# Patient Record
Sex: MALE | Race: WHITE | NOT HISPANIC OR LATINO
[De-identification: names, ages, dates, MRNs, and addresses within clinical notes are randomized per-mention and may not be internally consistent; named-entity substitution may affect disease eponyms.]

---

## 2018-05-16 ENCOUNTER — APPOINTMENT (OUTPATIENT)
Dept: SURGICAL ONCOLOGY | Facility: CLINIC | Age: 68
End: 2018-05-16
Payer: MEDICARE

## 2018-05-16 VITALS
BODY MASS INDEX: 30.09 KG/M2 | OXYGEN SATURATION: 98 % | WEIGHT: 242 LBS | HEIGHT: 75 IN | HEART RATE: 78 BPM | DIASTOLIC BLOOD PRESSURE: 136 MMHG | SYSTOLIC BLOOD PRESSURE: 181 MMHG

## 2018-05-16 DIAGNOSIS — Z78.9 OTHER SPECIFIED HEALTH STATUS: ICD-10-CM

## 2018-05-16 DIAGNOSIS — K57.32 DIVERTICULITIS OF LARGE INTESTINE W/OUT PERFORATION OR ABSCESS W/OUT BLEEDING: ICD-10-CM

## 2018-05-16 DIAGNOSIS — C44.90 UNSPECIFIED MALIGNANT NEOPLASM OF SKIN, UNSPECIFIED: ICD-10-CM

## 2018-05-16 DIAGNOSIS — Z84.0 FAMILY HISTORY OF DISEASES OF THE SKIN AND SUBCUTANEOUS TISSUE: ICD-10-CM

## 2018-05-16 DIAGNOSIS — Z82.49 FAMILY HISTORY OF ISCHEMIC HEART DISEASE AND OTHER DISEASES OF THE CIRCULATORY SYSTEM: ICD-10-CM

## 2018-05-16 DIAGNOSIS — N40.0 BENIGN PROSTATIC HYPERPLASIA WITHOUT LOWER URINARY TRACT SYMPMS: ICD-10-CM

## 2018-05-16 DIAGNOSIS — M25.50 PAIN IN UNSPECIFIED JOINT: ICD-10-CM

## 2018-05-16 PROCEDURE — 99203 OFFICE O/P NEW LOW 30 MIN: CPT

## 2018-06-01 ENCOUNTER — APPOINTMENT (OUTPATIENT)
Dept: PLASTIC SURGERY | Facility: CLINIC | Age: 68
End: 2018-06-01
Payer: MEDICARE

## 2018-06-01 ENCOUNTER — RESULT REVIEW (OUTPATIENT)
Age: 68
End: 2018-06-01

## 2018-06-01 VITALS
HEIGHT: 75 IN | WEIGHT: 235 LBS | BODY MASS INDEX: 29.22 KG/M2 | DIASTOLIC BLOOD PRESSURE: 99 MMHG | HEART RATE: 89 BPM | SYSTOLIC BLOOD PRESSURE: 147 MMHG

## 2018-06-01 DIAGNOSIS — I10 ESSENTIAL (PRIMARY) HYPERTENSION: ICD-10-CM

## 2018-06-01 PROCEDURE — 99204 OFFICE O/P NEW MOD 45 MIN: CPT

## 2018-06-04 PROBLEM — I10 HIGH BLOOD PRESSURE: Status: ACTIVE | Noted: 2018-06-01

## 2018-07-25 ENCOUNTER — FORM ENCOUNTER (OUTPATIENT)
Age: 68
End: 2018-07-25

## 2018-07-26 ENCOUNTER — OUTPATIENT (OUTPATIENT)
Dept: OUTPATIENT SERVICES | Facility: HOSPITAL | Age: 68
LOS: 1 days | End: 2018-07-26
Payer: MEDICARE

## 2018-07-26 VITALS
OXYGEN SATURATION: 97 % | DIASTOLIC BLOOD PRESSURE: 84 MMHG | TEMPERATURE: 97 F | HEIGHT: 75 IN | WEIGHT: 244.93 LBS | SYSTOLIC BLOOD PRESSURE: 119 MMHG | HEART RATE: 77 BPM | RESPIRATION RATE: 16 BRPM

## 2018-07-26 DIAGNOSIS — D03.20: ICD-10-CM

## 2018-07-26 DIAGNOSIS — D03.9 MELANOMA IN SITU, UNSPECIFIED: ICD-10-CM

## 2018-07-26 DIAGNOSIS — Z01.818 ENCOUNTER FOR OTHER PREPROCEDURAL EXAMINATION: ICD-10-CM

## 2018-07-26 DIAGNOSIS — I10 ESSENTIAL (PRIMARY) HYPERTENSION: ICD-10-CM

## 2018-07-26 LAB
ANION GAP SERPL CALC-SCNC: 11 MMOL/L — SIGNIFICANT CHANGE UP (ref 5–17)
BUN SERPL-MCNC: 18 MG/DL — SIGNIFICANT CHANGE UP (ref 7–23)
CALCIUM SERPL-MCNC: 9.8 MG/DL — SIGNIFICANT CHANGE UP (ref 8.4–10.5)
CHLORIDE SERPL-SCNC: 103 MMOL/L — SIGNIFICANT CHANGE UP (ref 96–108)
CO2 SERPL-SCNC: 26 MMOL/L — SIGNIFICANT CHANGE UP (ref 22–31)
CREAT SERPL-MCNC: 1.29 MG/DL — SIGNIFICANT CHANGE UP (ref 0.5–1.3)
GLUCOSE SERPL-MCNC: 109 MG/DL — HIGH (ref 70–99)
HCT VFR BLD CALC: 50.2 % — HIGH (ref 39–50)
HGB BLD-MCNC: 17 G/DL — SIGNIFICANT CHANGE UP (ref 13–17)
LDH SERPL L TO P-CCNC: 196 U/L — SIGNIFICANT CHANGE UP (ref 50–242)
MCHC RBC-ENTMCNC: 31.4 PG — SIGNIFICANT CHANGE UP (ref 27–34)
MCHC RBC-ENTMCNC: 33.9 GM/DL — SIGNIFICANT CHANGE UP (ref 32–36)
MCV RBC AUTO: 92.6 FL — SIGNIFICANT CHANGE UP (ref 80–100)
PLATELET # BLD AUTO: 195 K/UL — SIGNIFICANT CHANGE UP (ref 150–400)
POTASSIUM SERPL-MCNC: 4.8 MMOL/L — SIGNIFICANT CHANGE UP (ref 3.5–5.3)
POTASSIUM SERPL-SCNC: 4.8 MMOL/L — SIGNIFICANT CHANGE UP (ref 3.5–5.3)
RBC # BLD: 5.42 M/UL — SIGNIFICANT CHANGE UP (ref 4.2–5.8)
RBC # FLD: 13.8 % — SIGNIFICANT CHANGE UP (ref 10.3–14.5)
SODIUM SERPL-SCNC: 140 MMOL/L — SIGNIFICANT CHANGE UP (ref 135–145)
WBC # BLD: 5.54 K/UL — SIGNIFICANT CHANGE UP (ref 3.8–10.5)
WBC # FLD AUTO: 5.54 K/UL — SIGNIFICANT CHANGE UP (ref 3.8–10.5)

## 2018-07-26 PROCEDURE — 80048 BASIC METABOLIC PNL TOTAL CA: CPT

## 2018-07-26 PROCEDURE — G0463: CPT

## 2018-07-26 PROCEDURE — 71046 X-RAY EXAM CHEST 2 VIEWS: CPT

## 2018-07-26 PROCEDURE — 83615 LACTATE (LD) (LDH) ENZYME: CPT

## 2018-07-26 PROCEDURE — 85027 COMPLETE CBC AUTOMATED: CPT

## 2018-07-26 PROCEDURE — 71046 X-RAY EXAM CHEST 2 VIEWS: CPT | Mod: 26

## 2018-07-26 RX ORDER — SODIUM CHLORIDE 9 MG/ML
3 INJECTION INTRAMUSCULAR; INTRAVENOUS; SUBCUTANEOUS EVERY 8 HOURS
Qty: 0 | Refills: 0 | Status: DISCONTINUED | OUTPATIENT
Start: 2018-08-02 | End: 2018-08-17

## 2018-07-26 RX ORDER — LIDOCAINE HCL 20 MG/ML
0.2 VIAL (ML) INJECTION ONCE
Qty: 0 | Refills: 0 | Status: DISCONTINUED | OUTPATIENT
Start: 2018-08-02 | End: 2018-08-17

## 2018-07-26 NOTE — H&P PST ADULT - ATTENDING COMMENTS
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 Valentino.    Diagnosis and planned treatment were reviewed with him in my office and again the morning of operation.    All questions answered.    Consent on chart

## 2018-07-26 NOTE — H&P PST ADULT - NSANTHOSAYNRD_GEN_A_CORE
No. RAJANI screening performed.  STOP BANG Legend: 0-2 = LOW Risk; 3-4 = INTERMEDIATE Risk; 5-8 = HIGH Risk

## 2018-07-26 NOTE — H&P PST ADULT - HISTORY OF PRESENT ILLNESS
68 y/o male with hx of HTN, DJD , came in for PSt today for  wide excision meleft ear lobe. Patient went to dermatologist had biopsy on affected  site which was positive ,  referred to Dr Gallardo , evaluated and indicated surgery for treatment.

## 2018-07-26 NOTE — H&P PST ADULT - PROBLEM SELECTOR PLAN 1
Wide excision melanoma left earlobe, reconstruction of left earlobe wound, possible local flap ,possible skin graft   PSt instruction given , CBC/CMP/ CXR done pending result   To continue asa until DOS   has appointment with PMD in [place] for preop medical evaluation Wide excision melanoma left earlobe, reconstruction of left earlobe wound, possible local flap ,possible skin graft   PSt instruction given , CBC/bMP/ldh/ CXR done pending result   To continue asa until DOS   has appointment with PMD in [place] for preop medical evaluation

## 2018-07-26 NOTE — H&P PST ADULT - PSH
S/P foot surgery  right 2007  Ulnar nerve impingement  left 1996  S/P knee surgery  arthroscopy bilat knees - total 3 times - 2000, 2002, 2010  S/P carpal tunnel release  left 1996  S/P laminectomy  1995

## 2018-08-01 ENCOUNTER — TRANSCRIPTION ENCOUNTER (OUTPATIENT)
Age: 68
End: 2018-08-01

## 2018-08-01 RX ORDER — OXYCODONE HYDROCHLORIDE 5 MG/1
5 TABLET ORAL ONCE
Qty: 0 | Refills: 0 | Status: DISCONTINUED | OUTPATIENT
Start: 2018-08-02 | End: 2018-08-02

## 2018-08-01 RX ORDER — SODIUM CHLORIDE 9 MG/ML
1000 INJECTION, SOLUTION INTRAVENOUS
Qty: 0 | Refills: 0 | Status: DISCONTINUED | OUTPATIENT
Start: 2018-08-02 | End: 2018-08-17

## 2018-08-01 RX ORDER — ONDANSETRON 8 MG/1
4 TABLET, FILM COATED ORAL ONCE
Qty: 0 | Refills: 0 | Status: DISCONTINUED | OUTPATIENT
Start: 2018-08-02 | End: 2018-08-17

## 2018-08-01 NOTE — ASU DISCHARGE PLAN (ADULT/PEDIATRIC). - ACTIVITY LEVEL
no sports/gym/no heavy lifting/no exercise/Sleep with 1-2 pillows to elevate the head slightly to relieve swelling

## 2018-08-01 NOTE — ASU DISCHARGE PLAN (ADULT/PEDIATRIC). - ITEMS TO FOLLOWUP WITH YOUR PHYSICIAN'S
Initial followup in the next 7-10 days his of plastic surgery.  Dr. Gallardo should call with pathology report by August 14, followup with him will be based upon the conversation Please follow up with Dr. Valentino NEXT WEEK Friday 08/10/18.   Call [phone] to make an appointment.    Dr. Gallardo should call with pathology report by August 14, followup with him will be based upon the conversation

## 2018-08-01 NOTE — ASU DISCHARGE PLAN (ADULT/PEDIATRIC). - SPECIAL INSTRUCTIONS
Please keep your earlobe dry until next week you see Dr. Valentino.  Keep all the dressings intact; if the dressings happen to FALL off that is okay - since it is the earlobe it will be difficult for dressings to stick.  Sleep with a few extra pillows to keep the head slightly elevated this will help with any swelling that may occur. Please keep your earlobe dry until next week you see Dr. Valentino.  Keep all the dressings intact do not touch or get the left earlobe dressing wet. sleep on the right side to prevent damage to the left earlobe.  Sleep with a few extra pillows to keep the head slightly elevated this will help with any swelling that may occur.

## 2018-08-01 NOTE — ASU DISCHARGE PLAN (ADULT/PEDIATRIC). - NOTIFY
Swelling that continues/Persistent Nausea and Vomiting/Fever greater than 101/Excessive Diarrhea/Increased Irritability or Sluggishness/Pain not relieved by Medications/Inability to Tolerate Liquids or Foods/Numbness, color, or temperature change to extremity/Unable to Urinate/Numbness, tingling/Bleeding that does not stop

## 2018-08-01 NOTE — ASU DISCHARGE PLAN (ADULT/PEDIATRIC). - MEDICATION SUMMARY - MEDICATIONS TO TAKE
I will START or STAY ON the medications listed below when I get home from the hospital:    Percocet 5/325 oral tablet  -- 1 tab(s) by mouth every 4 to 6 hours, As Needed -for severe pain MDD:6 tabs   -- Caution federal law prohibits the transfer of this drug to any person other  than the person for whom it was prescribed.  May cause drowsiness.  Alcohol may intensify this effect.  Use care when operating dangerous machinery.  This prescription cannot be refilled.  This product contains acetaminophen.  Do not use  with any other product containing acetaminophen to prevent possible liver damage.  Using more of this medication than prescribed may cause serious breathing problems.    -- Indication: For for severe pain; this may make you constipated please take an over the counter stool softener if constipation occurs    Ecotrin Adult Low Strength 81 mg oral delayed release tablet  -- 1 tab(s) by mouth once a day  -- Indication: For home medication    atenolol 100 mg oral tablet  -- 1 tab(s) by mouth once a day  -- Indication: For home medication

## 2018-08-02 ENCOUNTER — RESULT REVIEW (OUTPATIENT)
Age: 68
End: 2018-08-02

## 2018-08-02 ENCOUNTER — APPOINTMENT (OUTPATIENT)
Dept: SURGICAL ONCOLOGY | Facility: HOSPITAL | Age: 68
End: 2018-08-02

## 2018-08-02 ENCOUNTER — OUTPATIENT (OUTPATIENT)
Dept: OUTPATIENT SERVICES | Facility: HOSPITAL | Age: 68
LOS: 1 days | End: 2018-08-02
Payer: MEDICARE

## 2018-08-02 VITALS
TEMPERATURE: 97 F | SYSTOLIC BLOOD PRESSURE: 129 MMHG | DIASTOLIC BLOOD PRESSURE: 89 MMHG | WEIGHT: 244.93 LBS | RESPIRATION RATE: 16 BRPM | HEIGHT: 75 IN | HEART RATE: 79 BPM | OXYGEN SATURATION: 97 %

## 2018-08-02 VITALS
SYSTOLIC BLOOD PRESSURE: 132 MMHG | TEMPERATURE: 97 F | OXYGEN SATURATION: 97 % | DIASTOLIC BLOOD PRESSURE: 82 MMHG | RESPIRATION RATE: 18 BRPM | HEART RATE: 82 BPM

## 2018-08-02 DIAGNOSIS — D03.20: ICD-10-CM

## 2018-08-02 PROCEDURE — 88305 TISSUE EXAM BY PATHOLOGIST: CPT | Mod: 26

## 2018-08-02 PROCEDURE — 11644 EXC F/E/E/N/L MAL+MRG 3.1-4: CPT | Mod: LT

## 2018-08-02 PROCEDURE — 88341 IMHCHEM/IMCYTCHM EA ADD ANTB: CPT | Mod: 26

## 2018-08-02 PROCEDURE — 15260 FTH/GFT FR N/E/E/L 20 SQCM/<: CPT

## 2018-08-02 PROCEDURE — 88342 IMHCHEM/IMCYTCHM 1ST ANTB: CPT

## 2018-08-02 PROCEDURE — 88342 IMHCHEM/IMCYTCHM 1ST ANTB: CPT | Mod: 26

## 2018-08-02 PROCEDURE — 88305 TISSUE EXAM BY PATHOLOGIST: CPT

## 2018-08-02 PROCEDURE — 88341 IMHCHEM/IMCYTCHM EA ADD ANTB: CPT

## 2018-08-02 PROCEDURE — 11642 EXC F/E/E/N/L MAL+MRG 1.1-2: CPT

## 2018-08-02 NOTE — BRIEF OPERATIVE NOTE - PROCEDURE
<<-----Click on this checkbox to enter Procedure Reconstruction of left earlobe  08/02/2018  FTSG from left neck to the left earlobe  Active  BKWON

## 2018-08-02 NOTE — BRIEF OPERATIVE NOTE - PROCEDURE
<<-----Click on this checkbox to enter Procedure Melanoma excision  08/02/2018  Excision of large diameter melanoma in situ from our left earlobe  Active  MBEG

## 2018-08-04 PROBLEM — M19.90 UNSPECIFIED OSTEOARTHRITIS, UNSPECIFIED SITE: Chronic | Status: ACTIVE | Noted: 2018-07-26

## 2018-08-08 LAB — SURGICAL PATHOLOGY STUDY: SIGNIFICANT CHANGE UP

## 2018-08-09 ENCOUNTER — APPOINTMENT (OUTPATIENT)
Dept: PLASTIC SURGERY | Facility: CLINIC | Age: 68
End: 2018-08-09
Payer: MEDICARE

## 2018-08-09 PROCEDURE — 99024 POSTOP FOLLOW-UP VISIT: CPT

## 2018-08-15 ENCOUNTER — APPOINTMENT (OUTPATIENT)
Dept: PLASTIC SURGERY | Facility: CLINIC | Age: 68
End: 2018-08-15
Payer: MEDICARE

## 2018-08-15 PROCEDURE — 99024 POSTOP FOLLOW-UP VISIT: CPT

## 2018-08-16 ENCOUNTER — TRANSCRIPTION ENCOUNTER (OUTPATIENT)
Age: 68
End: 2018-08-16

## 2018-09-07 ENCOUNTER — APPOINTMENT (OUTPATIENT)
Dept: PLASTIC SURGERY | Facility: CLINIC | Age: 68
End: 2018-09-07
Payer: MEDICARE

## 2018-09-07 PROCEDURE — 99024 POSTOP FOLLOW-UP VISIT: CPT

## 2018-10-23 ENCOUNTER — APPOINTMENT (OUTPATIENT)
Dept: SURGICAL ONCOLOGY | Facility: HOSPITAL | Age: 68
End: 2018-10-23

## 2019-03-05 ENCOUNTER — OUTPATIENT (OUTPATIENT)
Dept: OUTPATIENT SERVICES | Facility: HOSPITAL | Age: 69
LOS: 1 days | End: 2019-03-05
Payer: MEDICARE

## 2019-03-05 VITALS
WEIGHT: 235.01 LBS | RESPIRATION RATE: 16 BRPM | OXYGEN SATURATION: 96 % | HEIGHT: 75 IN | TEMPERATURE: 98 F | DIASTOLIC BLOOD PRESSURE: 88 MMHG | HEART RATE: 77 BPM | SYSTOLIC BLOOD PRESSURE: 152 MMHG

## 2019-03-05 DIAGNOSIS — Z85.828 PERSONAL HISTORY OF OTHER MALIGNANT NEOPLASM OF SKIN: Chronic | ICD-10-CM

## 2019-03-05 DIAGNOSIS — D22.9 MELANOCYTIC NEVI, UNSPECIFIED: ICD-10-CM

## 2019-03-05 DIAGNOSIS — Z01.84 ENCOUNTER FOR ANTIBODY RESPONSE EXAMINATION: ICD-10-CM

## 2019-03-05 LAB
ANION GAP SERPL CALC-SCNC: 13 MMOL/L — SIGNIFICANT CHANGE UP (ref 5–17)
BUN SERPL-MCNC: 17 MG/DL — SIGNIFICANT CHANGE UP (ref 7–23)
CALCIUM SERPL-MCNC: 9.7 MG/DL — SIGNIFICANT CHANGE UP (ref 8.4–10.5)
CHLORIDE SERPL-SCNC: 102 MMOL/L — SIGNIFICANT CHANGE UP (ref 96–108)
CO2 SERPL-SCNC: 22 MMOL/L — SIGNIFICANT CHANGE UP (ref 22–31)
CREAT SERPL-MCNC: 1.11 MG/DL — SIGNIFICANT CHANGE UP (ref 0.5–1.3)
GLUCOSE SERPL-MCNC: 87 MG/DL — SIGNIFICANT CHANGE UP (ref 70–99)
POTASSIUM SERPL-MCNC: 4.4 MMOL/L — SIGNIFICANT CHANGE UP (ref 3.5–5.3)
POTASSIUM SERPL-SCNC: 4.4 MMOL/L — SIGNIFICANT CHANGE UP (ref 3.5–5.3)
SODIUM SERPL-SCNC: 137 MMOL/L — SIGNIFICANT CHANGE UP (ref 135–145)

## 2019-03-05 PROCEDURE — G0463: CPT

## 2019-03-05 PROCEDURE — 80048 BASIC METABOLIC PNL TOTAL CA: CPT

## 2019-03-05 RX ORDER — SODIUM CHLORIDE 9 MG/ML
3 INJECTION INTRAMUSCULAR; INTRAVENOUS; SUBCUTANEOUS EVERY 8 HOURS
Qty: 0 | Refills: 0 | Status: DISCONTINUED | OUTPATIENT
Start: 2019-03-14 | End: 2019-03-29

## 2019-03-05 RX ORDER — LIDOCAINE HCL 20 MG/ML
0.2 VIAL (ML) INJECTION ONCE
Qty: 0 | Refills: 0 | Status: DISCONTINUED | OUTPATIENT
Start: 2019-03-14 | End: 2019-03-29

## 2019-03-05 NOTE — H&P PST ADULT - HISTORY OF PRESENT ILLNESS
66 y/o male with hx of HTN, DJD , came in for PSt today for  wide excision meleft ear lobe. Patient went to dermatologist had biopsy on affected  site which was positive ,  referred to Dr Gallardo , evaluated and indicated surgery for treatment. 69 y/o male with PMH of HTN with left ear lobe melanocytic hyperplasia s/p wide excision 7/2018 planned for wide excision atypical melanocytic hyperplasia of left ear lobe 3/14/19. 67 y/o male with PMH of HTN with left ear lobe melanocytic hyperplasia s/p wide excision 7/2018 planned for wide excision atypical melanocytic hyperplasia of left ear lobe 3/14/19.       **** Patient with "SR" added to his name, no "SR" on sunrise, spoke to registration.

## 2019-03-05 NOTE — H&P PST ADULT - PSH
S/P carpal tunnel release  left 1996  S/P foot surgery  right 2007  S/P knee surgery  arthroscopy bilat knees - total 3 times - 2000, 2002, 2010  S/P laminectomy  1995  Ulnar nerve impingement  left 1996 History of skin cancer in adulthood  left ear lobe wide excision 7/2018  S/P carpal tunnel release  left 1996  S/P foot surgery  right 2007  S/P knee surgery  arthroscopy bilat knees - total 3 times - 2000, 2002, 2010  S/P laminectomy  1995  Ulnar nerve impingement  left 1996

## 2019-03-05 NOTE — H&P PST ADULT - GASTROINTESTINAL DETAILS
normal/bowel sounds normal/no guarding/soft/no rebound tenderness/nontender nontender/no distention/no rebound tenderness/soft/bowel sounds normal/no guarding

## 2019-03-05 NOTE — H&P PST ADULT - PMH
Arthritis  hx of multiple knee surgery  Hypertension Arthritis  hx of multiple knee surgery  Hypertension    Melanocytic nevi of left ear

## 2019-03-05 NOTE — H&P PST ADULT - ACTIVITY
able to do strenous activities and heavy lifting exercise roopa able to do strenuous activities and heavy lifting, exercise daily

## 2019-03-05 NOTE — H&P PST ADULT - PROBLEM SELECTOR PLAN 2
To continue taking atenolol regularly as prescribed and on the DOS atenolol on the DOS  continue prophylactic aspirin

## 2019-03-05 NOTE — H&P PST ADULT - PROBLEM SELECTOR PLAN 1
planned for wide excision atypical melanocytic hyperplasia of left ear lobe 3/14/19.   BMP send  preprocedure instructions discussed

## 2019-03-13 RX ORDER — IBUPROFEN 200 MG
1 TABLET ORAL
Qty: 21 | Refills: 0 | OUTPATIENT
Start: 2019-03-13 | End: 2019-03-19

## 2019-03-13 NOTE — ASU DISCHARGE PLAN (ADULT/PEDIATRIC) - ACTIVITY LEVEL
No sports/gym/No weight bearing/No excercise/No heavy lifting No sports/gym/No weight bearing/No excercise/No heavy lifting/sleep with head slightly elevated to reduce swelling

## 2019-03-13 NOTE — ASU DISCHARGE PLAN (ADULT/PEDIATRIC) - CALL YOUR DOCTOR IF YOU HAVE ANY OF THE FOLLOWING:
Pain not relieved by Medications/Swelling that gets worse/Wound/Surgical Site with redness, or foul smelling discharge or pus/Numbness, tingling, color or temperature change to extremity/Nausea and vomiting that does not stop/Bleeding that does not stop/Fever greater than (need to indicate Fahrenheit or Celsius)/Unable to urinate Pain not relieved by Medications/Nausea and vomiting that does not stop/Unable to urinate/Numbness, tingling, color or temperature change to extremity/Fever greater than (need to indicate Fahrenheit or Celsius)/Wound/Surgical Site with redness, or foul smelling discharge or pus/Swelling that gets worse/.[phone]/Bleeding that does not stop Unable to urinate/Nausea and vomiting that does not stop/Fever greater than (need to indicate Fahrenheit or Celsius)/Numbness, tingling, color or temperature change to extremity/Pain not relieved by Medications/Swelling that gets worse/.[phone]/Bleeding that does not stop

## 2019-03-13 NOTE — ASU DISCHARGE PLAN (ADULT/PEDIATRIC) - ASU DC SPECIAL INSTRUCTIONSFT
Please follow up with Dr. Valentino NEXT WEEK THURSDAY 03/21/19.  Call the office for an appointment time: [phone].  Our office location has changed. Our NEW Address is:  [address] [place] [address]. WE ARE ON THE 3RD FLOOR.    Dr. Gallardo will be calling with pathology results in 10-14 business days. Subsequent follow ups with be determined by result. Please follow up with Dr. Valentino NEXT WEEK THURSDAY 03/21/19.  Call the office for an appointment time: [phone].  Our office location has changed. Our NEW Address is:  [address] [place] [address]. WE ARE ON THE 3RD FLOOR.    You may remove the dressing on your ear and bathe in 48 hours.  After bathing, apply bacitracin ointment to the insicion.    Dr. Gallardo will be calling with pathology results in 10-14 business days. Subsequent follow ups with be determined by result.

## 2019-03-13 NOTE — BRIEF OPERATIVE NOTE - OPERATION/FINDINGS
LEFT EAR WOUND S/P EXCISION OF MELANOCYTIC HYPERPLASIA LEFT EAR WOUND S/P EXCISION OF MELANOCYTIC HYPERPLASIA    Closure of wound using local tissue rearrangement

## 2019-03-14 ENCOUNTER — RESULT REVIEW (OUTPATIENT)
Age: 69
End: 2019-03-14

## 2019-03-14 ENCOUNTER — OUTPATIENT (OUTPATIENT)
Dept: OUTPATIENT SERVICES | Facility: HOSPITAL | Age: 69
LOS: 1 days | End: 2019-03-14
Payer: MEDICARE

## 2019-03-14 ENCOUNTER — APPOINTMENT (OUTPATIENT)
Dept: SURGICAL ONCOLOGY | Facility: HOSPITAL | Age: 69
End: 2019-03-14

## 2019-03-14 ENCOUNTER — INPATIENT (INPATIENT)
Facility: HOSPITAL | Age: 69
LOS: 4 days | Discharge: HOME | DRG: 310 | End: 2019-03-19
Attending: INTERNAL MEDICINE | Admitting: INTERNAL MEDICINE
Payer: MEDICARE

## 2019-03-14 VITALS
HEART RATE: 83 BPM | HEIGHT: 75 IN | SYSTOLIC BLOOD PRESSURE: 173 MMHG | RESPIRATION RATE: 18 BRPM | OXYGEN SATURATION: 96 % | WEIGHT: 235.01 LBS | DIASTOLIC BLOOD PRESSURE: 78 MMHG

## 2019-03-14 VITALS
HEART RATE: 84 BPM | SYSTOLIC BLOOD PRESSURE: 143 MMHG | DIASTOLIC BLOOD PRESSURE: 77 MMHG | OXYGEN SATURATION: 99 % | RESPIRATION RATE: 16 BRPM

## 2019-03-14 VITALS
TEMPERATURE: 98 F | OXYGEN SATURATION: 98 % | HEART RATE: 86 BPM | WEIGHT: 235.01 LBS | SYSTOLIC BLOOD PRESSURE: 145 MMHG | DIASTOLIC BLOOD PRESSURE: 91 MMHG | HEIGHT: 75 IN | RESPIRATION RATE: 16 BRPM

## 2019-03-14 DIAGNOSIS — Z01.84 ENCOUNTER FOR ANTIBODY RESPONSE EXAMINATION: ICD-10-CM

## 2019-03-14 DIAGNOSIS — Z85.828 PERSONAL HISTORY OF OTHER MALIGNANT NEOPLASM OF SKIN: Chronic | ICD-10-CM

## 2019-03-14 DIAGNOSIS — D22.9 MELANOCYTIC NEVI, UNSPECIFIED: ICD-10-CM

## 2019-03-14 DIAGNOSIS — I48.91 UNSPECIFIED ATRIAL FIBRILLATION: ICD-10-CM

## 2019-03-14 LAB
ALBUMIN SERPL ELPH-MCNC: 3.9 G/DL — SIGNIFICANT CHANGE UP (ref 3.3–5)
ALP SERPL-CCNC: 66 U/L — SIGNIFICANT CHANGE UP (ref 40–120)
ALT FLD-CCNC: 14 U/L — SIGNIFICANT CHANGE UP (ref 10–45)
ANION GAP SERPL CALC-SCNC: 11 MMOL/L — SIGNIFICANT CHANGE UP (ref 5–17)
APTT BLD: 33.5 SEC — SIGNIFICANT CHANGE UP (ref 27.5–36.3)
AST SERPL-CCNC: 17 U/L — SIGNIFICANT CHANGE UP (ref 10–40)
BASOPHILS # BLD AUTO: 0 K/UL — SIGNIFICANT CHANGE UP (ref 0–0.2)
BASOPHILS NFR BLD AUTO: 0.6 % — SIGNIFICANT CHANGE UP (ref 0–2)
BILIRUB SERPL-MCNC: 0.4 MG/DL — SIGNIFICANT CHANGE UP (ref 0.2–1.2)
BUN SERPL-MCNC: 23 MG/DL — SIGNIFICANT CHANGE UP (ref 7–23)
CALCIUM SERPL-MCNC: 9.4 MG/DL — SIGNIFICANT CHANGE UP (ref 8.4–10.5)
CHLORIDE SERPL-SCNC: 104 MMOL/L — SIGNIFICANT CHANGE UP (ref 96–108)
CO2 SERPL-SCNC: 23 MMOL/L — SIGNIFICANT CHANGE UP (ref 22–31)
CREAT SERPL-MCNC: 1.21 MG/DL — SIGNIFICANT CHANGE UP (ref 0.5–1.3)
EOSINOPHIL # BLD AUTO: 0 K/UL — SIGNIFICANT CHANGE UP (ref 0–0.5)
EOSINOPHIL NFR BLD AUTO: 0.5 % — SIGNIFICANT CHANGE UP (ref 0–6)
GLUCOSE SERPL-MCNC: 118 MG/DL — HIGH (ref 70–99)
HCT VFR BLD CALC: 46.5 % — SIGNIFICANT CHANGE UP (ref 39–50)
HGB BLD-MCNC: 16 G/DL — SIGNIFICANT CHANGE UP (ref 13–17)
INR BLD: 1.06 RATIO — SIGNIFICANT CHANGE UP (ref 0.88–1.16)
LYMPHOCYTES # BLD AUTO: 0.8 K/UL — LOW (ref 1–3.3)
LYMPHOCYTES # BLD AUTO: 16.7 % — SIGNIFICANT CHANGE UP (ref 13–44)
MCHC RBC-ENTMCNC: 32 PG — SIGNIFICANT CHANGE UP (ref 27–34)
MCHC RBC-ENTMCNC: 34.4 GM/DL — SIGNIFICANT CHANGE UP (ref 32–36)
MCV RBC AUTO: 93.1 FL — SIGNIFICANT CHANGE UP (ref 80–100)
MONOCYTES # BLD AUTO: 0.1 K/UL — SIGNIFICANT CHANGE UP (ref 0–0.9)
MONOCYTES NFR BLD AUTO: 3.3 % — SIGNIFICANT CHANGE UP (ref 2–14)
NEUTROPHILS # BLD AUTO: 3.6 K/UL — SIGNIFICANT CHANGE UP (ref 1.8–7.4)
NEUTROPHILS NFR BLD AUTO: 79 % — HIGH (ref 43–77)
PLATELET # BLD AUTO: 149 K/UL — LOW (ref 150–400)
POTASSIUM SERPL-MCNC: 4.9 MMOL/L — SIGNIFICANT CHANGE UP (ref 3.5–5.3)
POTASSIUM SERPL-SCNC: 4.9 MMOL/L — SIGNIFICANT CHANGE UP (ref 3.5–5.3)
PROT SERPL-MCNC: 6.6 G/DL — SIGNIFICANT CHANGE UP (ref 6–8.3)
PROTHROM AB SERPL-ACNC: 12.2 SEC — SIGNIFICANT CHANGE UP (ref 10–12.9)
RBC # BLD: 4.99 M/UL — SIGNIFICANT CHANGE UP (ref 4.2–5.8)
RBC # FLD: 12.3 % — SIGNIFICANT CHANGE UP (ref 10.3–14.5)
SODIUM SERPL-SCNC: 138 MMOL/L — SIGNIFICANT CHANGE UP (ref 135–145)
TROPONIN T, HIGH SENSITIVITY RESULT: <6 NG/L — SIGNIFICANT CHANGE UP (ref 0–51)
WBC # BLD: 4.5 K/UL — SIGNIFICANT CHANGE UP (ref 3.8–10.5)
WBC # FLD AUTO: 4.5 K/UL — SIGNIFICANT CHANGE UP (ref 3.8–10.5)

## 2019-03-14 PROCEDURE — 14060 TIS TRNFR E/N/E/L 10 SQ CM/<: CPT

## 2019-03-14 PROCEDURE — 88305 TISSUE EXAM BY PATHOLOGIST: CPT | Mod: 26

## 2019-03-14 PROCEDURE — 93010 ELECTROCARDIOGRAM REPORT: CPT

## 2019-03-14 PROCEDURE — 11646 EXC F/E/E/N/L MAL+MRG >4 CM: CPT

## 2019-03-14 PROCEDURE — 71046 X-RAY EXAM CHEST 2 VIEWS: CPT | Mod: 26

## 2019-03-14 PROCEDURE — 99285 EMERGENCY DEPT VISIT HI MDM: CPT | Mod: GC,25

## 2019-03-14 RX ORDER — ENOXAPARIN SODIUM 100 MG/ML
100 INJECTION SUBCUTANEOUS
Qty: 0 | Refills: 0 | Status: DISCONTINUED | OUTPATIENT
Start: 2019-03-14 | End: 2019-03-15

## 2019-03-14 RX ORDER — SODIUM CHLORIDE 9 MG/ML
1000 INJECTION, SOLUTION INTRAVENOUS
Qty: 0 | Refills: 0 | Status: DISCONTINUED | OUTPATIENT
Start: 2019-03-14 | End: 2019-03-29

## 2019-03-14 RX ORDER — METOPROLOL TARTRATE 50 MG
25 TABLET ORAL
Qty: 0 | Refills: 0 | Status: DISCONTINUED | OUTPATIENT
Start: 2019-03-14 | End: 2019-03-14

## 2019-03-14 RX ORDER — OXYCODONE HYDROCHLORIDE 5 MG/1
5 TABLET ORAL ONCE
Qty: 0 | Refills: 0 | Status: DISCONTINUED | OUTPATIENT
Start: 2019-03-14 | End: 2019-03-14

## 2019-03-14 RX ORDER — CELECOXIB 200 MG/1
200 CAPSULE ORAL ONCE
Qty: 0 | Refills: 0 | Status: COMPLETED | OUTPATIENT
Start: 2019-03-14 | End: 2019-03-14

## 2019-03-14 RX ORDER — ONDANSETRON 8 MG/1
4 TABLET, FILM COATED ORAL ONCE
Qty: 0 | Refills: 0 | Status: DISCONTINUED | OUTPATIENT
Start: 2019-03-14 | End: 2019-03-29

## 2019-03-14 RX ORDER — METOPROLOL TARTRATE 50 MG
50 TABLET ORAL
Qty: 0 | Refills: 0 | Status: DISCONTINUED | OUTPATIENT
Start: 2019-03-14 | End: 2019-03-19

## 2019-03-14 RX ORDER — OXYCODONE AND ACETAMINOPHEN 5; 325 MG/1; MG/1
1 TABLET ORAL EVERY 4 HOURS
Qty: 0 | Refills: 0 | Status: DISCONTINUED | OUTPATIENT
Start: 2019-03-14 | End: 2019-03-19

## 2019-03-14 RX ORDER — ACETAMINOPHEN 500 MG
1000 TABLET ORAL ONCE
Qty: 0 | Refills: 0 | Status: COMPLETED | OUTPATIENT
Start: 2019-03-14 | End: 2019-03-14

## 2019-03-14 RX ORDER — ASPIRIN/CALCIUM CARB/MAGNESIUM 324 MG
81 TABLET ORAL DAILY
Qty: 0 | Refills: 0 | Status: DISCONTINUED | OUTPATIENT
Start: 2019-03-14 | End: 2019-03-19

## 2019-03-14 RX ADMIN — Medication 1000 MILLIGRAM(S): at 07:56

## 2019-03-14 RX ADMIN — Medication 25 MILLIGRAM(S): at 17:30

## 2019-03-14 RX ADMIN — Medication 50 MILLIGRAM(S): at 21:27

## 2019-03-14 RX ADMIN — CELECOXIB 200 MILLIGRAM(S): 200 CAPSULE ORAL at 07:56

## 2019-03-14 RX ADMIN — Medication 81 MILLIGRAM(S): at 17:30

## 2019-03-14 RX ADMIN — ENOXAPARIN SODIUM 100 MILLIGRAM(S): 100 INJECTION SUBCUTANEOUS at 21:27

## 2019-03-14 NOTE — H&P ADULT - NSICDXPASTMEDICALHX_GEN_ALL_CORE_FT
PAST MEDICAL HISTORY:  Arthritis hx of multiple knee surgery    Hypertension     Melanocytic nevi of left ear

## 2019-03-14 NOTE — H&P ADULT - ASSESSMENT
pt  with h/o  htn.  hld.  melanoma  of   ear lobe/  prior  excision     and  again now,  after    surgery,  found  to  have  afib    tele/    echo  card  eval   lopressor  bid/    lovenox  bid  if  ok with  surgeon  labs/  tsh  in  [place] pt  with h/o  htn.  hld.  melanoma  of   ear lobe/  prior  excision     and   today, had  excision of  melanoma, of  left  ear pinna    tele/    echo  card  eval   lopressor  bid/    lovenox  bid  ,   cleared  by  dr martinez  to t  a/c  labs/  tsh  in  am

## 2019-03-14 NOTE — ED PROVIDER NOTE - ATTENDING CONTRIBUTION TO CARE
Attending MD Ponce.  Agree with above.  Pt is a 69 yo male with only known past medical hx of HTN presents today with new onset afib noted after moh’s procedure.  Pt was anesthetized, surgery performed.  On emergency from anesthesia pt noted to have afib.  During presurg testing EKG was not reportedly performed and no known hx of afib.  Takes atenolol at baseline.  Pt is currently asymptomatic.  Pt has L ear wound c/w Moh’s procedure.  Pt has irregularly irregular cardiac sounds.  No old EKG’s in last 15 yrs in system.  No CP/SOB/light-headedness.  Pt is not anticoagulated. Attending MD Ponce.  Agree with above.  Pt is a 67 yo male with only known past medical hx of HTN presents today with new onset afib noted after moh’s procedure.  Pt was anesthetized, surgery performed.  On emergency from anesthesia pt noted to have afib.  During presurg testing EKG was not reportedly performed and no known hx of afib.  Takes atenolol at baseline.  Pt is currently asymptomatic.  Pt has L ear wound c/w Moh’s procedure.  Pt has irregularly irregular cardiac sounds.  No old EKG’s in last 15 yrs in system.  No CP/SOB/light-headedness.  Pt is not anticoagulated.  Pt educated re: dx and reason for planned admission.  Stable for admission to tele medicine for management of new onset afib without RVR.  Presumed new within several hours 2/2 report that outpt surg center did not note afib prior to procedure however no formal EKG performed thus exact time of onset unknown in asymptomatic patient.

## 2019-03-14 NOTE — CONSULT NOTE ADULT - SUBJECTIVE AND OBJECTIVE BOX
CHIEF COMPLAINT:Patient is a 68y old  Male who presents with a chief complaint of AF (14 Mar 2019 15:17)      HPI:  68y Male complaining of new onset Afib	  67yo M with hx of HTN on atenolol     presents with new onset Afib after melanoma removal surgery.     Was seen in same day OR for removal of melanoma earlier 3/14. Had melanoma removal in the past, here for recurrence. PST/previous EKGs are unknown and unable to be found. Patient does not recall any EKGs in the past . Pt found to be in Afib after the surgery during monitoring. Pt is asymptomatic and denies any palpitations or chest pain. (14 Mar 2019 15:17)      PAST MEDICAL & SURGICAL HISTORY:  Melanocytic nevi of left ear  Arthritis: hx of multiple knee surgery  Hypertension  History of skin cancer in adulthood: left ear lobe wide excision 7/2018  S/P foot surgery: right 2007  Ulnar nerve impingement: left 1996  S/P knee surgery: arthroscopy bilat knees - total 3 times - 2000, 2002, 2010  S/P carpal tunnel release: left 1996  S/P laminectomy: 1995      MEDICATIONS  (STANDING):  aspirin enteric coated 81 milliGRAM(s) Oral daily  metoprolol tartrate 25 milliGRAM(s) Oral two times a day    MEDICATIONS  (PRN):  oxyCODONE    5 mG/acetaminophen 325 mG 1 Tablet(s) Oral every 4 hours PRN Moderate Pain (4 - 6)      FAMILY HISTORY:      SOCIAL HISTORY:    [ ] Non-smoker  [ ] Smoker  [ ] Alcohol    Allergies    No Known Allergies    Intolerances    	    REVIEW OF SYSTEMS:  CONSTITUTIONAL: No fever, weight loss, or fatigue  EYES: No eye pain, visual disturbances, or discharge  ENT:  No difficulty hearing, tinnitus, vertigo; No sinus or throat pain  NECK: No pain or stiffness  RESPIRATORY: No cough, wheezing, chills or hemoptysis; + Shortness of Breath  CARDIOVASCULAR: No chest pain, palpitations, passing out, dizziness, or leg swelling  GASTROINTESTINAL: No abdominal or epigastric pain. No nausea, vomiting, or hematemesis; No diarrhea or constipation. No melena or hematochezia.  GENITOURINARY: No dysuria, frequency, hematuria, or incontinence  NEUROLOGICAL: No headaches, memory loss, loss of strength, numbness, or tremors  SKIN: No itching, burning, rashes, or lesions   LYMPH Nodes: No enlarged glands  ENDOCRINE: No heat or cold intolerance; No hair loss  MUSCULOSKELETAL: No joint pain or swelling; No muscle, back, or extremity pain  PSYCHIATRIC: No depression, anxiety, mood swings, or difficulty sleeping  HEME/LYMPH: No easy bruising, or bleeding gums  ALLERGY AND IMMUNOLOGIC: No hives or eczema	    [ ] All others negative	  [ ] Unable to obtain    PHYSICAL EXAM:  T(C): 36.3 (03-14-19 @ 17:03), Max: 36.5 (03-14-19 @ 11:54)  HR: 76 (03-14-19 @ 17:03) (76 - 86)  BP: 175/114 (03-14-19 @ 17:03) (115/77 - 175/114)  RR: 17 (03-14-19 @ 17:03) (16 - 18)  SpO2: 98% (03-14-19 @ 17:03) (96% - 99%)  Wt(kg): --  I&O's Summary      Appearance: Normal	  HEENT:   Normal oral mucosa, PERRL, EOMI	  Lymphatic: No lymphadenopathy  Cardiovascular: Normal S1 S2, No JVD, + murmurs, No edema  Respiratory: Lungs clear to auscultation	  Psychiatry: A & O x 3, Mood & affect appropriate  Gastrointestinal:  Soft, Non-tender, + BS	  Skin: No rashes, No ecchymoses, No cyanosis	  Neurologic: Non-focal  Extremities: Normal range of motion, No clubbing, cyanosis or edema  Vascular: Peripheral pulses palpable 2+ bilaterally    TELEMETRY: 	    ECG:  	  RADIOLOGY:  OTHER: 	  	  LABS:	 	    CARDIAC MARKERS:                              16.0   4.5   )-----------( 149      ( 14 Mar 2019 13:12 )             46.5     03-14    138  |  104  |  23  ----------------------------<  118<H>  4.9   |  23  |  1.21    Ca    9.4      14 Mar 2019 13:12    TPro  6.6  /  Alb  3.9  /  TBili  0.4  /  DBili  x   /  AST  17  /  ALT  14  /  AlkPhos  66  03-14    proBNP:   Lipid Profile:   HgA1c:   TSH:   PT/INR - ( 14 Mar 2019 13:12 )   PT: 12.2 sec;   INR: 1.06 ratio         PTT - ( 14 Mar 2019 13:12 )  PTT:33.5 sec    PREVIOUS DIAGNOSTIC TESTING:    < from: Xray Chest 2 Views PA/Lat (03.14.19 @ 14:02) >  INTERPRETATION:  A single chest x-ray was on March 14, 2019.    Indication: New  A FIB    Impression:    The heart is slightly enlarged. The lungs are clear. No acute pathology   seen in the visualized bones.

## 2019-03-14 NOTE — BRIEF OPERATIVE NOTE - NSICDXBRIEFPOSTOP_GEN_ALL_CORE_FT
POST-OP DIAGNOSIS:  Skin melanoma 14-Mar-2019 06:42:22  Angel Gallardo
POST-OP DIAGNOSIS:  Skin melanoma 14-Mar-2019 06:42:22  Angel Gallardo

## 2019-03-14 NOTE — ASU PATIENT PROFILE, ADULT - PSH
History of skin cancer in adulthood  left ear lobe wide excision 7/2018  S/P carpal tunnel release  left 1996  S/P foot surgery  right 2007  S/P knee surgery  arthroscopy bilat knees - total 3 times - 2000, 2002, 2010  S/P laminectomy  1995  Ulnar nerve impingement  left 1996

## 2019-03-14 NOTE — H&P ADULT - NSHPPHYSICALEXAM_GEN_ALL_CORE
PHYSICAL EXAMINATION:  Vital Signs Last 24 Hrs  T(C): 36.5 (14 Mar 2019 11:54), Max: 36.5 (14 Mar 2019 11:54)  T(F): 97.7 (14 Mar 2019 11:54), Max: 97.7 (14 Mar 2019 11:54)  HR: 82 (14 Mar 2019 13:07) (79 - 86)  BP: 162/106 (14 Mar 2019 13:07) (115/77 - 173/78)  BP(mean): --  RR: 16 (14 Mar 2019 13:07) (16 - 18)  SpO2: 97% (14 Mar 2019 13:07) (96% - 99%)  CAPILLARY BLOOD GLUCOSE            GENERAL: NAD, well-groomed,  HEAD:  atraumatic, normocephalic  EYES: sclera anicteric  ENMT: mucous membranes moist  NECK: supple, No JVD  CHEST/LUNG: clear to auscultation bilaterally;    no      rales   ,   no rhonchi,   HEART:  irregS1, S2  ABDOMEN: BS+, soft, ND, NT   EXTREMITIES:    no    edema    b/l LEs  NEURO: awake, ,     moves all extremities  SKIN: no     rash PHYSICAL EXAMINATION:  Vital Signs Last 24 Hrs  T(C): 36.5 (14 Mar 2019 11:54), Max: 36.5 (14 Mar 2019 11:54)  T(F): 97.7 (14 Mar 2019 11:54), Max: 97.7 (14 Mar 2019 11:54)  HR: 82 (14 Mar 2019 13:07) (79 - 86)  BP: 162/106 (14 Mar 2019 13:07) (115/77 - 173/78)  BP(mean): --  RR: 16 (14 Mar 2019 13:07) (16 - 18)  SpO2: 97% (14 Mar 2019 13:07) (96% - 99%)  CAPILLARY BLOOD GLUCOSE            GENERAL: NAD, well-groomed,/  dressing legt ear pinna  HEAD:  atraumatic, normocephalic  EYES: sclera anicteric  ENMT: mucous membranes moist  NECK: supple, No JVD  CHEST/LUNG: clear to auscultation bilaterally;    no      rales   ,   no rhonchi,   HEART:  irregS1, S2  ABDOMEN: BS+, soft, ND, NT   EXTREMITIES:    no    edema    b/l LEs  NEURO: awake, ,     moves all extremities  SKIN: no     rash

## 2019-03-14 NOTE — H&P ADULT - HISTORY OF PRESENT ILLNESS
68y Male complaining of new onset Afib	       67yo M with hx of HTN on atenolol     presents with new onset Afib after melanoma removal surgery.     Was seen in same day OR for removal of melanoma earlier 3/14. Had melanoma removal in the past, here for recurrence. PST/previous EKGs are unknown and unable to be found. Patient does not recall any EKGs in the past . Pt found to be in Afib after the surgery during monitoring. Pt is asymptomatic and denies any palpitations or chest pain.

## 2019-03-14 NOTE — ED ADULT NURSE NOTE - NSIMPLEMENTINTERV_GEN_ALL_ED
Implemented All Fall with Harm Risk Interventions:  [place] to call system. Call bell, personal items and telephone within reach. Instruct patient to call for assistance. Room bathroom lighting operational. Non-slip footwear when patient is off stretcher. Physically safe environment: no spills, clutter or unnecessary equipment. Stretcher in lowest position, wheels locked, appropriate side rails in place. Provide visual cue, wrist band, yellow gown, etc. Monitor gait and stability. Monitor for mental status changes and reorient to person, place, and time. Review medications for side effects contributing to fall risk. Reinforce activity limits and safety measures with patient and family. Provide visual clues: red socks.

## 2019-03-14 NOTE — H&P ADULT - NSHPLABSRESULTS_GEN_ALL_CORE
LABS:                        16.0   4.5   )-----------( 149      ( 14 Mar 2019 13:12 )             46.5     03-14    138  |  104  |  23  ----------------------------<  118<H>  4.9   |  23  |  1.21    Ca    9.4      14 Mar 2019 13:12    TPro  6.6  /  Alb  3.9  /  TBili  0.4  /  DBili  x   /  AST  17  /  ALT  14  /  AlkPhos  66  03-14    PT/INR - ( 14 Mar 2019 13:12 )   PT: 12.2 sec;   INR: 1.06 ratio         PTT - ( 14 Mar 2019 13:12 )  PTT:33.5 sec

## 2019-03-14 NOTE — BRIEF OPERATIVE NOTE - NSICDXBRIEFPREOP_GEN_ALL_CORE_FT
PRE-OP DIAGNOSIS:  Melanoma 14-Mar-2019 06:42:06  Angel Gallardo
PRE-OP DIAGNOSIS:  Melanoma 14-Mar-2019 06:42:06  Angel Gallardo

## 2019-03-14 NOTE — H&P ADULT - NSHPREVIEWOFSYSTEMS_GEN_ALL_CORE
REVIEW OF SYSTEMS:  GEN: no fever,    no chills  RESP: no SOB,   no cough  CVS: no chest pain,   palpitations  GI: no abdominal pain,   no nausea,   no vomiting,   no constipation,   no diarrhea  : no dysuria,   no frequency  NEURO: no headache,   no dizziness  PSYCH: no depression,   not anxious  Derm : no rash

## 2019-03-14 NOTE — ED PROVIDER NOTE - PHYSICAL EXAMINATION
Gen: Well appearing, NAD  Head: NCAT  HEENT: PERRL, MMM, normal conjunctiva, anicteric, neck supple  Lung: CTAB, no adventitious sounds  CV: irreg irreg, no murmurs, rubs or gallops  Abd: soft, NTND, no rebound or guarding, no CVAT  MSK: No edema, no visible deformities  Neuro: No focal neurologic deficits. CN II-XII grossly intact. 5/5 strength and normal sensation in all extremities.  Skin: Warm and dry, no evidence of rash  Psych: normal mood and affect

## 2019-03-14 NOTE — H&P ADULT - NSICDXPASTSURGICALHX_GEN_ALL_CORE_FT
PAST SURGICAL HISTORY:  History of skin cancer in adulthood left ear lobe wide excision 7/2018    S/P carpal tunnel release left 1996    S/P foot surgery right 2007    S/P knee surgery arthroscopy bilat knees - total 3 times - 2000, 2002, 2010    S/P laminectomy 1995    Ulnar nerve impingement left 1996

## 2019-03-14 NOTE — ED ADULT NURSE NOTE - CHPI ED NUR SYMPTOMS NEG
no congestion/no vomiting/no chest pain/no fever/no chills/no nausea/no diaphoresis/no dizziness/no back pain/no syncope/no shortness of breath

## 2019-03-14 NOTE — ED ADULT NURSE NOTE - OBJECTIVE STATEMENT
Pt bib EMS from [facility] [place] after a melanoma removal from left ear lobe and mew onset a-fib noted during post-op recovery monitoring.  Denies chest pain or shortness of breath.  Dry dressing present on left ear.

## 2019-03-14 NOTE — CONSULT NOTE ADULT - ASSESSMENT
pt with hx of htn who presented to er with new onset of a.fib post op melanoma removal.  ac if clear by surgery  beta blocker for rate control  cardiac enzyme  asa daily  tsh  echo  will follow up

## 2019-03-14 NOTE — BRIEF OPERATIVE NOTE - NSICDXBRIEFPROCEDURE_GEN_ALL_CORE_FT
PROCEDURES:  Wide excision of melanoma of lower extremity with application of split-thickness skin graft (STSG) if indicated 14-Mar-2019 06:40:20  Angel Gallardo
PROCEDURES:  Closure, wound, head, secondary 14-Mar-2019 10:00:10  Hetal Morales

## 2019-03-15 LAB
ANION GAP SERPL CALC-SCNC: 13 MMOL/L — SIGNIFICANT CHANGE UP (ref 5–17)
ANION GAP SERPL CALC-SCNC: 13 MMOL/L — SIGNIFICANT CHANGE UP (ref 5–17)
BUN SERPL-MCNC: 22 MG/DL — SIGNIFICANT CHANGE UP (ref 7–23)
BUN SERPL-MCNC: 24 MG/DL — HIGH (ref 7–23)
CALCIUM SERPL-MCNC: 9.1 MG/DL — SIGNIFICANT CHANGE UP (ref 8.4–10.5)
CALCIUM SERPL-MCNC: 9.4 MG/DL — SIGNIFICANT CHANGE UP (ref 8.4–10.5)
CHLORIDE SERPL-SCNC: 103 MMOL/L — SIGNIFICANT CHANGE UP (ref 96–108)
CHLORIDE SERPL-SCNC: 104 MMOL/L — SIGNIFICANT CHANGE UP (ref 96–108)
CO2 SERPL-SCNC: 21 MMOL/L — LOW (ref 22–31)
CO2 SERPL-SCNC: 22 MMOL/L — SIGNIFICANT CHANGE UP (ref 22–31)
CREAT SERPL-MCNC: 1.02 MG/DL — SIGNIFICANT CHANGE UP (ref 0.5–1.3)
CREAT SERPL-MCNC: 1.19 MG/DL — SIGNIFICANT CHANGE UP (ref 0.5–1.3)
GLUCOSE SERPL-MCNC: 103 MG/DL — HIGH (ref 70–99)
GLUCOSE SERPL-MCNC: 117 MG/DL — HIGH (ref 70–99)
HCT VFR BLD CALC: 44.4 % — SIGNIFICANT CHANGE UP (ref 39–50)
HCV AB S/CO SERPL IA: 0.08 S/CO — SIGNIFICANT CHANGE UP (ref 0–0.79)
HCV AB SERPL-IMP: SIGNIFICANT CHANGE UP
HGB BLD-MCNC: 14.9 G/DL — SIGNIFICANT CHANGE UP (ref 13–17)
MAGNESIUM SERPL-MCNC: 2.2 MG/DL — SIGNIFICANT CHANGE UP (ref 1.6–2.6)
MCHC RBC-ENTMCNC: 30.2 PG — SIGNIFICANT CHANGE UP (ref 27–34)
MCHC RBC-ENTMCNC: 33.6 GM/DL — SIGNIFICANT CHANGE UP (ref 32–36)
MCV RBC AUTO: 90.1 FL — SIGNIFICANT CHANGE UP (ref 80–100)
PHOSPHATE SERPL-MCNC: 3.4 MG/DL — SIGNIFICANT CHANGE UP (ref 2.5–4.5)
PLATELET # BLD AUTO: 175 K/UL — SIGNIFICANT CHANGE UP (ref 150–400)
POTASSIUM SERPL-MCNC: 4.2 MMOL/L — SIGNIFICANT CHANGE UP (ref 3.5–5.3)
POTASSIUM SERPL-MCNC: 4.3 MMOL/L — SIGNIFICANT CHANGE UP (ref 3.5–5.3)
POTASSIUM SERPL-SCNC: 4.2 MMOL/L — SIGNIFICANT CHANGE UP (ref 3.5–5.3)
POTASSIUM SERPL-SCNC: 4.3 MMOL/L — SIGNIFICANT CHANGE UP (ref 3.5–5.3)
RBC # BLD: 4.93 M/UL — SIGNIFICANT CHANGE UP (ref 4.2–5.8)
RBC # FLD: 13 % — SIGNIFICANT CHANGE UP (ref 10.3–14.5)
SODIUM SERPL-SCNC: 137 MMOL/L — SIGNIFICANT CHANGE UP (ref 135–145)
SODIUM SERPL-SCNC: 139 MMOL/L — SIGNIFICANT CHANGE UP (ref 135–145)
TSH SERPL-MCNC: 0.59 UIU/ML — SIGNIFICANT CHANGE UP (ref 0.27–4.2)
WBC # BLD: 6.91 K/UL — SIGNIFICANT CHANGE UP (ref 3.8–10.5)
WBC # FLD AUTO: 6.91 K/UL — SIGNIFICANT CHANGE UP (ref 3.8–10.5)

## 2019-03-15 PROCEDURE — 93306 TTE W/DOPPLER COMPLETE: CPT | Mod: 26

## 2019-03-15 RX ORDER — APIXABAN 2.5 MG/1
5 TABLET, FILM COATED ORAL EVERY 12 HOURS
Qty: 0 | Refills: 0 | Status: DISCONTINUED | OUTPATIENT
Start: 2019-03-15 | End: 2019-03-19

## 2019-03-15 RX ADMIN — ENOXAPARIN SODIUM 100 MILLIGRAM(S): 100 INJECTION SUBCUTANEOUS at 05:19

## 2019-03-15 RX ADMIN — Medication 81 MILLIGRAM(S): at 13:11

## 2019-03-15 RX ADMIN — Medication 50 MILLIGRAM(S): at 18:13

## 2019-03-15 RX ADMIN — Medication 50 MILLIGRAM(S): at 05:19

## 2019-03-15 RX ADMIN — APIXABAN 5 MILLIGRAM(S): 2.5 TABLET, FILM COATED ORAL at 18:13

## 2019-03-15 NOTE — PROGRESS NOTE ADULT - ASSESSMENT
pt  with h/o  htn.  hld.  melanoma  of   ear lobe/  prior  excision     and   today, had  excision of  melanoma, of  left  ear pinna    tele/  afib   echo pending    card  eval   lopressor  bid/   eliquis bid/   ,   cleared  by  dr martinez   for a/c  chandana, per  card for  conversion pt  with h/o  htn.  hld.  melanoma  of   ear lobe/  prior  excision     and   today, had  excision of  melanoma, of  left  ear pinna    tele/  afib   echo pending    card  eval   lopressor  bid/   eliquis bid/   ,   cleared  by  dr martinez   for a/c   elctrical  cardioversion per  card

## 2019-03-15 NOTE — CHART NOTE - NSCHARTNOTEFT_GEN_A_CORE
Interval events    Patient had 4 beats of wide complex tachycardia on tele. Asymptomatic    Vital Signs Last 24 Hrs  T(C): 36.6 (15 Mar 2019 19:50), Max: 37 (15 Mar 2019 11:53)  T(F): 97.8 (15 Mar 2019 19:50), Max: 98.6 (15 Mar 2019 11:53)  HR: 87 (15 Mar 2019 19:50) (76 - 88)  BP: 134/92 (15 Mar 2019 19:50) (130/79 - 160/90)  BP(mean): --  RR: 18 (15 Mar 2019 19:50) (18 - 20)  SpO2: 97% (15 Mar 2019 19:50) (96% - 98%)    pt  with Afib, cardioversion pending    1.Unsusatined WCT    Electrolytes stat  Supplement electrolytes PRN  C/W BB  Will follow closely    Lupe Vigil [facility] [place]  [id number]

## 2019-03-15 NOTE — PROGRESS NOTE ADULT - SUBJECTIVE AND OBJECTIVE BOX
CARDIOLOGY     PROGRESS  NOTE   ________________________________________________    CHIEF COMPLAINT:Patient is a 68y old  Male who presents with a chief complaint of AF (14 Mar 2019 17:26)    	  REVIEW OF SYSTEMS:  CONSTITUTIONAL: No fever, weight loss, or fatigue  EYES: No eye pain, visual disturbances, or discharge  ENT:  No difficulty hearing, tinnitus, vertigo; No sinus or throat pain  NECK: No pain or stiffness  RESPIRATORY: No cough, wheezing, chills or hemoptysis; No Shortness of Breath  CARDIOVASCULAR: No chest pain, palpitations, passing out, dizziness, or leg swelling  GASTROINTESTINAL: No abdominal or epigastric pain. No nausea, vomiting, or hematemesis; No diarrhea or constipation. No melena or hematochezia.  GENITOURINARY: No dysuria, frequency, hematuria, or incontinence  NEUROLOGICAL: No headaches, memory loss, loss of strength, numbness, or tremors  SKIN: No itching, burning, rashes, or lesions   LYMPH Nodes: No enlarged glands  ENDOCRINE: No heat or cold intolerance; No hair loss  MUSCULOSKELETAL: No joint pain or swelling; No muscle, back, or extremity pain  PSYCHIATRIC: No depression, anxiety, mood swings, or difficulty sleeping  HEME/LYMPH: No easy bruising, or bleeding gums  ALLERGY AND IMMUNOLOGIC: No hives or eczema	    [ ] All others negative	  [ ] Unable to obtain    PHYSICAL EXAM:  T(C): 36.5 (03-15-19 @ 04:43), Max: 36.6 (03-14-19 @ 20:20)  HR: 82 (03-15-19 @ 04:43) (76 - 88)  BP: 148/87 (03-15-19 @ 04:43) (115/77 - 175/114)  RR: 19 (03-15-19 @ 04:43) (16 - 21)  SpO2: 96% (03-15-19 @ 04:43) (96% - 99%)  Wt(kg): --  I&O's Summary    14 Mar 2019 07:01  -  15 Mar 2019 07:00  --------------------------------------------------------  IN: 360 mL / OUT: 0 mL / NET: 360 mL    15 Mar 2019 07:01  -  15 Mar 2019 09:22  --------------------------------------------------------  IN: 240 mL / OUT: 0 mL / NET: 240 mL        Appearance: Normal	  HEENT:   Normal oral mucosa, PERRL, EOMI	  Lymphatic: No lymphadenopathy  Cardiovascular: Normal S1 S2, No JVD, + murmurs, No edema  Respiratory: Lungs clear to auscultation	  Psychiatry: A & O x 3, Mood & affect appropriate  Gastrointestinal:  Soft, Non-tender, + BS	  Skin: No rashes, No ecchymoses, No cyanosis	  Neurologic: Non-focal  Extremities: Normal range of motion, No clubbing, cyanosis or edema  Vascular: Peripheral pulses palpable 2+ bilaterally    MEDICATIONS  (STANDING):  aspirin enteric coated 81 milliGRAM(s) Oral daily  enoxaparin Injectable 100 milliGRAM(s) SubCutaneous two times a day  metoprolol tartrate 50 milliGRAM(s) Oral two times a day      TELEMETRY: 	    ECG:  	  RADIOLOGY:  OTHER: 	  	  LABS:	 	    CARDIAC MARKERS:                                16.0   4.5   )-----------( 149      ( 14 Mar 2019 13:12 )             46.5     03-15    137  |  103  |  22  ----------------------------<  117<H>  4.3   |  21<L>  |  1.02    Ca    9.1      15 Mar 2019 06:17    TPro  6.6  /  Alb  3.9  /  TBili  0.4  /  DBili  x   /  AST  17  /  ALT  14  /  AlkPhos  66  03-14    proBNP:   Lipid Profile:   HgA1c:   TSH:   PT/INR - ( 14 Mar 2019 13:12 )   PT: 12.2 sec;   INR: 1.06 ratio         PTT - ( 14 Mar 2019 13:12 )  PTT:33.5 sec      Assessment and plan  ---------------------------  pt admitted with AF, still with a.fib  ac/echo will consider cardioversion

## 2019-03-15 NOTE — PROVIDER CONTACT NOTE (OTHER) - ASSESSMENT
Telemetry reported that pt had 4 beats of WCT.  VS as follow: /92, temp 97.8 F oral, HR 87, SPO2 97% on room air. Telemetry reported that pt had 4 beats of WCT.  VS as follow: /92, temp 97.8 F oral, HR 87, SPO2 97% on room air.  Pt is asymptomatic.  Sitting up in chair.  A+Ox4, in no distress.

## 2019-03-15 NOTE — PROVIDER CONTACT NOTE (OTHER) - ACTION/TREATMENT ORDERED:
KRISTINE Vigil made aware.  Will be ordering heparin drip. KRISTINE Vigil made aware.  Will be ordering electrolytes.

## 2019-03-15 NOTE — PROGRESS NOTE ADULT - SUBJECTIVE AND OBJECTIVE BOX
tele. af    REVIEW OF SYSTEMS:  GEN: no fever,    no chills  RESP: no SOB,   no cough  CVS: no chest pain,   no palpitations  GI: no abdominal pain,   no nausea,   no vomiting,   no constipation,   no diarrhea  : no dysuria,   no frequency  NEURO: no headache,   no dizziness  PSYCH: no depression,   not anxious  Derm : no rash    MEDICATIONS  (STANDING):  aspirin enteric coated 81 milliGRAM(s) Oral daily  enoxaparin Injectable 100 milliGRAM(s) SubCutaneous two times a day  metoprolol tartrate 50 milliGRAM(s) Oral two times a day    MEDICATIONS  (PRN):  oxyCODONE    5 mG/acetaminophen 325 mG 1 Tablet(s) Oral every 4 hours PRN Moderate Pain (4 - 6)      Vital Signs Last 24 Hrs  T(C): 36.5 (15 Mar 2019 04:43), Max: 36.6 (14 Mar 2019 20:20)  T(F): 97.7 (15 Mar 2019 04:43), Max: 97.9 (14 Mar 2019 20:20)  HR: 82 (15 Mar 2019 04:43) (76 - 88)  BP: 148/87 (15 Mar 2019 04:43) (115/77 - 175/114)  BP(mean): --  RR: 19 (15 Mar 2019 04:43) (16 - 21)  SpO2: 96% (15 Mar 2019 04:43) (96% - 99%)  CAPILLARY BLOOD GLUCOSE        I&O's Summary    14 Mar 2019 07:01  -  15 Mar 2019 07:00  --------------------------------------------------------  IN: 360 mL / OUT: 0 mL / NET: 360 mL    15 Mar 2019 07:01  -  15 Mar 2019 09:56  --------------------------------------------------------  IN: 240 mL / OUT: 0 mL / NET: 240 mL        PHYSICAL EXAM:  HEAD:  Atraumatic, Normocephalic  NECK: Supple, No   JVD  CHEST/LUNG:   no     rales,     no,    rhonchi  HEART: Regular rate and rhythm;         murmur  ABDOMEN: Soft, Nontender, ;   EXTREMITIES:   no     edema  NEUROLOGY:  alert    LABS:                        14.9   6.91  )-----------( 175      ( 15 Mar 2019 09:19 )             44.4     03-15    137  |  103  |  22  ----------------------------<  117<H>  4.3   |  21<L>  |  1.02    Ca    9.1      15 Mar 2019 06:17    TPro  6.6  /  Alb  3.9  /  TBili  0.4  /  DBili  x   /  AST  17  /  ALT  14  /  AlkPhos  66  03-14    PT/INR - ( 14 Mar 2019 13:12 )   PT: 12.2 sec;   INR: 1.06 ratio         PTT - ( 14 Mar 2019 13:12 )  PTT:33.5 sec                        Consultant(s) Notes Reviewed:      Care Discussed with Consultants/Other Providers:

## 2019-03-16 LAB
ANION GAP SERPL CALC-SCNC: 12 MMOL/L — SIGNIFICANT CHANGE UP (ref 5–17)
BUN SERPL-MCNC: 20 MG/DL — SIGNIFICANT CHANGE UP (ref 7–23)
CALCIUM SERPL-MCNC: 9 MG/DL — SIGNIFICANT CHANGE UP (ref 8.4–10.5)
CHLORIDE SERPL-SCNC: 106 MMOL/L — SIGNIFICANT CHANGE UP (ref 96–108)
CO2 SERPL-SCNC: 22 MMOL/L — SIGNIFICANT CHANGE UP (ref 22–31)
CREAT SERPL-MCNC: 1.12 MG/DL — SIGNIFICANT CHANGE UP (ref 0.5–1.3)
GLUCOSE SERPL-MCNC: 94 MG/DL — SIGNIFICANT CHANGE UP (ref 70–99)
MAGNESIUM SERPL-MCNC: 2.2 MG/DL — SIGNIFICANT CHANGE UP (ref 1.6–2.6)
PHOSPHATE SERPL-MCNC: 3 MG/DL — SIGNIFICANT CHANGE UP (ref 2.5–4.5)
POTASSIUM SERPL-MCNC: 4.2 MMOL/L — SIGNIFICANT CHANGE UP (ref 3.5–5.3)
POTASSIUM SERPL-SCNC: 4.2 MMOL/L — SIGNIFICANT CHANGE UP (ref 3.5–5.3)
SODIUM SERPL-SCNC: 140 MMOL/L — SIGNIFICANT CHANGE UP (ref 135–145)
TSH SERPL-MCNC: 2.79 UIU/ML — SIGNIFICANT CHANGE UP (ref 0.27–4.2)

## 2019-03-16 RX ADMIN — APIXABAN 5 MILLIGRAM(S): 2.5 TABLET, FILM COATED ORAL at 05:15

## 2019-03-16 RX ADMIN — APIXABAN 5 MILLIGRAM(S): 2.5 TABLET, FILM COATED ORAL at 17:08

## 2019-03-16 RX ADMIN — Medication 50 MILLIGRAM(S): at 05:15

## 2019-03-16 RX ADMIN — Medication 81 MILLIGRAM(S): at 09:33

## 2019-03-16 RX ADMIN — Medication 50 MILLIGRAM(S): at 17:08

## 2019-03-16 NOTE — PROGRESS NOTE ADULT - SUBJECTIVE AND OBJECTIVE BOX
CARDIOLOGY     PROGRESS  NOTE   ________________________________________________    CHIEF COMPLAINT:Patient is a 68y old  Male who presents with a chief complaint of AF (15 Mar 2019 09:56)    	  REVIEW OF SYSTEMS:  CONSTITUTIONAL: No fever, weight loss, or fatigue  EYES: No eye pain, visual disturbances, or discharge  ENT:  No difficulty hearing, tinnitus, vertigo; No sinus or throat pain  NECK: No pain or stiffness  RESPIRATORY: No cough, wheezing, chills or hemoptysis; No Shortness of Breath  CARDIOVASCULAR: No chest pain, palpitations, passing out, dizziness, or leg swelling  GASTROINTESTINAL: No abdominal or epigastric pain. No nausea, vomiting, or hematemesis; No diarrhea or constipation. No melena or hematochezia.  GENITOURINARY: No dysuria, frequency, hematuria, or incontinence  NEUROLOGICAL: No headaches, memory loss, loss of strength, numbness, or tremors  SKIN: No itching, burning, rashes, or lesions   LYMPH Nodes: No enlarged glands  ENDOCRINE: No heat or cold intolerance; No hair loss  MUSCULOSKELETAL: No joint pain or swelling; No muscle, back, or extremity pain  PSYCHIATRIC: No depression, anxiety, mood swings, or difficulty sleeping  HEME/LYMPH: No easy bruising, or bleeding gums  ALLERGY AND IMMUNOLOGIC: No hives or eczema	    [ ] All others negative	  [ ] Unable to obtain    PHYSICAL EXAM:  T(C): 36.6 (03-15-19 @ 19:50), Max: 37 (03-15-19 @ 11:53)  HR: 85 (03-16-19 @ 05:09) (76 - 87)  BP: 151/92 (03-16-19 @ 05:09) (130/79 - 151/99)  RR: 18 (03-16-19 @ 05:09) (18 - 18)  SpO2: 96% (03-16-19 @ 05:09) (96% - 98%)  Wt(kg): --  I&O's Summary    15 Mar 2019 07:01  -  16 Mar 2019 07:00  --------------------------------------------------------  IN: 720 mL / OUT: 0 mL / NET: 720 mL        Appearance: Normal	  HEENT:   Normal oral mucosa, PERRL, EOMI	  Lymphatic: No lymphadenopathy  Cardiovascular: Normal S1 S2, No JVD, No murmurs, No edema  Respiratory: Lungs clear to auscultation	  Psychiatry: A & O x 3, Mood & affect appropriate  Gastrointestinal:  Soft, Non-tender, + BS	  Skin: No rashes, No ecchymoses, No cyanosis	  Neurologic: Non-focal  Extremities: Normal range of motion, No clubbing, cyanosis or edema  Vascular: Peripheral pulses palpable 2+ bilaterally    MEDICATIONS  (STANDING):  apixaban 5 milliGRAM(s) Oral every 12 hours  aspirin enteric coated 81 milliGRAM(s) Oral daily  metoprolol tartrate 50 milliGRAM(s) Oral two times a day      TELEMETRY: 	    ECG:  	  RADIOLOGY:  OTHER: 	  	  LABS:	 	    CARDIAC MARKERS:                                14.9   6.91  )-----------( 175      ( 15 Mar 2019 09:19 )             44.4     03-16    140  |  106  |  20  ----------------------------<  94  4.2   |  22  |  1.12    Ca    9.0      16 Mar 2019 05:59  Phos  3.0     03-16  Mg     2.2     03-16    TPro  6.6  /  Alb  3.9  /  TBili  0.4  /  DBili  x   /  AST  17  /  ALT  14  /  AlkPhos  66  03-14    proBNP:   Lipid Profile:   HgA1c:   TSH: Thyroid Stimulating Hormone, Serum: 0.59 uIU/mL (03-15 @ 14:34)    PT/INR - ( 14 Mar 2019 13:12 )   PT: 12.2 sec;   INR: 1.06 ratio         PTT - ( 14 Mar 2019 13:12 )  PTT:33.5 sec      Assessment and plan  ---------------------------  stress test in am sec to wct  ?guerda /cardioversion on monday  continue ac

## 2019-03-16 NOTE — PROGRESS NOTE ADULT - ASSESSMENT
pt  with h/o  htn.  hld.  melanoma  of   ear lobe/  prior  excision     and   today, had  excision of  melanoma, of  left  ear pinna    tele/  afib   echo , normal ef/  normal la      lopressor  bid/   eliquis bid/   ,   cleared  by  dr martinez   for a/c   electrical  cardioversion per  card , next week  still in afib

## 2019-03-16 NOTE — PROGRESS NOTE ADULT - SUBJECTIVE AND OBJECTIVE BOX
tele, afib  REVIEW OF SYSTEMS:  GEN: no fever,    no chills  RESP: no SOB,   no cough  CVS: no chest pain,   no palpitations  GI: no abdominal pain,   no nausea,   no vomiting,   no constipation,   no diarrhea  : no dysuria,   no frequency  NEURO: no headache,   no dizziness  PSYCH: no depression,   not anxious  Derm : no rash    MEDICATIONS  (STANDING):  apixaban 5 milliGRAM(s) Oral every 12 hours  aspirin enteric coated 81 milliGRAM(s) Oral daily  metoprolol tartrate 50 milliGRAM(s) Oral two times a day    MEDICATIONS  (PRN):  oxyCODONE    5 mG/acetaminophen 325 mG 1 Tablet(s) Oral every 4 hours PRN Moderate Pain (4 - 6)      Vital Signs Last 24 Hrs  T(C): 37 (16 Mar 2019 11:28), Max: 37 (16 Mar 2019 11:28)  T(F): 98.6 (16 Mar 2019 11:28), Max: 98.6 (16 Mar 2019 11:28)  HR: 78 (16 Mar 2019 11:28) (77 - 87)  BP: 159/84 (16 Mar 2019 11:28) (130/79 - 159/84)  BP(mean): --  RR: 18 (16 Mar 2019 11:28) (18 - 18)  SpO2: 97% (16 Mar 2019 11:28) (96% - 97%)  CAPILLARY BLOOD GLUCOSE        I&O's Summary    15 Mar 2019 07:01  -  16 Mar 2019 07:00  --------------------------------------------------------  IN: 720 mL / OUT: 0 mL / NET: 720 mL    16 Mar 2019 07:01  -  16 Mar 2019 12:59  --------------------------------------------------------  IN: 480 mL / OUT: 0 mL / NET: 480 mL        PHYSICAL EXAM:  HEAD:  Atraumatic, Normocephalic  NECK: Supple, No   JVD  CHEST/LUNG:   no     rales,     no,    rhonchi  HEART: irr Regular rate and rhythm;         murmur  ABDOMEN: Soft, Nontender, ;   EXTREMITIES:      no  edema  NEUROLOGY:  alert    LABS:                        14.9   6.91  )-----------( 175      ( 15 Mar 2019 09:19 )             44.4     03-16    140  |  106  |  20  ----------------------------<  94  4.2   |  22  |  1.12    Ca    9.0      16 Mar 2019 05:59  Phos  3.0     03-16  Mg     2.2     03-16    TPro  6.6  /  Alb  3.9  /  TBili  0.4  /  DBili  x   /  AST  17  /  ALT  14  /  AlkPhos  66  03-14    PT/INR - ( 14 Mar 2019 13:12 )   PT: 12.2 sec;   INR: 1.06 ratio         PTT - ( 14 Mar 2019 13:12 )  PTT:33.5 sec                Thyroid Stimulating Hormone, Serum: 0.59 uIU/mL (03-15 @ 14:34)          Consultant(s) Notes Reviewed:      Care Discussed with Consultants/Other Providers:

## 2019-03-17 LAB
ANION GAP SERPL CALC-SCNC: 12 MMOL/L — SIGNIFICANT CHANGE UP (ref 5–17)
BUN SERPL-MCNC: 20 MG/DL — SIGNIFICANT CHANGE UP (ref 7–23)
CALCIUM SERPL-MCNC: 9.3 MG/DL — SIGNIFICANT CHANGE UP (ref 8.4–10.5)
CHLORIDE SERPL-SCNC: 102 MMOL/L — SIGNIFICANT CHANGE UP (ref 96–108)
CO2 SERPL-SCNC: 23 MMOL/L — SIGNIFICANT CHANGE UP (ref 22–31)
CREAT SERPL-MCNC: 1.1 MG/DL — SIGNIFICANT CHANGE UP (ref 0.5–1.3)
GLUCOSE SERPL-MCNC: 94 MG/DL — SIGNIFICANT CHANGE UP (ref 70–99)
HCT VFR BLD CALC: 46.1 % — SIGNIFICANT CHANGE UP (ref 39–50)
HGB BLD-MCNC: 15.3 G/DL — SIGNIFICANT CHANGE UP (ref 13–17)
MCHC RBC-ENTMCNC: 29.7 PG — SIGNIFICANT CHANGE UP (ref 27–34)
MCHC RBC-ENTMCNC: 33.2 GM/DL — SIGNIFICANT CHANGE UP (ref 32–36)
MCV RBC AUTO: 89.5 FL — SIGNIFICANT CHANGE UP (ref 80–100)
PLATELET # BLD AUTO: 172 K/UL — SIGNIFICANT CHANGE UP (ref 150–400)
POTASSIUM SERPL-MCNC: 4 MMOL/L — SIGNIFICANT CHANGE UP (ref 3.5–5.3)
POTASSIUM SERPL-SCNC: 4 MMOL/L — SIGNIFICANT CHANGE UP (ref 3.5–5.3)
RBC # BLD: 5.15 M/UL — SIGNIFICANT CHANGE UP (ref 4.2–5.8)
RBC # FLD: 13 % — SIGNIFICANT CHANGE UP (ref 10.3–14.5)
SODIUM SERPL-SCNC: 137 MMOL/L — SIGNIFICANT CHANGE UP (ref 135–145)
TROPONIN T, HIGH SENSITIVITY RESULT: 7 NG/L — SIGNIFICANT CHANGE UP (ref 0–51)
WBC # BLD: 5.37 K/UL — SIGNIFICANT CHANGE UP (ref 3.8–10.5)
WBC # FLD AUTO: 5.37 K/UL — SIGNIFICANT CHANGE UP (ref 3.8–10.5)

## 2019-03-17 PROCEDURE — 93018 CV STRESS TEST I&R ONLY: CPT

## 2019-03-17 PROCEDURE — 93016 CV STRESS TEST SUPVJ ONLY: CPT

## 2019-03-17 PROCEDURE — 78452 HT MUSCLE IMAGE SPECT MULT: CPT | Mod: 26

## 2019-03-17 RX ORDER — LISINOPRIL 2.5 MG/1
10 TABLET ORAL DAILY
Qty: 0 | Refills: 0 | Status: DISCONTINUED | OUTPATIENT
Start: 2019-03-17 | End: 2019-03-19

## 2019-03-17 RX ADMIN — Medication 50 MILLIGRAM(S): at 17:05

## 2019-03-17 RX ADMIN — Medication 81 MILLIGRAM(S): at 08:27

## 2019-03-17 RX ADMIN — LISINOPRIL 10 MILLIGRAM(S): 2.5 TABLET ORAL at 12:04

## 2019-03-17 RX ADMIN — Medication 50 MILLIGRAM(S): at 01:10

## 2019-03-17 RX ADMIN — APIXABAN 5 MILLIGRAM(S): 2.5 TABLET, FILM COATED ORAL at 05:46

## 2019-03-17 RX ADMIN — APIXABAN 5 MILLIGRAM(S): 2.5 TABLET, FILM COATED ORAL at 17:05

## 2019-03-17 NOTE — PROGRESS NOTE ADULT - SUBJECTIVE AND OBJECTIVE BOX
tele, afib    REVIEW OF SYSTEMS:  GEN: no fever,    no chills  RESP: no SOB,   no cough  CVS: no chest pain,   no palpitations  GI: no abdominal pain,   no nausea,   no vomiting,   no constipation,   no diarrhea  : no dysuria,   no frequency  NEURO: no headache,   no dizziness  PSYCH: no depression,   not anxious  Derm : no rash    MEDICATIONS  (STANDING):  apixaban 5 milliGRAM(s) Oral every 12 hours  aspirin enteric coated 81 milliGRAM(s) Oral daily  metoprolol tartrate 50 milliGRAM(s) Oral two times a day    MEDICATIONS  (PRN):  oxyCODONE    5 mG/acetaminophen 325 mG 1 Tablet(s) Oral every 4 hours PRN Moderate Pain (4 - 6)      Vital Signs Last 24 Hrs  T(C): 36.4 (17 Mar 2019 04:24), Max: 37 (16 Mar 2019 11:28)  T(F): 97.5 (17 Mar 2019 04:24), Max: 98.6 (16 Mar 2019 11:28)  HR: 73 (17 Mar 2019 08:10) (73 - 82)  BP: 151/94 (17 Mar 2019 08:10) (148/102 - 169/93)  BP(mean): --  RR: 18 (17 Mar 2019 08:10) (18 - 18)  SpO2: 97% (17 Mar 2019 08:10) (97% - 99%)  CAPILLARY BLOOD GLUCOSE        I&O's Summary    16 Mar 2019 07:01  -  17 Mar 2019 07:00  --------------------------------------------------------  IN: 920 mL / OUT: 0 mL / NET: 920 mL    17 Mar 2019 07:01  -  17 Mar 2019 09:21  --------------------------------------------------------  IN: 240 mL / OUT: 0 mL / NET: 240 mL        PHYSICAL EXAM:  HEAD:  Atraumatic, Normocephalic  NECK: Supple, No   JVD  CHEST/LUNG:   no     rales,     no,    rhonchi  HEART: Regular rate and rhythm;         murmur  ABDOMEN: Soft, Nontender, ;   EXTREMITIES:    no    edema  NEUROLOGY:  alert    LABS:                        15.3   5.37  )-----------( 172      ( 17 Mar 2019 08:54 )             46.1     03-17    137  |  102  |  20  ----------------------------<  94  4.0   |  23  |  1.10    Ca    9.3      17 Mar 2019 07:03  Phos  3.0     03-16  Mg     2.2     03-16                      Thyroid Stimulating Hormone, Serum: 2.79 uIU/mL (03-16 @ 13:48)          Consultant(s) Notes Reviewed:      Care Discussed with Consultants/Other Providers:

## 2019-03-17 NOTE — PROGRESS NOTE ADULT - SUBJECTIVE AND OBJECTIVE BOX
CARDIOLOGY     PROGRESS  NOTE   ________________________________________________    CHIEF COMPLAINT:Patient is a 68y old  Male who presents with a chief complaint of AF (17 Mar 2019 09:21)  no complain.  	  REVIEW OF SYSTEMS:  CONSTITUTIONAL: No fever, weight loss, or fatigue  EYES: No eye pain, visual disturbances, or discharge  ENT:  No difficulty hearing, tinnitus, vertigo; No sinus or throat pain  NECK: No pain or stiffness  RESPIRATORY: No cough, wheezing, chills or hemoptysis; No Shortness of Breath  CARDIOVASCULAR: No chest pain, palpitations, passing out, dizziness, or leg swelling  GASTROINTESTINAL: No abdominal or epigastric pain. No nausea, vomiting, or hematemesis; No diarrhea or constipation. No melena or hematochezia.  GENITOURINARY: No dysuria, frequency, hematuria, or incontinence  NEUROLOGICAL: No headaches, memory loss, loss of strength, numbness, or tremors  SKIN: No itching, burning, rashes, or lesions   LYMPH Nodes: No enlarged glands  ENDOCRINE: No heat or cold intolerance; No hair loss  MUSCULOSKELETAL: No joint pain or swelling; No muscle, back, or extremity pain  PSYCHIATRIC: No depression, anxiety, mood swings, or difficulty sleeping  HEME/LYMPH: No easy bruising, or bleeding gums  ALLERGY AND IMMUNOLOGIC: No hives or eczema	    [ ] All others negative	  [ ] Unable to obtain    PHYSICAL EXAM:  T(C): 36.4 (03-17-19 @ 04:24), Max: 37 (03-16-19 @ 11:28)  HR: 73 (03-17-19 @ 08:10) (73 - 82)  BP: 151/94 (03-17-19 @ 08:10) (148/102 - 169/93)  RR: 18 (03-17-19 @ 08:10) (18 - 18)  SpO2: 97% (03-17-19 @ 08:10) (97% - 99%)  Wt(kg): --  I&O's Summary    16 Mar 2019 07:01  -  17 Mar 2019 07:00  --------------------------------------------------------  IN: 920 mL / OUT: 0 mL / NET: 920 mL    17 Mar 2019 07:01  -  17 Mar 2019 09:31  --------------------------------------------------------  IN: 240 mL / OUT: 0 mL / NET: 240 mL        Appearance: Normal	  HEENT:   Normal oral mucosa, PERRL, EOMI	  Lymphatic: No lymphadenopathy  Cardiovascular: Normal S1 S2, No JVD, +murmurs, No edema  Respiratory: Lungs clear to auscultation	  Psychiatry: A & O x 3, Mood & affect appropriate  Gastrointestinal:  Soft, Non-tender, + BS	  Skin: No rashes, No ecchymoses, No cyanosis	  Neurologic: Non-focal  Extremities: Normal range of motion, No clubbing, cyanosis or edema  Vascular: Peripheral pulses palpable 2+ bilaterally    MEDICATIONS  (STANDING):  apixaban 5 milliGRAM(s) Oral every 12 hours  aspirin enteric coated 81 milliGRAM(s) Oral daily  metoprolol tartrate 50 milliGRAM(s) Oral two times a day      TELEMETRY: 	    ECG:  	  RADIOLOGY:  OTHER: 	  	  LABS:	 	    CARDIAC MARKERS:                                15.3   5.37  )-----------( 172      ( 17 Mar 2019 08:54 )             46.1     03-17    137  |  102  |  20  ----------------------------<  94  4.0   |  23  |  1.10    Ca    9.3      17 Mar 2019 07:03  Phos  3.0     03-16  Mg     2.2     03-16      proBNP:   Lipid Profile:   HgA1c:   TSH: Thyroid Stimulating Hormone, Serum: 2.79 uIU/mL (03-16 @ 13:48)  Thyroid Stimulating Hormone, Serum: 0.59 uIU/mL (03-15 @ 14:34)          Assessment and plan  ---------------------------  awaiting stress test  ?guerda/cardioversion tomorrow  continue beta blocker, will adjust meds

## 2019-03-17 NOTE — PROGRESS NOTE ADULT - ASSESSMENT
pt  with h/o  htn.  hld.  melanoma  of   ear lobe/  prior  excision     and   today, had  excision of  melanoma, of  left  ear pinna    tele/  afib   echo , normal ef/  normal la      lopressor  bid/   eliquis bid/   ,   cleared  by  dr martinez   for a/c   electrical  cardioversion per  card ,  on monday  still in afib,/ on eliquis

## 2019-03-18 ENCOUNTER — TRANSCRIPTION ENCOUNTER (OUTPATIENT)
Age: 69
End: 2019-03-18

## 2019-03-18 LAB
ANION GAP SERPL CALC-SCNC: 11 MMOL/L — SIGNIFICANT CHANGE UP (ref 5–17)
BUN SERPL-MCNC: 28 MG/DL — HIGH (ref 7–23)
CALCIUM SERPL-MCNC: 9.5 MG/DL — SIGNIFICANT CHANGE UP (ref 8.4–10.5)
CHLORIDE SERPL-SCNC: 105 MMOL/L — SIGNIFICANT CHANGE UP (ref 96–108)
CO2 SERPL-SCNC: 24 MMOL/L — SIGNIFICANT CHANGE UP (ref 22–31)
CREAT SERPL-MCNC: 1.35 MG/DL — HIGH (ref 0.5–1.3)
GLUCOSE SERPL-MCNC: 104 MG/DL — HIGH (ref 70–99)
MAGNESIUM SERPL-MCNC: 2.1 MG/DL — SIGNIFICANT CHANGE UP (ref 1.6–2.6)
POTASSIUM SERPL-MCNC: 4.3 MMOL/L — SIGNIFICANT CHANGE UP (ref 3.5–5.3)
POTASSIUM SERPL-SCNC: 4.3 MMOL/L — SIGNIFICANT CHANGE UP (ref 3.5–5.3)
SODIUM SERPL-SCNC: 140 MMOL/L — SIGNIFICANT CHANGE UP (ref 135–145)

## 2019-03-18 PROCEDURE — 93325 DOPPLER ECHO COLOR FLOW MAPG: CPT | Mod: 26

## 2019-03-18 PROCEDURE — 93320 DOPPLER ECHO COMPLETE: CPT | Mod: 26

## 2019-03-18 PROCEDURE — 93010 ELECTROCARDIOGRAM REPORT: CPT

## 2019-03-18 PROCEDURE — 93312 ECHO TRANSESOPHAGEAL: CPT | Mod: 26

## 2019-03-18 RX ORDER — METOPROLOL TARTRATE 50 MG
1 TABLET ORAL
Qty: 60 | Refills: 0 | OUTPATIENT
Start: 2019-03-18 | End: 2019-04-16

## 2019-03-18 RX ORDER — APIXABAN 2.5 MG/1
1 TABLET, FILM COATED ORAL
Qty: 60 | Refills: 0
Start: 2019-03-18 | End: 2019-04-16

## 2019-03-18 RX ORDER — LISINOPRIL 2.5 MG/1
1 TABLET ORAL
Qty: 30 | Refills: 0 | OUTPATIENT
Start: 2019-03-18 | End: 2019-04-16

## 2019-03-18 RX ADMIN — LISINOPRIL 10 MILLIGRAM(S): 2.5 TABLET ORAL at 05:10

## 2019-03-18 RX ADMIN — OXYCODONE AND ACETAMINOPHEN 1 TABLET(S): 5; 325 TABLET ORAL at 21:30

## 2019-03-18 RX ADMIN — Medication 50 MILLIGRAM(S): at 05:10

## 2019-03-18 RX ADMIN — APIXABAN 5 MILLIGRAM(S): 2.5 TABLET, FILM COATED ORAL at 17:45

## 2019-03-18 RX ADMIN — OXYCODONE AND ACETAMINOPHEN 1 TABLET(S): 5; 325 TABLET ORAL at 21:08

## 2019-03-18 RX ADMIN — Medication 50 MILLIGRAM(S): at 17:45

## 2019-03-18 RX ADMIN — Medication 81 MILLIGRAM(S): at 17:45

## 2019-03-18 RX ADMIN — APIXABAN 5 MILLIGRAM(S): 2.5 TABLET, FILM COATED ORAL at 06:35

## 2019-03-18 NOTE — PROGRESS NOTE ADULT - SUBJECTIVE AND OBJECTIVE BOX
CARDIOLOGY     PROGRESS  NOTE   ________________________________________________    CHIEF COMPLAINT:Patient is a 68y old  Male who presents with a chief complaint of AF (18 Mar 2019 08:30)    	  REVIEW OF SYSTEMS:  CONSTITUTIONAL: No fever, weight loss, or fatigue  EYES: No eye pain, visual disturbances, or discharge  ENT:  No difficulty hearing, tinnitus, vertigo; No sinus or throat pain  NECK: No pain or stiffness  RESPIRATORY: No cough, wheezing, chills or hemoptysis; No Shortness of Breath  CARDIOVASCULAR: No chest pain, palpitations, passing out, dizziness, or leg swelling  GASTROINTESTINAL: No abdominal or epigastric pain. No nausea, vomiting, or hematemesis; No diarrhea or constipation. No melena or hematochezia.  GENITOURINARY: No dysuria, frequency, hematuria, or incontinence  NEUROLOGICAL: No headaches, memory loss, loss of strength, numbness, or tremors  SKIN: No itching, burning, rashes, or lesions   LYMPH Nodes: No enlarged glands  ENDOCRINE: No heat or cold intolerance; No hair loss  MUSCULOSKELETAL: No joint pain or swelling; No muscle, back, or extremity pain  PSYCHIATRIC: No depression, anxiety, mood swings, or difficulty sleeping  HEME/LYMPH: No easy bruising, or bleeding gums  ALLERGY AND IMMUNOLOGIC: No hives or eczema	    [ ] All others negative	  [ ] Unable to obtain    PHYSICAL EXAM:  T(C): 36.8 (03-18-19 @ 04:23), Max: 36.8 (03-18-19 @ 04:23)  HR: 69 (03-18-19 @ 04:23) (69 - 91)  BP: 145/80 (03-18-19 @ 04:23) (124/80 - 145/80)  RR: 18 (03-18-19 @ 04:23) (18 - 18)  SpO2: 97% (03-18-19 @ 04:23) (97% - 98%)  Wt(kg): --  I&O's Summary    17 Mar 2019 07:01  -  18 Mar 2019 07:00  --------------------------------------------------------  IN: 840 mL / OUT: 0 mL / NET: 840 mL        Appearance: Normal	  HEENT:   Normal oral mucosa, PERRL, EOMI	  Lymphatic: No lymphadenopathy  Cardiovascular: Normal S1 S2, No JVD, No murmurs, No edema  Respiratory: Lungs clear to auscultation	  Psychiatry: A & O x 3, Mood & affect appropriate  Gastrointestinal:  Soft, Non-tender, + BS	  Skin: No rashes, No ecchymoses, No cyanosis	  Neurologic: Non-focal  Extremities: Normal range of motion, No clubbing, cyanosis or edema  Vascular: Peripheral pulses palpable 2+ bilaterally    MEDICATIONS  (STANDING):  apixaban 5 milliGRAM(s) Oral every 12 hours  aspirin enteric coated 81 milliGRAM(s) Oral daily  lisinopril 10 milliGRAM(s) Oral daily  metoprolol tartrate 50 milliGRAM(s) Oral two times a day      TELEMETRY: 	    ECG:  	  RADIOLOGY:  OTHER: 	  	  LABS:	 	    CARDIAC MARKERS:                                15.3   5.37  )-----------( 172      ( 17 Mar 2019 08:54 )             46.1     03-18    140  |  105  |  28<H>  ----------------------------<  104<H>  4.3   |  24  |  1.35<H>    Ca    9.5      18 Mar 2019 06:45  Mg     2.1     03-18      proBNP:   Lipid Profile:   HgA1c:   TSH: Thyroid Stimulating Hormone, Serum: 2.79 uIU/mL (03-16 @ 13:48)  Thyroid Stimulating Hormone, Serum: 0.59 uIU/mL (03-15 @ 14:34)      < from: Nuclear Stress Test-Pharmacologic (03.17.19 @ 11:35) >  * Chest Pain: No chest pain with administration of  Regadenoson.  * Symptom: No Symptom.  * HR Response: Appropriate.  * BP Response: Appropriate.  * Heart Rhythm: Atrial Fibrillation - 83 BPM.  * Conduction defects: Early Repolarization.  * ECG Abnormalities: None.  * Arrhythmia: None.  * There is a small, mild defect in the mid to distal  infero-lateral wall that is reversible consistent with  ischemia.  * Post-stress gated wall motion analysis was performed  (LVEF = 59 %;LVEDV = 104 ml.), revealing mild hypokinesis  in distal inferolateral wall(s).  Conclusion:  There is a small, mild defect in the mid to distal  infero-lateral wall that is reversible consistent with  ischemia.    < end of copied text >      Assessment and plan  ---------------------------  a.fib  + stress test  cardioversion today  will consider cath after cardioversion

## 2019-03-18 NOTE — DISCHARGE NOTE PROVIDER - CARE PROVIDER_API CALL
jesus,   your PMD  Phone: (   )    -  Fax: (   )    -  Follow Up Time:     Bradley Ferrara (DO)  Cardiology Medicine  [address] [place] [address], [address] [address]  Phone: [phone]  Fax: [phone]  Follow Up Time: Bradley Ferrara (DO)  Cardiology Medicine  [address] [place] [address], [address]  [place] [address]  Phone: [phone]  Fax: [phone]  Follow Up Time: 2 weeks    jesus,   your PMD  Phone: (   )    -  Fax: (   )    -  Follow Up Time:

## 2019-03-18 NOTE — DISCHARGE NOTE PROVIDER - HOSPITAL COURSE
69yo M with hx of HTN on atenolol presents with new onset Afib after melanoma removal surgery. New onset Afib new on eliquis, BB and ACEI. Echo with EF 55%, unremarkable. few episodes of     WCT, s/p NST, abnormal,             pending  cardioversion and possible  cardiac cath after DCCV. 67yo M with hx of HTN on atenolol presents with new onset Afib after melanoma removal surgery. New onset Afib now on eliquis, BB and ACEI. Echo with EF 55%, unremarkable. Few episodes of WCT, s/p NST, abnormal. S/p cardioversion, now in sinus rhythm. Pt stable for discharge with close PMD and Cardiology follow up.

## 2019-03-18 NOTE — DISCHARGE NOTE PROVIDER - PROVIDER TOKENS
FREE:[LAST:[jesus],PHONE:[(   )    -],FAX:[(   )    -],ADDRESS:[your PMD]],PROVIDER:[TOKEN:[[id number]]] PROVIDER:[TOKEN:[[id number]],FOLLOWUP:[2 weeks]],FREE:[LAST:[jesus],PHONE:[(   )    -],FAX:[(   )    -],ADDRESS:[your PMD]]

## 2019-03-18 NOTE — PROGRESS NOTE ADULT - SUBJECTIVE AND OBJECTIVE BOX
no cp/sob  REVIEW OF SYSTEMS:  GEN: no fever,    no chills  RESP: no SOB,   no cough  CVS: no chest pain,   no palpitations  GI: no abdominal pain,   no nausea,   no vomiting,   no constipation,   no diarrhea  : no dysuria,   no frequency  NEURO: no headache,   no dizziness  PSYCH: no depression,   not anxious  Derm : no rash    MEDICATIONS  (STANDING):  apixaban 5 milliGRAM(s) Oral every 12 hours  aspirin enteric coated 81 milliGRAM(s) Oral daily  lisinopril 10 milliGRAM(s) Oral daily  metoprolol tartrate 50 milliGRAM(s) Oral two times a day    MEDICATIONS  (PRN):  oxyCODONE    5 mG/acetaminophen 325 mG 1 Tablet(s) Oral every 4 hours PRN Moderate Pain (4 - 6)      Vital Signs Last 24 Hrs  T(C): 36.8 (18 Mar 2019 04:23), Max: 36.8 (18 Mar 2019 04:23)  T(F): 98.2 (18 Mar 2019 04:23), Max: 98.2 (18 Mar 2019 04:23)  HR: 69 (18 Mar 2019 04:23) (69 - 91)  BP: 145/80 (18 Mar 2019 04:23) (124/80 - 145/80)  BP(mean): --  RR: 18 (18 Mar 2019 04:23) (18 - 18)  SpO2: 97% (18 Mar 2019 04:23) (97% - 98%)  CAPILLARY BLOOD GLUCOSE        I&O's Summary    17 Mar 2019 07:01  -  18 Mar 2019 07:00  --------------------------------------------------------  IN: 840 mL / OUT: 0 mL / NET: 840 mL        PHYSICAL EXAM:  HEAD:  Atraumatic, Normocephalic  NECK: Supple, No   JVD  CHEST/LUNG:   no     rales,     no,    rhonchi  HEART: Regular rate and rhythm;         murmur  ABDOMEN: Soft, Nontender, ;   EXTREMITIES:   no     edema  NEUROLOGY:  alert    LABS:                        15.3   5.37  )-----------( 172      ( 17 Mar 2019 08:54 )             46.1     03-18    140  |  105  |  28<H>  ----------------------------<  104<H>  4.3   |  24  |  1.35<H>    Ca    9.5      18 Mar 2019 06:45  Mg     2.1     03-18                      Thyroid Stimulating Hormone, Serum: 2.79 uIU/mL (03-16 @ 13:48)          Consultant(s) Notes Reviewed:      Care Discussed with Consultants/Other Providers:

## 2019-03-18 NOTE — DISCHARGE NOTE PROVIDER - NSDCCPCAREPLAN_GEN_ALL_CORE_FT
Call patient. In general Mirapex can be substituted for ropinirole. If I were making a change I would recommend the 0.25 mg dose of Mirapex initially in place of the 4 mg dose for ropinirole. After a week or 2 the Mirapex dose can be increased to 0.5 mg if need be, the maximum dose.   PRINCIPAL DISCHARGE DIAGNOSIS  Diagnosis: Atrial fibrillation  Assessment and Plan of Treatment: Atrial fibrillation is the most common heart rhythm problem.  The condition puts you at risk for has stroke and heart attack  It helps if you control your blood pressure, not drink more than 1-2 alcohol drinks per day, cut down on caffeine, getting treatment for over active thyroid gland, and get regular exercise  Call your doctor if you feel your heart racing or beating unusually, chest tightness or pain, lightheaded, faint, shortness of breath especially with exercise  It is important to take your heart medication as prescribed  You may be on anticoagulation which is very important to take as directed - you may need blood work to monitor drug levels        SECONDARY DISCHARGE DIAGNOSES  Diagnosis: Abnormal nuclear stress test  Assessment and Plan of Treatment: few episodes of WCT, s/p nuclear stress test, abnormal.    Diagnosis: History of melanoma excision  Assessment and Plan of Treatment: s/p melanoma removal.  please follow up with your dermatologist. PRINCIPAL DISCHARGE DIAGNOSIS  Diagnosis: Atrial fibrillation  Assessment and Plan of Treatment: Atrial fibrillation is the most common heart rhythm problem.  The condition puts you at risk for has stroke and heart attack  It helps if you control your blood pressure, not drink more than 1-2 alcohol drinks per day, cut down on caffeine, getting treatment for over active thyroid gland, and get regular exercise  Call your doctor if you feel your heart racing or beating unusually, chest tightness or pain, lightheaded, faint, shortness of breath especially with exercise  It is important to take your heart medication as prescribed  You may be on anticoagulation which is very important to take as directed - you may need blood work to monitor drug levels        SECONDARY DISCHARGE DIAGNOSES  Diagnosis: Abnormal nuclear stress test  Assessment and Plan of Treatment: few episodes of WCT, s/p nuclear stress test, abnormal.  please follow up with Dr. Ferrara for further interventions. possible cardiac cath.    Diagnosis: History of melanoma excision  Assessment and Plan of Treatment: s/p melanoma removal.  please follow up with your dermatologist. PRINCIPAL DISCHARGE DIAGNOSIS  Diagnosis: Atrial fibrillation  Assessment and Plan of Treatment: Atrial fibrillation is the most common heart rhythm problem.  The condition puts you at risk for has stroke and heart attack  It helps if you control your blood pressure, not drink more than 1-2 alcohol drinks per day, cut down on caffeine, getting treatment for over active thyroid gland, and get regular exercise  Call your doctor if you feel your heart racing or beating unusually, chest tightness or pain, lightheaded, faint, shortness of breath especially with exercise  It is important to take your heart medication as prescribed  You may be on anticoagulation which is very important to take as directed - you may need blood work to monitor drug levels        SECONDARY DISCHARGE DIAGNOSES  Diagnosis: Hypertension  Assessment and Plan of Treatment: Low salt diet  Activity as tolerated.  Take all medication as prescribed.  Follow up with your medical doctor for routine blood pressure monitoring at your next visit.  Notify your doctor if you have any of the following symptoms:   Dizziness, Lightheadedness, Blurry vision, Headache, Chest pain, Shortness of breath      Diagnosis: Abnormal nuclear stress test  Assessment and Plan of Treatment: few episodes of WCT, s/p nuclear stress test, abnormal.  please follow up with Dr. Ferrara for further interventions. possible cardiac cath.    Diagnosis: History of melanoma excision  Assessment and Plan of Treatment: s/p melanoma removal.  please follow up with your dermatologist.

## 2019-03-18 NOTE — PROGRESS NOTE ADULT - ASSESSMENT
pt  with h/o  htn.  hld.  melanoma  of   ear lobe/  prior  excision     and   today, had  excision of  melanoma, of  left  ear pinna    tele/  afib   echo , normal ef/  normal la      lopressor  bid/   eliquis bid/   ,   cleared  by  dr martinez   for a/c   electrical  cardioversion per  card ,   on 3/ 18  still in afib,/ on eliquis  positive stress test/  cath at a  later  date      < from: Nuclear Stress Test-Pharmacologic (03.17.19 @ 11:35) >   There is a small, mild defect in the mid to distal  infero-lateral wall that is reversible consistent with  ischemia.  * Post-stress gated wall motion analysis was performed  (LVEF = 59 %;LVEDV = 104 ml.), revealing mild hypokinesis  in distal inferolateral wall(s).  Conclusion:  There is a small, mild defect in the mid to distal  infero-lateral wall that is reversible consistent with  ischemia.  < end of copied text >

## 2019-03-19 ENCOUNTER — TRANSCRIPTION ENCOUNTER (OUTPATIENT)
Age: 69
End: 2019-03-19

## 2019-03-19 VITALS
RESPIRATION RATE: 18 BRPM | DIASTOLIC BLOOD PRESSURE: 71 MMHG | SYSTOLIC BLOOD PRESSURE: 120 MMHG | HEART RATE: 56 BPM | TEMPERATURE: 98 F | OXYGEN SATURATION: 97 %

## 2019-03-19 LAB
ANION GAP SERPL CALC-SCNC: 11 MMOL/L — SIGNIFICANT CHANGE UP (ref 5–17)
BUN SERPL-MCNC: 28 MG/DL — HIGH (ref 7–23)
CALCIUM SERPL-MCNC: 9.4 MG/DL — SIGNIFICANT CHANGE UP (ref 8.4–10.5)
CHLORIDE SERPL-SCNC: 104 MMOL/L — SIGNIFICANT CHANGE UP (ref 96–108)
CO2 SERPL-SCNC: 23 MMOL/L — SIGNIFICANT CHANGE UP (ref 22–31)
CREAT SERPL-MCNC: 1.33 MG/DL — HIGH (ref 0.5–1.3)
GLUCOSE SERPL-MCNC: 96 MG/DL — SIGNIFICANT CHANGE UP (ref 70–99)
POTASSIUM SERPL-MCNC: 4.4 MMOL/L — SIGNIFICANT CHANGE UP (ref 3.5–5.3)
POTASSIUM SERPL-SCNC: 4.4 MMOL/L — SIGNIFICANT CHANGE UP (ref 3.5–5.3)
SODIUM SERPL-SCNC: 138 MMOL/L — SIGNIFICANT CHANGE UP (ref 135–145)
SURGICAL PATHOLOGY STUDY: SIGNIFICANT CHANGE UP

## 2019-03-19 PROCEDURE — 86803 HEPATITIS C AB TEST: CPT

## 2019-03-19 PROCEDURE — 80053 COMPREHEN METABOLIC PANEL: CPT

## 2019-03-19 PROCEDURE — 83735 ASSAY OF MAGNESIUM: CPT

## 2019-03-19 PROCEDURE — C8929: CPT

## 2019-03-19 PROCEDURE — 93312 ECHO TRANSESOPHAGEAL: CPT

## 2019-03-19 PROCEDURE — 93017 CV STRESS TEST TRACING ONLY: CPT

## 2019-03-19 PROCEDURE — 71046 X-RAY EXAM CHEST 2 VIEWS: CPT

## 2019-03-19 PROCEDURE — 88305 TISSUE EXAM BY PATHOLOGIST: CPT

## 2019-03-19 PROCEDURE — 93320 DOPPLER ECHO COMPLETE: CPT

## 2019-03-19 PROCEDURE — 93325 DOPPLER ECHO COLOR FLOW MAPG: CPT

## 2019-03-19 PROCEDURE — 78452 HT MUSCLE IMAGE SPECT MULT: CPT

## 2019-03-19 PROCEDURE — 99285 EMERGENCY DEPT VISIT HI MDM: CPT

## 2019-03-19 PROCEDURE — 85610 PROTHROMBIN TIME: CPT

## 2019-03-19 PROCEDURE — 84443 ASSAY THYROID STIM HORMONE: CPT

## 2019-03-19 PROCEDURE — A9500: CPT

## 2019-03-19 PROCEDURE — 80048 BASIC METABOLIC PNL TOTAL CA: CPT

## 2019-03-19 PROCEDURE — 85027 COMPLETE CBC AUTOMATED: CPT

## 2019-03-19 PROCEDURE — 84484 ASSAY OF TROPONIN QUANT: CPT

## 2019-03-19 PROCEDURE — 85730 THROMBOPLASTIN TIME PARTIAL: CPT

## 2019-03-19 PROCEDURE — 84100 ASSAY OF PHOSPHORUS: CPT

## 2019-03-19 PROCEDURE — 93005 ELECTROCARDIOGRAM TRACING: CPT

## 2019-03-19 RX ORDER — SIMVASTATIN 20 MG/1
1 TABLET, FILM COATED ORAL
Qty: 30 | Refills: 0
Start: 2019-03-19 | End: 2019-04-17

## 2019-03-19 RX ORDER — LISINOPRIL 2.5 MG/1
1 TABLET ORAL
Qty: 30 | Refills: 0
Start: 2019-03-19 | End: 2019-04-17

## 2019-03-19 RX ORDER — ATENOLOL 25 MG/1
1 TABLET ORAL
Qty: 0 | Refills: 0 | COMMUNITY

## 2019-03-19 RX ORDER — METOPROLOL TARTRATE 50 MG
1 TABLET ORAL
Qty: 30 | Refills: 0
Start: 2019-03-19 | End: 2019-04-17

## 2019-03-19 RX ORDER — SIMVASTATIN 20 MG/1
10 TABLET, FILM COATED ORAL AT BEDTIME
Qty: 0 | Refills: 0 | Status: DISCONTINUED | OUTPATIENT
Start: 2019-03-19 | End: 2019-03-19

## 2019-03-19 RX ADMIN — LISINOPRIL 10 MILLIGRAM(S): 2.5 TABLET ORAL at 05:00

## 2019-03-19 RX ADMIN — Medication 81 MILLIGRAM(S): at 08:43

## 2019-03-19 RX ADMIN — Medication 50 MILLIGRAM(S): at 05:00

## 2019-03-19 RX ADMIN — APIXABAN 5 MILLIGRAM(S): 2.5 TABLET, FILM COATED ORAL at 05:00

## 2019-03-19 NOTE — PROGRESS NOTE ADULT - SUBJECTIVE AND OBJECTIVE BOX
tele, nsr    REVIEW OF SYSTEMS:  GEN: no fever,    no chills  RESP: no SOB,   no cough  CVS: no chest pain,   no palpitations  GI: no abdominal pain,   no nausea,   no vomiting,   no constipation,   no diarrhea  : no dysuria,   no frequency  NEURO: no headache,   no dizziness  PSYCH: no depression,   not anxious  Derm : no rash    MEDICATIONS  (STANDING):  apixaban 5 milliGRAM(s) Oral every 12 hours  aspirin enteric coated 81 milliGRAM(s) Oral daily  lisinopril 10 milliGRAM(s) Oral daily  metoprolol tartrate 50 milliGRAM(s) Oral two times a day    MEDICATIONS  (PRN):  oxyCODONE    5 mG/acetaminophen 325 mG 1 Tablet(s) Oral every 4 hours PRN Moderate Pain (4 - 6)      Vital Signs Last 24 Hrs  T(C): 36.7 (19 Mar 2019 04:49), Max: 37 (18 Mar 2019 11:30)  T(F): 98 (19 Mar 2019 04:49), Max: 98.6 (18 Mar 2019 11:30)  HR: 63 (19 Mar 2019 04:49) (60 - 87)  BP: 137/80 (19 Mar 2019 04:49) (105/64 - 161/97)  BP(mean): --  RR: 18 (19 Mar 2019 04:49) (18 - 18)  SpO2: 98% (19 Mar 2019 04:49) (96% - 98%)  CAPILLARY BLOOD GLUCOSE        I&O's Summary    18 Mar 2019 07:01  -  19 Mar 2019 07:00  --------------------------------------------------------  IN: 440 mL / OUT: 0 mL / NET: 440 mL        PHYSICAL EXAM:  HEAD:  Atraumatic, Normocephalic  NECK: Supple, No   JVD  CHEST/LUNG:   no     rales,     no,    rhonchi  HEART: Regular rate and rhythm;         murmur  ABDOMEN: Soft, Nontender, ;   EXTREMITIES:    no    edema  NEUROLOGY:  alert    LABS:                        15.3   5.37  )-----------( 172      ( 17 Mar 2019 08:54 )             46.1     03-19    138  |  104  |  28<H>  ----------------------------<  96  4.4   |  23  |  1.33<H>    Ca    9.4      19 Mar 2019 06:06  Mg     2.1     03-18                      Thyroid Stimulating Hormone, Serum: 2.79 uIU/mL (03-16 @ 13:48)          Consultant(s) Notes Reviewed:      Care Discussed with Consultants/Other Providers:

## 2019-03-19 NOTE — PROVIDER CONTACT NOTE (OTHER) - ASSESSMENT
Pt is A&Ox4 denies chest pain, dizziness, palpitations, SOB. temp 97.6F oral, HR 56, BP 97/57, RR 18, SpO2 97% on room air. Pts baseline SBP during this admission ranges 130-160s

## 2019-03-19 NOTE — PROGRESS NOTE ADULT - SUBJECTIVE AND OBJECTIVE BOX
CARDIOLOGY     PROGRESS  NOTE   ________________________________________________    CHIEF COMPLAINT:Patient is a 68y old  Male who presents with a chief complaint of AF (19 Mar 2019 08:18)    	  REVIEW OF SYSTEMS:  CONSTITUTIONAL: No fever, weight loss, or fatigue  EYES: No eye pain, visual disturbances, or discharge  ENT:  No difficulty hearing, tinnitus, vertigo; No sinus or throat pain  NECK: No pain or stiffness  RESPIRATORY: No cough, wheezing, chills or hemoptysis; No Shortness of Breath  CARDIOVASCULAR: No chest pain, palpitations, passing out, dizziness, or leg swelling  GASTROINTESTINAL: No abdominal or epigastric pain. No nausea, vomiting, or hematemesis; No diarrhea or constipation. No melena or hematochezia.  GENITOURINARY: No dysuria, frequency, hematuria, or incontinence  NEUROLOGICAL: No headaches, memory loss, loss of strength, numbness, or tremors  SKIN: No itching, burning, rashes, or lesions   LYMPH Nodes: No enlarged glands  ENDOCRINE: No heat or cold intolerance; No hair loss  MUSCULOSKELETAL: No joint pain or swelling; No muscle, back, or extremity pain  PSYCHIATRIC: No depression, anxiety, mood swings, or difficulty sleeping  HEME/LYMPH: No easy bruising, or bleeding gums  ALLERGY AND IMMUNOLOGIC: No hives or eczema	    [ ] All others negative	  [ ] Unable to obtain    PHYSICAL EXAM:  T(C): 36.7 (03-19-19 @ 04:49), Max: 37 (03-18-19 @ 11:30)  HR: 63 (03-19-19 @ 04:49) (60 - 87)  BP: 137/80 (03-19-19 @ 04:49) (105/64 - 161/97)  RR: 18 (03-19-19 @ 04:49) (18 - 18)  SpO2: 98% (03-19-19 @ 04:49) (96% - 98%)  Wt(kg): --  I&O's Summary    18 Mar 2019 07:01  -  19 Mar 2019 07:00  --------------------------------------------------------  IN: 440 mL / OUT: 0 mL / NET: 440 mL        Appearance: Normal	  HEENT:   Normal oral mucosa, PERRL, EOMI	  Lymphatic: No lymphadenopathy  Cardiovascular: Normal S1 S2, No JVD, No murmurs, No edema  Respiratory: Lungs clear to auscultation	  Psychiatry: A & O x 3, Mood & affect appropriate  Gastrointestinal:  Soft, Non-tender, + BS	  Skin: No rashes, No ecchymoses, No cyanosis	  Neurologic: Non-focal  Extremities: Normal range of motion, No clubbing, cyanosis or edema  Vascular: Peripheral pulses palpable 2+ bilaterally    MEDICATIONS  (STANDING):  apixaban 5 milliGRAM(s) Oral every 12 hours  aspirin enteric coated 81 milliGRAM(s) Oral daily  lisinopril 10 milliGRAM(s) Oral daily  metoprolol tartrate 50 milliGRAM(s) Oral two times a day  simvastatin 10 milliGRAM(s) Oral at bedtime      TELEMETRY: 	    ECG:  	  RADIOLOGY:  OTHER: 	  	  LABS:	 	    CARDIAC MARKERS:                                15.3   5.37  )-----------( 172      ( 17 Mar 2019 08:54 )             46.1     03-19    138  |  104  |  28<H>  ----------------------------<  96  4.4   |  23  |  1.33<H>    Ca    9.4      19 Mar 2019 06:06  Mg     2.1     03-18      proBNP:   Lipid Profile:   HgA1c:   TSH: Thyroid Stimulating Hormone, Serum: 2.79 uIU/mL (03-16 @ 13:48)  Thyroid Stimulating Hormone, Serum: 0.59 uIU/mL (03-15 @ 14:34)          Assessment and plan  ---------------------------  s/p cardioversion  remain in nsr  dc on beta blocker/ac  fu with me in 3 weeks

## 2019-03-19 NOTE — DISCHARGE NOTE NURSING/CASE MANAGEMENT/SOCIAL WORK - NSDCDPATPORTLINK_GEN_ALL_CORE
You can access the [facility] Patient Portal, offered by [facility], by registering with the following website: [email or web address]

## 2019-03-19 NOTE — PROGRESS NOTE ADULT - ASSESSMENT
pt  with h/o  htn.  hld.  melanoma  of   ear lobe/  prior  excision     and   today, had  excision of  melanoma, of  left  ear pinna    tele/  afib   echo , normal ef/  normal la      lopressor  bid/   eliquis bid/   ,   cleared  by  dr martinez   for a/c   electrical  cardioversion per  card ,   on 3/ 18  still in afib,/ on eliquis  positive stress test/  cath at a  later  date  s/p cardioversion now in nsr    d/c today    f/p pmd, 1  week      < from: Nuclear Stress Test-Pharmacologic (03.17.19 @ 11:35) >   There is a small, mild defect in the mid to distal  infero-lateral wall that is reversible consistent with  ischemia.  * Post-stress gated wall motion analysis was performed  (LVEF = 59 %;LVEDV = 104 ml.), revealing mild hypokinesis  in distal inferolateral wall(s).  Conclusion:  There is a small, mild defect in the mid to distal  infero-lateral wall that is reversible consistent with  ischemia.  < end of copied text >

## 2019-03-28 ENCOUNTER — APPOINTMENT (OUTPATIENT)
Dept: PLASTIC SURGERY | Facility: CLINIC | Age: 69
End: 2019-03-28
Payer: MEDICARE

## 2019-03-28 DIAGNOSIS — D03.20: ICD-10-CM

## 2019-03-28 PROCEDURE — 99024 POSTOP FOLLOW-UP VISIT: CPT

## 2019-03-28 RX ORDER — SILVER SULFADIAZINE 10 G/1000G
1 CREAM TOPICAL
Qty: 85 | Refills: 0 | Status: ACTIVE | COMMUNITY
Start: 2019-03-04

## 2019-03-28 NOTE — HISTORY OF PRESENT ILLNESS
[FreeTextEntry1] : 67 yo M who presents for post op visit s/p local flap reconstruction of left earlobe following excision of melanoma DOS: 03/14/19. Pt is doing well, he denies pain or discomfort. He is here for suture removal. He applied Bacitracin on the area. Otherwise, no other complaints or concerns; denies fever, sweats, or chills.

## 2019-03-28 NOTE — PHYSICAL EXAM
[de-identified] : Left Earlobe -\par local flap taking well, incisions are CDI, no erythema, no gaps, no drainage noted. No sign of active infection.

## 2019-03-28 NOTE — ADDENDUM
[FreeTextEntry1] : All medical record entries made were at my, MATEUS ANNE MD, direction and personally dictated by me. I have reviewed the chart and agree that the record accurately reflects my personal performance of the history, physical exam, assessment, and plan.

## 2019-03-28 NOTE — ASSESSMENT
[FreeTextEntry1] : 67 yo M who presents for post op visit s/p local flap reconstruction of left earlobe following excision of melanoma DOS: 03/14/19.\par \par Pt doing well, incisions are healing well.\par - All sutures were removed from left earlobe\par - The patient was encouraged to massage the incision site 2-3 times per day for 8-10 minutes with lotion, vitamin E, or cocoa butter to encourage blood flow and to decrease scar tissue formation.\par - Sun protection recommended\par - Follow up regularly w/ Dermatology\par - Follow up PRN.

## 2019-04-15 ENCOUNTER — OUTPATIENT (OUTPATIENT)
Dept: OUTPATIENT SERVICES | Facility: HOSPITAL | Age: 69
LOS: 1 days | Discharge: ANOTHER HOSPITAL | End: 2019-04-15

## 2019-04-15 DIAGNOSIS — Z85.828 PERSONAL HISTORY OF OTHER MALIGNANT NEOPLASM OF SKIN: Chronic | ICD-10-CM

## 2019-04-15 DIAGNOSIS — L97.801 NON-PRESSURE CHRONIC ULCER OF OTHER PART OF UNSPECIFIED LOWER LEG LIMITED TO BREAKDOWN OF SKIN: ICD-10-CM

## 2019-04-16 ENCOUNTER — TRANSCRIPTION ENCOUNTER (OUTPATIENT)
Age: 69
End: 2019-04-16

## 2019-04-16 ENCOUNTER — INPATIENT (INPATIENT)
Facility: HOSPITAL | Age: 69
LOS: 2 days | Discharge: HOME | DRG: 504 | End: 2019-04-19
Attending: INTERNAL MEDICINE | Admitting: HOSPITALIST
Payer: MEDICARE

## 2019-04-16 VITALS
OXYGEN SATURATION: 98 % | SYSTOLIC BLOOD PRESSURE: 130 MMHG | RESPIRATION RATE: 14 BRPM | DIASTOLIC BLOOD PRESSURE: 68 MMHG | TEMPERATURE: 98 F | HEART RATE: 56 BPM | WEIGHT: 235.01 LBS | HEIGHT: 75 IN

## 2019-04-16 DIAGNOSIS — L08.9 LOCAL INFECTION OF THE SKIN AND SUBCUTANEOUS TISSUE, UNSPECIFIED: ICD-10-CM

## 2019-04-16 DIAGNOSIS — Z29.9 ENCOUNTER FOR PROPHYLACTIC MEASURES, UNSPECIFIED: ICD-10-CM

## 2019-04-16 DIAGNOSIS — I48.91 UNSPECIFIED ATRIAL FIBRILLATION: ICD-10-CM

## 2019-04-16 DIAGNOSIS — Z85.828 PERSONAL HISTORY OF OTHER MALIGNANT NEOPLASM OF SKIN: Chronic | ICD-10-CM

## 2019-04-16 DIAGNOSIS — I10 ESSENTIAL (PRIMARY) HYPERTENSION: ICD-10-CM

## 2019-04-16 LAB
ALBUMIN SERPL ELPH-MCNC: 3.7 G/DL — SIGNIFICANT CHANGE UP (ref 3.3–5)
ALP SERPL-CCNC: 70 U/L — SIGNIFICANT CHANGE UP (ref 40–120)
ALT FLD-CCNC: 32 U/L — SIGNIFICANT CHANGE UP (ref 12–78)
ANION GAP SERPL CALC-SCNC: 6 MMOL/L — SIGNIFICANT CHANGE UP (ref 5–17)
APTT BLD: 38.7 SEC — HIGH (ref 28.5–37)
AST SERPL-CCNC: 24 U/L — SIGNIFICANT CHANGE UP (ref 15–37)
BASOPHILS # BLD AUTO: 0.06 K/UL — SIGNIFICANT CHANGE UP (ref 0–0.2)
BASOPHILS NFR BLD AUTO: 0.9 % — SIGNIFICANT CHANGE UP (ref 0–2)
BILIRUB SERPL-MCNC: 0.7 MG/DL — SIGNIFICANT CHANGE UP (ref 0.2–1.2)
BLD GP AB SCN SERPL QL: SIGNIFICANT CHANGE UP
BUN SERPL-MCNC: 17 MG/DL — SIGNIFICANT CHANGE UP (ref 7–23)
CALCIUM SERPL-MCNC: 8.8 MG/DL — SIGNIFICANT CHANGE UP (ref 8.5–10.1)
CHLORIDE SERPL-SCNC: 110 MMOL/L — HIGH (ref 96–108)
CO2 SERPL-SCNC: 24 MMOL/L — SIGNIFICANT CHANGE UP (ref 22–31)
CREAT SERPL-MCNC: 1.2 MG/DL — SIGNIFICANT CHANGE UP (ref 0.5–1.3)
EOSINOPHIL # BLD AUTO: 0.28 K/UL — SIGNIFICANT CHANGE UP (ref 0–0.5)
EOSINOPHIL NFR BLD AUTO: 4.3 % — SIGNIFICANT CHANGE UP (ref 0–6)
ERYTHROCYTE [SEDIMENTATION RATE] IN BLOOD: 2 MM/HR — SIGNIFICANT CHANGE UP (ref 0–20)
GLUCOSE SERPL-MCNC: 92 MG/DL — SIGNIFICANT CHANGE UP (ref 70–99)
HCT VFR BLD CALC: 49 % — SIGNIFICANT CHANGE UP (ref 39–50)
HGB BLD-MCNC: 16.3 G/DL — SIGNIFICANT CHANGE UP (ref 13–17)
IMM GRANULOCYTES NFR BLD AUTO: 0.2 % — SIGNIFICANT CHANGE UP (ref 0–1.5)
INR BLD: 1.24 RATIO — HIGH (ref 0.88–1.16)
LACTATE SERPL-SCNC: 0.9 MMOL/L — SIGNIFICANT CHANGE UP (ref 0.7–2)
LYMPHOCYTES # BLD AUTO: 1.96 K/UL — SIGNIFICANT CHANGE UP (ref 1–3.3)
LYMPHOCYTES # BLD AUTO: 30.3 % — SIGNIFICANT CHANGE UP (ref 13–44)
MCHC RBC-ENTMCNC: 29.9 PG — SIGNIFICANT CHANGE UP (ref 27–34)
MCHC RBC-ENTMCNC: 33.3 GM/DL — SIGNIFICANT CHANGE UP (ref 32–36)
MCV RBC AUTO: 89.7 FL — SIGNIFICANT CHANGE UP (ref 80–100)
MONOCYTES # BLD AUTO: 0.93 K/UL — HIGH (ref 0–0.9)
MONOCYTES NFR BLD AUTO: 14.4 % — HIGH (ref 2–14)
NEUTROPHILS # BLD AUTO: 3.23 K/UL — SIGNIFICANT CHANGE UP (ref 1.8–7.4)
NEUTROPHILS NFR BLD AUTO: 49.9 % — SIGNIFICANT CHANGE UP (ref 43–77)
NRBC # BLD: 0 /100 WBCS — SIGNIFICANT CHANGE UP (ref 0–0)
PLATELET # BLD AUTO: 174 K/UL — SIGNIFICANT CHANGE UP (ref 150–400)
POTASSIUM SERPL-MCNC: 4.4 MMOL/L — SIGNIFICANT CHANGE UP (ref 3.5–5.3)
POTASSIUM SERPL-SCNC: 4.4 MMOL/L — SIGNIFICANT CHANGE UP (ref 3.5–5.3)
PREALB SERPL-MCNC: 27.9 MG/DL — SIGNIFICANT CHANGE UP (ref 20–40)
PROT SERPL-MCNC: 7.2 G/DL — SIGNIFICANT CHANGE UP (ref 6–8.3)
PROTHROM AB SERPL-ACNC: 14.2 SEC — HIGH (ref 10–12.9)
RBC # BLD: 5.46 M/UL — SIGNIFICANT CHANGE UP (ref 4.2–5.8)
RBC # FLD: 13.2 % — SIGNIFICANT CHANGE UP (ref 10.3–14.5)
SODIUM SERPL-SCNC: 140 MMOL/L — SIGNIFICANT CHANGE UP (ref 135–145)
WBC # BLD: 6.47 K/UL — SIGNIFICANT CHANGE UP (ref 3.8–10.5)
WBC # FLD AUTO: 6.47 K/UL — SIGNIFICANT CHANGE UP (ref 3.8–10.5)

## 2019-04-16 PROCEDURE — 93010 ELECTROCARDIOGRAM REPORT: CPT

## 2019-04-16 PROCEDURE — 73630 X-RAY EXAM OF FOOT: CPT | Mod: 26,RT

## 2019-04-16 PROCEDURE — 99223 1ST HOSP IP/OBS HIGH 75: CPT | Mod: AI,GC

## 2019-04-16 PROCEDURE — 71046 X-RAY EXAM CHEST 2 VIEWS: CPT | Mod: 26

## 2019-04-16 PROCEDURE — 73720 MRI LWR EXTREMITY W/O&W/DYE: CPT | Mod: 26,RT

## 2019-04-16 PROCEDURE — 99222 1ST HOSP IP/OBS MODERATE 55: CPT

## 2019-04-16 PROCEDURE — 99284 EMERGENCY DEPT VISIT MOD MDM: CPT

## 2019-04-16 RX ORDER — PIPERACILLIN AND TAZOBACTAM 4; .5 G/20ML; G/20ML
3.38 INJECTION, POWDER, LYOPHILIZED, FOR SOLUTION INTRAVENOUS EVERY 8 HOURS
Qty: 0 | Refills: 0 | Status: DISCONTINUED | OUTPATIENT
Start: 2019-04-16 | End: 2019-04-17

## 2019-04-16 RX ORDER — LISINOPRIL 2.5 MG/1
5 TABLET ORAL DAILY
Qty: 0 | Refills: 0 | Status: DISCONTINUED | OUTPATIENT
Start: 2019-04-16 | End: 2019-04-16

## 2019-04-16 RX ORDER — METOPROLOL TARTRATE 50 MG
100 TABLET ORAL DAILY
Qty: 0 | Refills: 0 | Status: DISCONTINUED | OUTPATIENT
Start: 2019-04-16 | End: 2019-04-17

## 2019-04-16 RX ORDER — CALCIUM POLYCARBOPHIL 625 MG/1
2 TABLET, FILM COATED ORAL
Qty: 0 | Refills: 0 | COMMUNITY

## 2019-04-16 RX ORDER — PIPERACILLIN AND TAZOBACTAM 4; .5 G/20ML; G/20ML
3.38 INJECTION, POWDER, LYOPHILIZED, FOR SOLUTION INTRAVENOUS ONCE
Qty: 0 | Refills: 0 | Status: COMPLETED | OUTPATIENT
Start: 2019-04-16 | End: 2019-04-16

## 2019-04-16 RX ORDER — ASPIRIN/CALCIUM CARB/MAGNESIUM 324 MG
1 TABLET ORAL
Qty: 0 | Refills: 0 | COMMUNITY

## 2019-04-16 RX ORDER — SODIUM CHLORIDE 9 MG/ML
1000 INJECTION INTRAMUSCULAR; INTRAVENOUS; SUBCUTANEOUS ONCE
Qty: 0 | Refills: 0 | Status: COMPLETED | OUTPATIENT
Start: 2019-04-16 | End: 2019-04-16

## 2019-04-16 RX ORDER — SIMVASTATIN 20 MG/1
10 TABLET, FILM COATED ORAL AT BEDTIME
Qty: 0 | Refills: 0 | Status: DISCONTINUED | OUTPATIENT
Start: 2019-04-16 | End: 2019-04-17

## 2019-04-16 RX ORDER — ERGOCALCIFEROL 1.25 MG/1
1 CAPSULE ORAL
Qty: 0 | Refills: 0 | COMMUNITY

## 2019-04-16 RX ORDER — LISINOPRIL 2.5 MG/1
5 TABLET ORAL DAILY
Qty: 0 | Refills: 0 | Status: DISCONTINUED | OUTPATIENT
Start: 2019-04-16 | End: 2019-04-17

## 2019-04-16 RX ADMIN — SODIUM CHLORIDE 1000 MILLILITER(S): 9 INJECTION INTRAMUSCULAR; INTRAVENOUS; SUBCUTANEOUS at 11:00

## 2019-04-16 RX ADMIN — PIPERACILLIN AND TAZOBACTAM 25 GRAM(S): 4; .5 INJECTION, POWDER, LYOPHILIZED, FOR SOLUTION INTRAVENOUS at 23:28

## 2019-04-16 RX ADMIN — SODIUM CHLORIDE 1000 MILLILITER(S): 9 INJECTION INTRAMUSCULAR; INTRAVENOUS; SUBCUTANEOUS at 10:00

## 2019-04-16 RX ADMIN — SIMVASTATIN 10 MILLIGRAM(S): 20 TABLET, FILM COATED ORAL at 23:28

## 2019-04-16 RX ADMIN — PIPERACILLIN AND TAZOBACTAM 200 GRAM(S): 4; .5 INJECTION, POWDER, LYOPHILIZED, FOR SOLUTION INTRAVENOUS at 11:39

## 2019-04-16 NOTE — ED PROVIDER NOTE - OBJECTIVE STATEMENT
67 yo M P/w R foot wound x past ~ 1 month, not healing. Now pt sent to Ed for admission for probable surg to R foot by Dr Mcrae. No redness / swelling. no fever/chills. no weakness / dizzy. no agg/allev factors. no other inj or co.

## 2019-04-16 NOTE — H&P ADULT - PROBLEM SELECTOR PLAN 1
-Sent to ED by Dr. Mcrae for possible surgery tomorrow AM, +R foot plantar surface wound, 3rd metatarsal head   -Xray R foot:  Prior amputations. No acute finding on standard film.  -Pending MRI foot, f/u results   -Podiatry Dr. Mcrae following   -NPO after midnight for possible surgery   -WIll hold Eliquis for now, for possible surgery   -Cardio ([facility]) consulted for cardiac clearance for surgery   -Patient is medically optimized for intermediate risk surgery pending cardiac clearance. -Sent to ED by Dr. Mcrae for possible surgery tomorrow AM, +R foot plantar surface wound, 3rd metatarsal head   -Xray R foot:  Prior amputations. No acute finding on standard film.  -Pending MRI foot, f/u results   -s/p zosyn in ED, will continue   -Podiatry Dr. Mcrae following   -NPO after midnight for possible surgery   -WIll hold Eliquis for now, for possible surgery   -Cardio ([facility]) consulted for cardiac clearance for surgery   -Patient is medically optimized for intermediate risk surgery pending cardiac clearance. -Sent to ED by Dr. Mcrae for possible surgery tomorrow AM, +R foot plantar surface wound, 3rd metatarsal head   -Xray R foot:  Prior amputations. No acute finding on standard film.  -Pending MRI foot, f/u results   -s/p zosyn in ED, will continue   -Podiatry Dr. Mcrae following, f/u recs  -NPO after midnight for possible surgery   -Will hold Eliquis for now, for possible surgery   -Cardio ([facility]) consulted for cardiac clearance for surgery   -Patient is medically optimized for intermediate risk surgery pending cardiac clearance.

## 2019-04-16 NOTE — H&P ADULT - NSHPREVIEWOFSYSTEMS_GEN_ALL_CORE
CONSTITUTIONAL: No weakness, fevers or chills  EYES/ENT: No visual changes  RESPIRATORY: No cough, wheezing, hemoptysis; No shortness of breath  CARDIOVASCULAR: No chest pain or palpitations  GASTROINTESTINAL: No abdominal or epigastric pain. No nausea, vomiting, or hematemesis; No diarrhea or constipation. No melena or hematochezia.  GENITOURINARY: No dysuria, frequency or hematuria  NEUROLOGICAL: No numbness or weakness  SKIN: + R plantar surface wound   All other review of systems is negative unless indicated above.

## 2019-04-16 NOTE — CONSULT NOTE ADULT - ASSESSMENT
68m recent dx of first episode of.  After resection of a melanoma, in the rr he was noted to be in asymptomatic AF.  He was anticoagulated, rate controlled.  Echo unremarkable.  Noted to have brief wct and so had a nuclear stress, which revealed a small area of inferolateral ischemia, managed conservatively.  YANICK DCCV was performed, and he was dc in sr.  He was seen in the office afterward and was told to return in 3 months.    He now comes because of concerns about osteo of the plantar surface of the right foot.  He is planned for debridement.  Preop consultation is being obtained.    He reports being active at baseline, without sxs of angina, heart failure or arrhythmia.    -there is no evidence of acute ischemia. Small area of ischemia noted on recent nuc stress  -there is no evidence of significant arrhythmia.  -there is no evidence for meaningful  volume overload. Normal lvef.  -he remains in sr following the dccv  -has remained on AC, which can be held (took the dose this AM)  -ac should be resumed when the risk of bleeding has diminished post op  -cont lisinopril  -cont metoprolol  -cont statin  -he is optimized from a cardiovascular perspective for his planned low risk podiatric procedure.  Routine hemodynamic monitoring is recommended.  -DVT prophylaxis  -monitor electrolytes, keep k>4, Mg>2  -will follow

## 2019-04-16 NOTE — H&P ADULT - PROBLEM SELECTOR PLAN 4
-Home medication of Eliquis**, holding tonight for possible surgery tomorrow   -SCDs for now     BB IMPROVE VTE Individual Risk Assessment          RISK                                                          Points    [  ] Previous VTE                                                3  [  ] Thrombophilia                                             2  [  ] Lower limb paralysis                                   2        (unable to hold up >15 seconds)    [  ] Current Cancer                                            2         (within 6 months)  [  ] Immobilization > 24 hrs                              1  [  ] ICU/CCU stay > 24 hours                            1  [ 1 ] Age > 60                                                    1    IMPROVE VTE Score _________

## 2019-04-16 NOTE — H&P ADULT - NSHPPHYSICALEXAM_GEN_ALL_CORE
General: Well developed, well nourished, NAD  HEENT: NCAT, PERRL,  moist mucous membranes   Neurology: A&Ox3, 5/5 mm strength b/l UE and LE, decreased sensation of b/l feet   Respiratory: CTA B/L, No W/R/R  CV: RRR, +S1/S2, no murmurs, rubs or gallops  Abdominal: Soft, NT, ND +BS  Extremities: Left foot s/p 2nd digit amputation, +R plantar surface deep wound with discharge, no swelling

## 2019-04-16 NOTE — CONSULT NOTE ADULT - ATTENDING COMMENTS
Patient seen and evaluated with Dr pedro   Wound flushed with NS and dressed with DSD   Surgical procedure right foot noel debridment via cutting of skin soft tissue and bone as deemed necessary   explained risk, benefits, and complications   No guarantees given implied or promises made   pt verbalized understanding and provided informed written consent that was signed witnessed and placed in  chart   Please provide medical/Cardio clearance for OR procedure 4/17/19  Please ensure patient is NPO for procedure

## 2019-04-16 NOTE — H&P ADULT - HISTORY OF PRESENT ILLNESS
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 Mcrae for probable surgery 2/2 to R plantar surface wound for possible osteomyelitis. Patient states that he first noticed the wound about 2 months earlier, was seeing podiatrist at [facility], admits to clear discharge and blood from wound on occasion for past two months, patient was referred to Dr. Mcrae yesterday, and consequently sent to the ED for probable surgery. Denies any known trauma, swelling, fevers, chills, chest pain, SOB, pain.      ED vitals: 97.8, HR: 56, BP: 130/68, RR: 14, 98 on RA   Labs: WBC and lactate wnl, INR: 1.24, Type and Screen was done, BCx drawn, ESR pending   in the ED patient was given 1 dose zosyn, IVF bolus   An MRI was ordered in the ED

## 2019-04-16 NOTE — H&P ADULT - PROBLEM SELECTOR PLAN 2
-Recently diagnosis of Afib at [facility]   -Hold Eliquis for today, for possible surgery tomorrow AM, on SCDs   -Continue home medication metoprolol with hold parameters   -Continue home mediation simvastatin

## 2019-04-16 NOTE — CONSULT NOTE ADULT - ASSESSMENT
Patient is a 68y old  Male who presents with a chief complaint of right foot infected DFU with underlying osteomyelitis (16 Apr 2019 12:41)

## 2019-04-16 NOTE — ED PROVIDER NOTE - PHYSICAL EXAMINATION
R foot, dist plantar aspect , with open wound, min tend. no edema. Nl dist str/sens equal bl, 2+ pulses. nl cap refill.

## 2019-04-16 NOTE — H&P ADULT - ASSESSMENT
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 Mcrae for probable surgery 2/2 to R plantar surface wound for possible osteomyelitis.

## 2019-04-16 NOTE — ED ADULT NURSE NOTE - OBJECTIVE STATEMENT
pt sent in from wound care for admittance for surgery for wound on bottom of right foot. pt unsure how wound happened. wound with some drainage noted on dressing.

## 2019-04-16 NOTE — H&P ADULT - PROBLEM SELECTOR PLAN 3
-Continue home medication lisinopril with hold parameters -Continue home medication lisinopril with hold parameters of  for now

## 2019-04-16 NOTE — ED PROVIDER NOTE - CHPI ED SYMPTOMS NEG
no back pain/no tingling/no fever/no deformity/no numbness/no weakness/no difficulty bearing weight/no abrasion/no bruising

## 2019-04-16 NOTE — H&P ADULT - NSICDXPASTMEDICALHX_GEN_ALL_CORE_FT
PAST MEDICAL HISTORY:  Afib     Arthritis hx of multiple knee surgery    Hypertension     Melanocytic nevi of left ear

## 2019-04-17 ENCOUNTER — RESULT REVIEW (OUTPATIENT)
Age: 69
End: 2019-04-17

## 2019-04-17 DIAGNOSIS — E66.3 OVERWEIGHT: ICD-10-CM

## 2019-04-17 DIAGNOSIS — I10 ESSENTIAL (PRIMARY) HYPERTENSION: ICD-10-CM

## 2019-04-17 DIAGNOSIS — Z87.828 PERSONAL HISTORY OF OTHER (HEALED) PHYSICAL INJURY AND TRAUMA: ICD-10-CM

## 2019-04-17 DIAGNOSIS — M86.671 OTHER CHRONIC OSTEOMYELITIS, RIGHT ANKLE AND FOOT: ICD-10-CM

## 2019-04-17 DIAGNOSIS — I83.93 ASYMPTOMATIC VARICOSE VEINS OF BILATERAL LOWER EXTREMITIES: ICD-10-CM

## 2019-04-17 DIAGNOSIS — L89.893 PRESSURE ULCER OF OTHER SITE, STAGE 3: ICD-10-CM

## 2019-04-17 DIAGNOSIS — L03.115 CELLULITIS OF RIGHT LOWER LIMB: ICD-10-CM

## 2019-04-17 DIAGNOSIS — I48.91 UNSPECIFIED ATRIAL FIBRILLATION: ICD-10-CM

## 2019-04-17 DIAGNOSIS — Z79.899 OTHER LONG TERM (CURRENT) DRUG THERAPY: ICD-10-CM

## 2019-04-17 DIAGNOSIS — G62.9 POLYNEUROPATHY, UNSPECIFIED: ICD-10-CM

## 2019-04-17 DIAGNOSIS — Z89.422 ACQUIRED ABSENCE OF OTHER LEFT TOE(S): ICD-10-CM

## 2019-04-17 DIAGNOSIS — M12.9 ARTHROPATHY, UNSPECIFIED: ICD-10-CM

## 2019-04-17 LAB
ANION GAP SERPL CALC-SCNC: 8 MMOL/L — SIGNIFICANT CHANGE UP (ref 5–17)
BUN SERPL-MCNC: 14 MG/DL — SIGNIFICANT CHANGE UP (ref 7–23)
CALCIUM SERPL-MCNC: 8.8 MG/DL — SIGNIFICANT CHANGE UP (ref 8.5–10.1)
CHLORIDE SERPL-SCNC: 108 MMOL/L — SIGNIFICANT CHANGE UP (ref 96–108)
CO2 SERPL-SCNC: 24 MMOL/L — SIGNIFICANT CHANGE UP (ref 22–31)
CREAT SERPL-MCNC: 1.1 MG/DL — SIGNIFICANT CHANGE UP (ref 0.5–1.3)
CULTURE RESULTS: SIGNIFICANT CHANGE UP
GLUCOSE SERPL-MCNC: 91 MG/DL — SIGNIFICANT CHANGE UP (ref 70–99)
HCT VFR BLD CALC: 45.5 % — SIGNIFICANT CHANGE UP (ref 39–50)
HGB BLD-MCNC: 15.3 G/DL — SIGNIFICANT CHANGE UP (ref 13–17)
MCHC RBC-ENTMCNC: 29.9 PG — SIGNIFICANT CHANGE UP (ref 27–34)
MCHC RBC-ENTMCNC: 33.6 GM/DL — SIGNIFICANT CHANGE UP (ref 32–36)
MCV RBC AUTO: 88.9 FL — SIGNIFICANT CHANGE UP (ref 80–100)
NRBC # BLD: 0 /100 WBCS — SIGNIFICANT CHANGE UP (ref 0–0)
PLATELET # BLD AUTO: 127 K/UL — LOW (ref 150–400)
POTASSIUM SERPL-MCNC: 4 MMOL/L — SIGNIFICANT CHANGE UP (ref 3.5–5.3)
POTASSIUM SERPL-SCNC: 4 MMOL/L — SIGNIFICANT CHANGE UP (ref 3.5–5.3)
RBC # BLD: 5.12 M/UL — SIGNIFICANT CHANGE UP (ref 4.2–5.8)
RBC # FLD: 13 % — SIGNIFICANT CHANGE UP (ref 10.3–14.5)
SODIUM SERPL-SCNC: 140 MMOL/L — SIGNIFICANT CHANGE UP (ref 135–145)
SPECIMEN SOURCE: SIGNIFICANT CHANGE UP
WBC # BLD: 4.81 K/UL — SIGNIFICANT CHANGE UP (ref 3.8–10.5)
WBC # FLD AUTO: 4.81 K/UL — SIGNIFICANT CHANGE UP (ref 3.8–10.5)

## 2019-04-17 PROCEDURE — 99232 SBSQ HOSP IP/OBS MODERATE 35: CPT

## 2019-04-17 PROCEDURE — 88311 DECALCIFY TISSUE: CPT | Mod: 26

## 2019-04-17 PROCEDURE — 73630 X-RAY EXAM OF FOOT: CPT | Mod: 26,RT

## 2019-04-17 PROCEDURE — 99233 SBSQ HOSP IP/OBS HIGH 50: CPT

## 2019-04-17 PROCEDURE — 88304 TISSUE EXAM BY PATHOLOGIST: CPT | Mod: 26

## 2019-04-17 RX ORDER — HYDROMORPHONE HYDROCHLORIDE 2 MG/ML
0.5 INJECTION INTRAMUSCULAR; INTRAVENOUS; SUBCUTANEOUS
Qty: 0 | Refills: 0 | Status: DISCONTINUED | OUTPATIENT
Start: 2019-04-17 | End: 2019-04-17

## 2019-04-17 RX ORDER — PIPERACILLIN AND TAZOBACTAM 4; .5 G/20ML; G/20ML
3.38 INJECTION, POWDER, LYOPHILIZED, FOR SOLUTION INTRAVENOUS EVERY 8 HOURS
Qty: 0 | Refills: 0 | Status: DISCONTINUED | OUTPATIENT
Start: 2019-04-17 | End: 2019-04-18

## 2019-04-17 RX ORDER — OXYCODONE AND ACETAMINOPHEN 5; 325 MG/1; MG/1
1 TABLET ORAL EVERY 6 HOURS
Qty: 0 | Refills: 0 | Status: DISCONTINUED | OUTPATIENT
Start: 2019-04-17 | End: 2019-04-19

## 2019-04-17 RX ORDER — ACETAMINOPHEN 500 MG
650 TABLET ORAL EVERY 6 HOURS
Qty: 0 | Refills: 0 | Status: DISCONTINUED | OUTPATIENT
Start: 2019-04-17 | End: 2019-04-19

## 2019-04-17 RX ORDER — SODIUM CHLORIDE 9 MG/ML
1000 INJECTION, SOLUTION INTRAVENOUS
Qty: 0 | Refills: 0 | Status: DISCONTINUED | OUTPATIENT
Start: 2019-04-17 | End: 2019-04-17

## 2019-04-17 RX ORDER — SIMVASTATIN 20 MG/1
10 TABLET, FILM COATED ORAL AT BEDTIME
Qty: 0 | Refills: 0 | Status: DISCONTINUED | OUTPATIENT
Start: 2019-04-17 | End: 2019-04-19

## 2019-04-17 RX ORDER — ONDANSETRON 8 MG/1
4 TABLET, FILM COATED ORAL ONCE
Qty: 0 | Refills: 0 | Status: DISCONTINUED | OUTPATIENT
Start: 2019-04-17 | End: 2019-04-17

## 2019-04-17 RX ORDER — METOPROLOL TARTRATE 50 MG
100 TABLET ORAL DAILY
Qty: 0 | Refills: 0 | Status: DISCONTINUED | OUTPATIENT
Start: 2019-04-17 | End: 2019-04-19

## 2019-04-17 RX ORDER — LISINOPRIL 2.5 MG/1
5 TABLET ORAL DAILY
Qty: 0 | Refills: 0 | Status: DISCONTINUED | OUTPATIENT
Start: 2019-04-17 | End: 2019-04-19

## 2019-04-17 RX ADMIN — SIMVASTATIN 10 MILLIGRAM(S): 20 TABLET, FILM COATED ORAL at 21:18

## 2019-04-17 RX ADMIN — SODIUM CHLORIDE 75 MILLILITER(S): 9 INJECTION, SOLUTION INTRAVENOUS at 14:45

## 2019-04-17 RX ADMIN — PIPERACILLIN AND TAZOBACTAM 25 GRAM(S): 4; .5 INJECTION, POWDER, LYOPHILIZED, FOR SOLUTION INTRAVENOUS at 14:59

## 2019-04-17 RX ADMIN — PIPERACILLIN AND TAZOBACTAM 25 GRAM(S): 4; .5 INJECTION, POWDER, LYOPHILIZED, FOR SOLUTION INTRAVENOUS at 05:47

## 2019-04-17 RX ADMIN — Medication 100 MILLIGRAM(S): at 05:48

## 2019-04-17 RX ADMIN — LISINOPRIL 5 MILLIGRAM(S): 2.5 TABLET ORAL at 05:48

## 2019-04-17 NOTE — BRIEF OPERATIVE NOTE - NSICDXBRIEFPROCEDURE_GEN_ALL_CORE_FT
PROCEDURES:  Resection, metatarsal bone 17-Apr-2019 14:26:18 right foot 3rd metatarsal Vilma Gutierrez

## 2019-04-17 NOTE — PROGRESS NOTE ADULT - PROBLEM SELECTOR PLAN 4
-SCDs for now on day of surgery; discuss with podiatry about restarting home Eliquis tomorrow morning

## 2019-04-17 NOTE — BRIEF OPERATIVE NOTE - NSICDXBRIEFPOSTOP_GEN_ALL_CORE_FT
POST-OP DIAGNOSIS:  Other chronic osteomyelitis of foot 17-Apr-2019 14:27:25 right foot 3rd digit Vilma Gutierrez

## 2019-04-17 NOTE — PROGRESS NOTE ADULT - SUBJECTIVE AND OBJECTIVE BOX
Patient is a 68y old  Male who presents with a chief complaint of R foot ulcer.       INTERVAL HPI/OVERNIGHT EVENTS: Pt went to the OR today for debridement and resection of 3rd metatarsal that appeared infected on MRI. Post-op, in the PACU, pt reported no significant pain in his foot. Pt denies fever, chills, CP, SOB, palpitations, abd pain.     MEDICATIONS  (STANDING):  lisinopril 5 milliGRAM(s) Oral daily  metoprolol succinate  milliGRAM(s) Oral daily  piperacillin/tazobactam IVPB. 3.375 Gram(s) IV Intermittent every 8 hours  simvastatin 10 milliGRAM(s) Oral at bedtime    MEDICATIONS  (PRN):      Allergies    No Known Allergies    Intolerances        REVIEW OF SYSTEMS:  CONSTITUTIONAL: No fever or chills  HEENT:  No headache, no sore throat  RESPIRATORY: No cough, wheezing, or shortness of breath  CARDIOVASCULAR: No chest pain, palpitations  GASTROINTESTINAL: No abd pain, nausea, vomiting, or diarrhea  GENITOURINARY: No dysuria, frequency, or hematuria  NEUROLOGICAL: no focal weakness or dizziness  MUSCULOSKELETAL: no significant pain in R foot directly post-op    Vital Signs Last 24 Hrs  T(C): 36.8 (17 Apr 2019 21:19), Max: 36.8 (17 Apr 2019 21:19)  T(F): 98.2 (17 Apr 2019 21:19), Max: 98.2 (17 Apr 2019 21:19)  HR: 59 (17 Apr 2019 21:19) (53 - 68)  BP: 116/66 (17 Apr 2019 21:19) (116/66 - 168/76)  BP(mean): --  RR: 18 (17 Apr 2019 21:19) (12 - 19)  SpO2: 96% (17 Apr 2019 21:19) (95% - 99%)    PHYSICAL EXAM:  GENERAL: NAD  HEENT:  anicteric, moist mucous membranes  CHEST/LUNG:  CTA b/l, no rales, wheezes, or rhonchi  HEART:  RRR, S1, S2  ABDOMEN:  BS+, soft, nontender, nondistended  EXTREMITIES: R foot heavily bandaged post-op ; no edema, cyanosis, or calf tenderness  NERVOUS SYSTEM: answers questions and follows commands appropriately    LABS:                        15.3   4.81  )-----------( 127      ( 17 Apr 2019 08:59 )             45.5     CBC Full  -  ( 17 Apr 2019 08:59 )  WBC Count : 4.81 K/uL  Hemoglobin : 15.3 g/dL  Hematocrit : 45.5 %  Platelet Count - Automated : 127 K/uL  Mean Cell Volume : 88.9 fl  Mean Cell Hemoglobin : 29.9 pg  Mean Cell Hemoglobin Concentration : 33.6 gm/dL  Auto Neutrophil # : x  Auto Lymphocyte # : x  Auto Monocyte # : x  Auto Eosinophil # : x  Auto Basophil # : x  Auto Neutrophil % : x  Auto Lymphocyte % : x  Auto Monocyte % : x  Auto Eosinophil % : x  Auto Basophil % : x    17 Apr 2019 08:59    140    |  108    |  14     ----------------------------<  91     4.0     |  24     |  1.10     Ca    8.8        17 Apr 2019 08:59      PT/INR - ( 16 Apr 2019 10:01 )   PT: 14.2 sec;   INR: 1.24 ratio         PTT - ( 16 Apr 2019 10:01 )  PTT:38.7 sec    CAPILLARY BLOOD GLUCOSE            Culture - Blood (collected 04-16-19 @ 16:41)  Source: .Blood Blood-Peripheral  Preliminary Report (04-17-19 @ 17:02):    No growth to date.    Culture - Blood (collected 04-16-19 @ 16:41)  Source: .Blood Blood-Peripheral  Preliminary Report (04-17-19 @ 17:02):    No growth to date.    Culture - Other (collected 04-15-19 @ 22:34)  Source: .Other Right Plantar Foot  Final Report (04-17-19 @ 16:30):    Few Coag Negative Staphylococcus "Susceptibilities not performed"    Few Corynebacterium species "Susceptibilities not performed"        RADIOLOGY & ADDITIONAL TESTS:    Personally reviewed.     Consultant(s) Notes Reviewed:  [x] YES  [ ] NO Patient is a 68y old  Male who presents with a chief complaint of R foot ulcer.     INTERVAL HPI/OVERNIGHT EVENTS: Pt went to the OR today for debridement and resection of 3rd metatarsal that appeared infected on MRI. Post-op, in the PACU, pt reported no significant pain in his foot. Pt denies fever, chills, CP, SOB, palpitations, abd pain.     MEDICATIONS  (STANDING):  lisinopril 5 milliGRAM(s) Oral daily  metoprolol succinate  milliGRAM(s) Oral daily  piperacillin/tazobactam IVPB. 3.375 Gram(s) IV Intermittent every 8 hours  simvastatin 10 milliGRAM(s) Oral at bedtime    MEDICATIONS  (PRN):      Allergies    No Known Allergies    Intolerances        REVIEW OF SYSTEMS:  CONSTITUTIONAL: No fever or chills  HEENT:  No headache, no sore throat  RESPIRATORY: No cough, wheezing, or shortness of breath  CARDIOVASCULAR: No chest pain, palpitations  GASTROINTESTINAL: No abd pain, nausea, vomiting, or diarrhea  GENITOURINARY: No dysuria, frequency, or hematuria  NEUROLOGICAL: no focal weakness or dizziness  MUSCULOSKELETAL: no significant pain in R foot directly post-op    Vital Signs Last 24 Hrs  T(C): 36.8 (17 Apr 2019 21:19), Max: 36.8 (17 Apr 2019 21:19)  T(F): 98.2 (17 Apr 2019 21:19), Max: 98.2 (17 Apr 2019 21:19)  HR: 59 (17 Apr 2019 21:19) (53 - 68)  BP: 116/66 (17 Apr 2019 21:19) (116/66 - 168/76)  BP(mean): --  RR: 18 (17 Apr 2019 21:19) (12 - 19)  SpO2: 96% (17 Apr 2019 21:19) (95% - 99%)    PHYSICAL EXAM:  GENERAL: NAD  HEENT:  anicteric, moist mucous membranes  CHEST/LUNG:  CTA b/l, no rales, wheezes, or rhonchi  HEART:  RRR, S1, S2  ABDOMEN:  BS+, soft, nontender, nondistended  EXTREMITIES: R foot heavily bandaged post-op ; no edema, cyanosis, or calf tenderness  NERVOUS SYSTEM: answers questions and follows commands appropriately    LABS:                        15.3   4.81  )-----------( 127      ( 17 Apr 2019 08:59 )             45.5     CBC Full  -  ( 17 Apr 2019 08:59 )  WBC Count : 4.81 K/uL  Hemoglobin : 15.3 g/dL  Hematocrit : 45.5 %  Platelet Count - Automated : 127 K/uL  Mean Cell Volume : 88.9 fl  Mean Cell Hemoglobin : 29.9 pg  Mean Cell Hemoglobin Concentration : 33.6 gm/dL  Auto Neutrophil # : x  Auto Lymphocyte # : x  Auto Monocyte # : x  Auto Eosinophil # : x  Auto Basophil # : x  Auto Neutrophil % : x  Auto Lymphocyte % : x  Auto Monocyte % : x  Auto Eosinophil % : x  Auto Basophil % : x    17 Apr 2019 08:59    140    |  108    |  14     ----------------------------<  91     4.0     |  24     |  1.10     Ca    8.8        17 Apr 2019 08:59      PT/INR - ( 16 Apr 2019 10:01 )   PT: 14.2 sec;   INR: 1.24 ratio         PTT - ( 16 Apr 2019 10:01 )  PTT:38.7 sec    CAPILLARY BLOOD GLUCOSE            Culture - Blood (collected 04-16-19 @ 16:41)  Source: .Blood Blood-Peripheral  Preliminary Report (04-17-19 @ 17:02):    No growth to date.    Culture - Blood (collected 04-16-19 @ 16:41)  Source: .Blood Blood-Peripheral  Preliminary Report (04-17-19 @ 17:02):    No growth to date.    Culture - Other (collected 04-15-19 @ 22:34)  Source: .Other Right Plantar Foot  Final Report (04-17-19 @ 16:30):    Few Coag Negative Staphylococcus "Susceptibilities not performed"    Few Corynebacterium species "Susceptibilities not performed"        RADIOLOGY & ADDITIONAL TESTS:    Personally reviewed.     Consultant(s) Notes Reviewed:  [x] YES  [ ] NO

## 2019-04-17 NOTE — PROGRESS NOTE ADULT - PROBLEM SELECTOR PLAN 2
-Recently diagnosed Afib at [facility] and has been in NSR since DCCV at that time  -holding Eliquis on day of surgery; discuss with podiatry about restarting tomorrow morning  -Continue metoprolol for rate control  -cardio recs appreciated

## 2019-04-17 NOTE — PROGRESS NOTE ADULT - ASSESSMENT
68m recent dx of first episode of.  After resection of a melanoma, in the rr he was noted to be in asymptomatic AF.  He was anticoagulated, rate controlled.  Echo unremarkable.  Noted to have brief wct and so had a nuclear stress, which revealed a small area of inferolateral ischemia, managed conservatively.  YANICK DCCV was performed, and he was dc in sr.  He was seen in the office afterward and was told to return in 3 months.    He now comes because of concerns about osteo of the plantar surface of the right foot.  He is planned for debridement.  Preop consultation is being obtained.    He reports being active at baseline, without sxs of angina, heart failure or arrhythmia.    - No evidence of acute ischemia.  Small, mild defect in the mid to distal infero-lateral wall in his Nuclear Stress Test on 3/17/19  - No evidence of significant arrhythmia or volume overload. Normal lvef.  - He remains in NSR following the DCCV last month    - Can hold Eliquis prior to his planned ortho procedure.  Please resume when cleared by ortho  - Continue BB    - Continue lisinopril, may increase for better BP control  - Continue statin  - He is optimized from a cardiovascular perspective for his planned low risk podiatric procedure.  Routine hemodynamic monitoring is recommended.    - DVT prophylaxis with PAS for now  - Monitor electrolytes, keep k>4, Mg>2  - Will follow    Yarelis Givens NP  Cardiology

## 2019-04-17 NOTE — BRIEF OPERATIVE NOTE - NSICDXBRIEFPREOP_GEN_ALL_CORE_FT
PRE-OP DIAGNOSIS:  Chronic osteomyelitis of foot 17-Apr-2019 14:26:56 right foot 3rd metatarsal Vilma Gutierrez

## 2019-04-17 NOTE — PROGRESS NOTE ADULT - ASSESSMENT
67 yo M PMH afib (recent diagnosis at [facility] 3/2019, on metoprolol and Eliquis), HTN, history of R arm clot s/p picc line 7 years prior, multiple surgeries including laminectomy that led to bilateral feet neuropathy, and multiple feet surgeries, sent to the ED by Dr. Mcrae for probable surgery 2/2 to R plantar surface wound for possible osteomyelitis.

## 2019-04-17 NOTE — PROGRESS NOTE ADULT - PROBLEM SELECTOR PLAN 1
-MRI with concern for OM in 3rd metatarsal   -pt and podiatry Dr. Mcrae, discussed and agreed to pursue surgical resection of infected-appearing bone, which pt had today  -on empiric zosyn currently; consulted ID (Dr. Frye) who will see pt tomorrow -- will c/w zosyn for now and f/up ID recs  -no sign of systemic infection currently   -BCx negative  -f/up wound cultures and pathology  -pain control when pt starts to have pain  -phys therapy tomorrow when weight bearing established by podiatry

## 2019-04-17 NOTE — PROGRESS NOTE ADULT - SUBJECTIVE AND OBJECTIVE BOX
[facility] -- Karla Reed, Santos, Fredis, Thomas Echevarria Savella  Office # [phone]    Follow Up:  Preop Eval, Afib on AC    Subjective/Observations: c/o inability to sleep last night.  Denies any respiratory or cardiac discomfort.  Denies foot pain    REVIEW OF SYSTEMS: All other review of systems is negative unless indicated above    PAST MEDICAL & SURGICAL HISTORY:  Afib  Melanocytic nevi of left ear  Arthritis: hx of multiple knee surgery  Hypertension  History of skin cancer in adulthood: left ear lobe wide excision 2018  S/P foot surgery: right   Ulnar nerve impingement: left   S/P knee surgery: arthroscopy bilat knees - total 3 times - , ,   S/P carpal tunnel release: left   S/P laminectomy:     MEDICATIONS  (STANDING):  lisinopril 5 milliGRAM(s) Oral daily  metoprolol succinate  milliGRAM(s) Oral daily  piperacillin/tazobactam IVPB. 3.375 Gram(s) IV Intermittent every 8 hours  simvastatin 10 milliGRAM(s) Oral at bedtime    MEDICATIONS  (PRN):    Allergies    No Known Allergies    Intolerances    Vital Signs Last 24 Hrs  T(C): 36.3 (2019 05:19), Max: 37.3 (2019 23:30)  T(F): 97.3 (2019 05:19), Max: 99.2 (2019 23:30)  HR: 67 (2019 05:19) (53 - 67)  BP: 167/91 (2019 05:19) (154/85 - 176/98)  BP(mean): --  RR: 18 (2019 05:19) (15 - 20)  SpO2: 96% (2019 05:19) (96% - 100%)    I&O's Summary    PHYSICAL EXAM:  TELE: Not on tele  Constitutional: NAD, awake and alert, obese  HEENT: Moist Mucous Membranes, Anicteric  Pulmonary: Non-labored, breath sounds are clear bilaterally, No wheezing, rales or rhonchi  Cardiovascular: Regular, S1 and S2, No murmurs, rubs, gallops or clicks  Gastrointestinal: Bowel Sounds present, soft, nontender.   Lymph: No peripheral edema. No lymphadenopathy.  Skin: No visible rashes left foot 3rd digit ulcer.  Psych:  Mood & affect appropriate    LABS: All Labs Reviewed:                        16.3   6.47  )-----------( 174      ( 2019 10:01 )             49.0     2019 08:59    140    |  108    |  14     ----------------------------<  91     4.0     |  24     |  1.10   2019 10:01    140    |  110    |  17     ----------------------------<  92     4.4     |  24     |  1.20     Ca    8.8        2019 08:59  Ca    8.8        2019 10:01    TPro  7.2    /  Alb  3.7    /  TBili  0.7    /  DBili  x      /  AST  24     /  ALT  32     /  AlkPhos  70     2019 10:01    PT/INR - ( 2019 10:01 )   PT: 14.2 sec;   INR: 1.24 ratio     PTT - ( 2019 10:01 )  PTT:38.7 sec    < from: TTE with Doppler (w/Cont) (03.15.19 @ 10:40) >    Patient name: KHOI RAMOS  YOB: 1950   Age: 68 (M)   MR#: [id number]  Study Date: 3/15/2019  [facility]  Study quality: Technically difficult  Referring Physician: Magy Gee MD  Blood Pressure: 132/84 mmHg  Height: 190 cm  Weight: 106 kg  BSA: 2.4 m2  ------------------------------------------------------------------------  PROCEDURE: Transthoracic echocardiogram with 2-D, M-Mode  and complete spectral and color flow Doppler. Verbal  consent was obtained for injection of  Ultrasonic Enhancing  Agent following a discussion of risks and benefits.  Following intravenous injection of Ultrasonic Enhancing  Agent , harmonic imaging was performed.  INDICATION: Abnormal electrocardiogram (R94.31 )  ------------------------------------------------------------------------  Dimensions:    Normal Values:  LA:     4.2    2.0 - 4.0 cm  Ao:            2.0 - 3.8 cm  SEPTUM: 1.4    0.6 - 1.2 cm  PWT:    1.0    0.6 - 1.1 cm  LVIDd:  4.7    3.0 - 5.6 cm  LVIDs:  3.4    1.8 - 4.0 cm  Derived variables:  LVMI: 90 g/m2  RWT: 0.42  Fractional short: 28 %  EF (Teicholtz): 54 %  Doppler Peak Velocity (m/sec): AoV=1.0  ------------------------------------------------------------------------  Observations:  Mitral Valve: Normal mitral valve. Minimal mitral  regurgitation.  Aortic Valve/Aorta: Calcified trileaflet aortic valve with  normal opening. Peak transaortic valve gradient equals 4 mm  Hg, mean transaortic valve gradient equals 3 mm Hg, aortic  valve velocity time integral equals 23 cm. Minimal aortic  regurgitation.  Peak left ventricular outflow tract  gradient equals 3 mm Hg, mean gradient is equal to 1 mm Hg,  LVOT velocity time integral equals 16 cm.  Normal aortic root.  Left Atrium: Normal left atrium.  LA volume index = 19  cc/m2.  Left Ventricle: Normal left ventricular systolic function.   Endocardial visualization enhanced with intravenous  injection of Ultrasonic Enhancing Agent (Definity). Normal  left ventricular internal dimensions and wall thickness.  Right Heart: Normal right atrium. The right ventricle is  not well visualized; grossly normal right ventricular  systolic function. Normal tricuspid valve. Minimal  tricuspid regurgitation. Normal pulmonic valve.Minimal  pulmonic regurgitation.  Pericardium/Pleura: Normal pericardium with no pericardial  effusion.  Hemodynamic: Estimated right atrial pressure is 8 mm Hg.  Estimated right ventricular systolic pressure equals 22 mm  Hg, assuming right atrial pressure equals 8 mm Hg,  consistent with normal pulmonary pressures.  ------------------------------------------------------------------------  Conclusions:  1. Minimal aortic regurgitation.  2. Normal left ventricular internal dimensions and wall  thickness.  3. Normal left ventricular systolic function.   Endocardial  visualization enhanced with intravenous injection of  Ultrasonic Enhancing Agent (Definity).  *** No previous Echo exam.  ------------------------------------------------------------------------  Confirmed on  3/15/2019 - 14:37:54 by Rush Giles M.D.  ------------------------------------------------------------------------    < end of copied text >      < from: Xray Chest 2 Views PA/Lat (19 @ 09:42) >    EXAM:  XR CHEST PA LAT 2V                            PROCEDURE DATE:  2019          INTERPRETATION:  CLINICAL STATEMENT: Chest pain.    TECHNIQUE: PA and lateral views of the chest.    COMPARISON: 3/14/2019    FINDINGS/  IMPRESSION:  Chronic deformity right clavicle. No new consolidation or pleural   effusion.    Heart size within normal limits.    JARRETT AMOS M.D., ATTENDING RADIOLOGIST  This document has been electronically signed. 2019  9:54AM      < end of copied text >     < from: Nuclear Stress Test-Pharmacologic (19 @ 11:35) >    PATIENT: KHOI RAMOS  : 1950   AGE: 68 (M)   MR#: [id number]  STUDY DATE: 2019  LOCATION: [address] [place] [address]  REF. PHYSICIAN(S): Magy Gee MD  FELLOW: Zina WHITE NP  ------------------------------------------------------------------------  TYPE OF TEST: Rest/Stress Pharmacologic  INDICATION: Abnormal electrocardiogram (ECG) (EKG)  (R94.31)  ------------------------------------------------------------------------  HISTORY:  CARDIAC HISTORY: 68 years old male s/p melanoma surgery  found to be in new onset atrial fibrillation with  controlled rate presents for ischemic evaluation.  RISK FACTORS: Elevated Cholesterol, Hypertension  MEDICATIONS: Eliquis, ASA, Metoprolol  ------------------------------------------------------------------------  BASELINE ELECTROCARDIOGRAM:  Rhythm: Atrial Fibrillation - 83 BPM  Conduction Defect: Early Repolarization  ------------------------------------------------------------------------  HEMODYNAMIC PARAMETERS:                  HR      BP  Baseline  Pre-Injection             67  135/88  00:00     Inject Regadenoson         0  00:30     Post Injection             0  01:00     Post Injection            85  121/70  02:00     Post Injection            83  120/82  03:00     Post Injection            88 138/103  04:00     Post Injection            82 140/117  05:00     Recovery                  87 156/116  06:00     Recovery                  88  140/93  07:00     Recovery                  90 141/101  08:00Recovery                  86  143/95  ------------------------------------------------------------------------  Agent: Regadenoson 0.4 mg/5 ml NS. intravenously injected  over 10 sec.  HR: Baseline HR: 67 bpm   Peak HR: 90 bpm (60% of MPHR)  MPHR: 151 bpm   85% of MPHR: 128 bpm  BP: Baseline BP: 135/88 mmHg   Peak BP: 140/117 mmHg  Peak RPP: [id number] (Rate Pressure Product)  Last Caffeine intake: 12 hrs  Terminated: Completion of protocol  ------------------------------------------------------------------------  SYMPTOMS/FINDINGS:  Symptoms: No Symptom  Chest pain: No chest pain with administration of  Regadenoson  ------------------------------------------------------------------------  ECG ABNORMALITIES DURING/AFTER STRESS:   Abnormalities: None  ------------------------------------------------------------------------  NEW ARRHYTHMIAS DEVELOPED DURING/AFTER STRESS:  None  ------------------------------------------------------------------------  STRESS TEST IMPRESSIONS:  Chest Pain: No chest pain with administration of  Regadenoson.  Symptom: No Symptom.  HR Response: Appropriate.  BP Response: Appropriate.  Heart Rhythm: Atrial Fibrillation - 83 BPM.  Conduction defects: Early Repolarization.  ECG Abnormalities: None.  Arrhythmia: None.  ------------------------------------------------------------------------  PROCEDURE:  8.2 mCi of Tc99m Sestamibi were injected intravenously at  rest. Approximately 75 minutes later, tomographic images  were obtained in a 180 degree arc from right anterior  oblique to left anterior oblique with 64 stops. At a  separate time, 16.7 mCi of Tc99m Sestamibi were injected  intravenously during stress protocol. Approximately 125  minute(s) later, tomographic images were obtained in a 180  degree arc from right anterior oblique to left anterior  oblique with 64 stops. The tomographic slices were  reconstructed in 3 orthogonal planes (short axis,  horizontal long axis and vertical long axis).  Interpretation was performed both by visual and  quantitative analysis.  Images were re-acquired with the patient in a prone  position.  Rest and stress images were acquired using CZT-based  system with pinhole collimation (Emergent Views 530 c, Fleep), and reconstructed using MLEM algorithm.  ------------------------------------------------------------------------  NUCLEAR FINDINGS:  There is a small, mild defect in the mid to distal  infero-lateral wall that is reversible consistent with  ischemia.  ------------------------------------------------------------------------  GATED ANALYSIS:  Post-stress gated wall motion analysis was performed (LVEF  = 59 %;LVEDV = 104 ml.), revealing mild hypokinesis in  distal inferolateral wall(s).  ------------------------------------------------------------------------  IMPRESSIONS:Abnormal Study  * Chest Pain: No chest pain with administration of  Regadenoson.  * Symptom: No Symptom.  * HR Response: Appropriate.  * BP Response: Appropriate.  * Heart Rhythm: Atrial Fibrillation - 83 BPM.  * Conduction defects: Early Repolarization.  * ECG Abnormalities: None.  * Arrhythmia: None.  * There is a small, mild defect in the mid to distal  infero-lateral wall that is reversible consistent with  ischemia.  * Post-stress gated wall motion analysis was performed  (LVEF = 59 %;LVEDV = 104 ml.), revealing mild hypokinesis  in distal inferolateral wall(s).  Conclusion:  There is a small, mild defect in the mid to distal  infero-lateral wall that is reversible consistent with  ischemia.  ------------------------------------------------------------------------  Confirmed on  3/17/2019 - 16:46:05 by [place] D.O.  ------------------------------------------------------------------------    < end of copied text >

## 2019-04-18 DIAGNOSIS — M86.9 OSTEOMYELITIS, UNSPECIFIED: ICD-10-CM

## 2019-04-18 LAB
ANION GAP SERPL CALC-SCNC: 4 MMOL/L — LOW (ref 5–17)
BUN SERPL-MCNC: 17 MG/DL — SIGNIFICANT CHANGE UP (ref 7–23)
CALCIUM SERPL-MCNC: 8.9 MG/DL — SIGNIFICANT CHANGE UP (ref 8.5–10.1)
CHLORIDE SERPL-SCNC: 106 MMOL/L — SIGNIFICANT CHANGE UP (ref 96–108)
CO2 SERPL-SCNC: 29 MMOL/L — SIGNIFICANT CHANGE UP (ref 22–31)
CREAT SERPL-MCNC: 1.4 MG/DL — HIGH (ref 0.5–1.3)
GLUCOSE SERPL-MCNC: 104 MG/DL — HIGH (ref 70–99)
GRAM STN FLD: SIGNIFICANT CHANGE UP
HCT VFR BLD CALC: 47.3 % — SIGNIFICANT CHANGE UP (ref 39–50)
HGB BLD-MCNC: 15.9 G/DL — SIGNIFICANT CHANGE UP (ref 13–17)
MCHC RBC-ENTMCNC: 30.3 PG — SIGNIFICANT CHANGE UP (ref 27–34)
MCHC RBC-ENTMCNC: 33.6 GM/DL — SIGNIFICANT CHANGE UP (ref 32–36)
MCV RBC AUTO: 90.1 FL — SIGNIFICANT CHANGE UP (ref 80–100)
NRBC # BLD: 0 /100 WBCS — SIGNIFICANT CHANGE UP (ref 0–0)
PLATELET # BLD AUTO: 149 K/UL — LOW (ref 150–400)
POTASSIUM SERPL-MCNC: 4.8 MMOL/L — SIGNIFICANT CHANGE UP (ref 3.5–5.3)
POTASSIUM SERPL-SCNC: 4.8 MMOL/L — SIGNIFICANT CHANGE UP (ref 3.5–5.3)
RBC # BLD: 5.25 M/UL — SIGNIFICANT CHANGE UP (ref 4.2–5.8)
RBC # FLD: 13.2 % — SIGNIFICANT CHANGE UP (ref 10.3–14.5)
SODIUM SERPL-SCNC: 139 MMOL/L — SIGNIFICANT CHANGE UP (ref 135–145)
SPECIMEN SOURCE: SIGNIFICANT CHANGE UP
WBC # BLD: 7.75 K/UL — SIGNIFICANT CHANGE UP (ref 3.8–10.5)
WBC # FLD AUTO: 7.75 K/UL — SIGNIFICANT CHANGE UP (ref 3.8–10.5)

## 2019-04-18 PROCEDURE — 99232 SBSQ HOSP IP/OBS MODERATE 35: CPT

## 2019-04-18 PROCEDURE — 99233 SBSQ HOSP IP/OBS HIGH 50: CPT

## 2019-04-18 RX ORDER — CEFAZOLIN SODIUM 1 G
1000 VIAL (EA) INJECTION EVERY 8 HOURS
Qty: 0 | Refills: 0 | Status: DISCONTINUED | OUTPATIENT
Start: 2019-04-18 | End: 2019-04-19

## 2019-04-18 RX ORDER — APIXABAN 2.5 MG/1
5 TABLET, FILM COATED ORAL EVERY 12 HOURS
Qty: 0 | Refills: 0 | Status: DISCONTINUED | OUTPATIENT
Start: 2019-04-18 | End: 2019-04-19

## 2019-04-18 RX ADMIN — APIXABAN 5 MILLIGRAM(S): 2.5 TABLET, FILM COATED ORAL at 17:30

## 2019-04-18 RX ADMIN — OXYCODONE AND ACETAMINOPHEN 1 TABLET(S): 5; 325 TABLET ORAL at 14:18

## 2019-04-18 RX ADMIN — OXYCODONE AND ACETAMINOPHEN 1 TABLET(S): 5; 325 TABLET ORAL at 06:48

## 2019-04-18 RX ADMIN — OXYCODONE AND ACETAMINOPHEN 1 TABLET(S): 5; 325 TABLET ORAL at 05:26

## 2019-04-18 RX ADMIN — LISINOPRIL 5 MILLIGRAM(S): 2.5 TABLET ORAL at 05:21

## 2019-04-18 RX ADMIN — Medication 100 MILLIGRAM(S): at 22:04

## 2019-04-18 RX ADMIN — SIMVASTATIN 10 MILLIGRAM(S): 20 TABLET, FILM COATED ORAL at 22:05

## 2019-04-18 RX ADMIN — PIPERACILLIN AND TAZOBACTAM 25 GRAM(S): 4; .5 INJECTION, POWDER, LYOPHILIZED, FOR SOLUTION INTRAVENOUS at 05:22

## 2019-04-18 RX ADMIN — Medication 100 MILLIGRAM(S): at 05:21

## 2019-04-18 RX ADMIN — OXYCODONE AND ACETAMINOPHEN 1 TABLET(S): 5; 325 TABLET ORAL at 15:15

## 2019-04-18 NOTE — CONSULT NOTE ADULT - CONSULT REASON
right 3rd submetatarsal osteomyelitis
Patient is a 68y old  Male who presents with a chief complaint of r/o osteomyelitis of right foot 3rd digit (16 Apr 2019 12:41)
preop eval, af

## 2019-04-18 NOTE — PROGRESS NOTE ADULT - SUBJECTIVE AND OBJECTIVE BOX
[facility] -- Karla Reed, Santos, Fredis, Thomas Echevarria Savella  Office # [phone]      Follow Up:  post op, PAF    Subjective/Observations: Patient seen and examined. Events noted. Resting comfortably in bed. No complaints of chest pain, dyspnea, or palpitations reported. No signs of orthopnea or PND. Tolerated 3rd metatarsal amputation well.       REVIEW OF SYSTEMS: All other review of systems is negative unless indicated above    PAST MEDICAL & SURGICAL HISTORY:  Afib  Melanocytic nevi of left ear  Arthritis: hx of multiple knee surgery  Hypertension  History of skin cancer in adulthood: left ear lobe wide excision 7/2018  S/P foot surgery: right 2007  Ulnar nerve impingement: left 1996  S/P knee surgery: arthroscopy bilat knees - total 3 times - 2000, 2002, 2010  S/P carpal tunnel release: left 1996  S/P laminectomy: 1995      MEDICATIONS  (STANDING):  lisinopril 5 milliGRAM(s) Oral daily  metoprolol succinate  milliGRAM(s) Oral daily  piperacillin/tazobactam IVPB. 3.375 Gram(s) IV Intermittent every 8 hours  simvastatin 10 milliGRAM(s) Oral at bedtime    MEDICATIONS  (PRN):  acetaminophen   Tablet .. 650 milliGRAM(s) Oral every 6 hours PRN Moderate Pain (4 - 6)  oxyCODONE    5 mG/acetaminophen 325 mG 1 Tablet(s) Oral every 6 hours PRN Severe Pain (7 - 10)      Allergies    No Known Allergies    Intolerances            Vital Signs Last 24 Hrs  T(C): 36.7 (18 Apr 2019 04:45), Max: 36.8 (17 Apr 2019 21:19)  T(F): 98.1 (18 Apr 2019 04:45), Max: 98.2 (17 Apr 2019 21:19)  HR: 65 (18 Apr 2019 04:45) (53 - 68)  BP: 158/80 (18 Apr 2019 04:45) (116/66 - 168/76)  BP(mean): --  RR: 14 (18 Apr 2019 04:45) (12 - 19)  SpO2: 94% (18 Apr 2019 04:45) (94% - 99%)    I&O's Summary    17 Apr 2019 07:01  -  18 Apr 2019 07:00  --------------------------------------------------------  IN: 150 mL / OUT: 0 mL / NET: 150 mL          PHYSICAL EXAM:     Constitutional: NAD, awake and alert, well-developed  HEENT: Moist Mucous Membranes, Anicteric  Pulmonary: Non-labored, breath sounds are clear bilaterally, No wheezing, rales or rhonchi  Cardiovascular: Regular, S1 and S2, No murmurs, rubs, gallops or clicks  Gastrointestinal: Bowel Sounds present, soft, nontender.   Lymph: No peripheral edema. No lymphadenopathy.  Skin: No visible rashes or ulcers.  Psych:  Mood & affect appropriate for situation    LABS: All Labs Reviewed:                        15.3   4.81  )-----------( 127      ( 17 Apr 2019 08:59 )             45.5                         16.3   6.47  )-----------( 174      ( 16 Apr 2019 10:01 )             49.0     17 Apr 2019 08:59    140    |  108    |  14     ----------------------------<  91     4.0     |  24     |  1.10   16 Apr 2019 10:01    140    |  110    |  17     ----------------------------<  92     4.4     |  24     |  1.20     Ca    8.8        17 Apr 2019 08:59  Ca    8.8        16 Apr 2019 10:01    TPro  7.2    /  Alb  3.7    /  TBili  0.7    /  DBili  x      /  AST  24     /  ALT  32     /  AlkPhos  70     16 Apr 2019 10:01    PT/INR - ( 16 Apr 2019 10:01 )   PT: 14.2 sec;   INR: 1.24 ratio         PTT - ( 16 Apr 2019 10:01 )  PTT:38.7 sec How Severe Is Your Skin Lesion?: mild Have Your Skin Lesions Been Treated?: not been treated Is This A New Presentation, Or A Follow-Up?: Skin Lesions

## 2019-04-18 NOTE — PROGRESS NOTE ADULT - SUBJECTIVE AND OBJECTIVE BOX
Patient is a 68y old  Male who presents with a chief complaint of r/o osteomyelitis of right foot 3rd digit  (16 Apr 2019 12:41)    HPI:  69 yo M PMH afib (recent diagnosis at [facility] 3/2019, on metoprolol and Eliquis), HTN, history of R arm clot s/p picc line 7 years prior, multiple surgeries including laminectomy that led to bilateral feet neuropathy, and multiple feet surgeries, seen at bedside during podiatry am rounds with Dr Mcrae. Patient is s/p right foot 3rd digit partial amputation. Patient deneis pain or discomfort at this time. Patient denies N/F/V/SOB/CP or calf pain at this time. Patient states he has eaten since surgery and has voided and had a bowl movement. Patient has no further pedal complaints at this time     Vital Signs Last 24 Hrs  T(C): 36.7 (18 Apr 2019 04:45), Max: 36.8 (17 Apr 2019 21:19)  T(F): 98.1 (18 Apr 2019 04:45), Max: 98.2 (17 Apr 2019 21:19)  HR: 65 (18 Apr 2019 04:45) (53 - 68)  BP: 158/80 (18 Apr 2019 04:45) (116/66 - 168/76)  BP(mean): --  RR: 14 (18 Apr 2019 04:45) (12 - 19)  SpO2: 94% (18 Apr 2019 04:45) (94% - 99%)                          15.9   7.75  )-----------( 149      ( 18 Apr 2019 09:37 )             47.3     04-18    139  |  106  |  17  ----------------------------<  104<H>  4.8   |  29  |  1.40<H>    Ca    8.9      18 Apr 2019 09:37      MEDICATIONS  (STANDING):  apixaban 5 milliGRAM(s) Oral every 12 hours  lisinopril 5 milliGRAM(s) Oral daily  metoprolol succinate  milliGRAM(s) Oral daily  piperacillin/tazobactam IVPB. 3.375 Gram(s) IV Intermittent every 8 hours  simvastatin 10 milliGRAM(s) Oral at bedtime    MEDICATIONS  (PRN):  acetaminophen   Tablet .. 650 milliGRAM(s) Oral every 6 hours PRN Moderate Pain (4 - 6)  oxyCODONE    5 mG/acetaminophen 325 mG 1 Tablet(s) Oral every 6 hours PRN Severe Pain (7 - 10)      EXAM: FOOT RIGHT (MINIMUM 3 VIEWS)       PROCEDURE DATE: 04/16/2019         INTERPRETATION: Right foot. Patient has local pain and swelling for 6 weeks   with cellulitis.     Again noted is amputation of the distal aspect of the second metatarsal and   the proximal aspect of the proximal phalanx of the second toe.. Fixation of   the IP joint of the great toe again seen.     There has been interim amputation of the distal aspect of the first   metatarsal compared to May 28, 2013.     There is an inferior calcaneal spur and dorsal tarsal degenerative change.     No bone destruction or fracture is evident on standard film.     IMPRESSION: Prior amputations. No acute finding on standard film.                 JUDE MARTINEZ M.D., ATTENDING RADIOLOGIST   This document has been electronically signed. Apr 16 2019 11:48AM       Objective   Vascular: DP/PT palpable, CFT<3, temp gradient warm to cool, pedal hair absent, +2 localized edema to right lower extremity no varicosities presents   Derm: Right foot plantar sub met packing pulled,  wound measures [id number] probe to bone no mal odor, no maceration right foot dorsal surgical site sutures intact no dehiscences no drainage mild periwound erythema,   Musc: Hammertoes 2-5 right foot, flat foot right foot, no pain upon palpation of right plantar ulcer no calf pain   Nuero: Epicritic sensation absent

## 2019-04-18 NOTE — CONSULT NOTE ADULT - SUBJECTIVE AND OBJECTIVE BOX
HPI:  69 yo M PMH afib HTN, laminectomy complicated by bilateral feet neuropathy, and multiple feet surgeries, sent to the ED by Dr. Mcrae sec to nonhealing right 3rd submetatarsal nonhealing ulcer. Ulcer started as callous and ahs been getting progressively worse. Has clear drainage. MRI showed osteomyelitis. Ps is sp right 3rd submetatarsal resection. No fever chills n/v/d No CP SOB. Denies trauma.     Infectious Disease consult was called to evaluate pt and for antibiotic management.    Past Medical & Surgical Hx:  PAST MEDICAL & SURGICAL HISTORY:  Afib  Melanocytic nevi of left ear  Arthritis: hx of multiple knee surgery  Hypertension  History of skin cancer in adulthood: left ear lobe wide excision 7/2018  S/P foot surgery: right 2007  Ulnar nerve impingement: left 1996  S/P knee surgery: arthroscopy bilat knees - total 3 times - 2000, 2002, 2010  S/P carpal tunnel release: left 1996  S/P laminectomy: 1995    Social History--  EtOH: denies  Tobacco: denies   Drug Use: denies     FAMILY HISTORY:  Noncontributory    Allergies  No Known Allergies    Home Medications:  NONE    Current Inpatient Medications :    ANTIBIOTICS:   piperacillin/tazobactam IVPB. 3.375 Gram(s) IV Intermittent every 8 hours      OTHER RELEVANT MEDICATIONS :  acetaminophen   Tablet .. 650 milliGRAM(s) Oral every 6 hours PRN  apixaban 5 milliGRAM(s) Oral every 12 hours  lisinopril 5 milliGRAM(s) Oral daily  metoprolol succinate  milliGRAM(s) Oral daily  oxyCODONE    5 mG/acetaminophen 325 mG 1 Tablet(s) Oral every 6 hours PRN  simvastatin 10 milliGRAM(s) Oral at bedtime    ROS:  CONSTITUTIONAL:  Negative fever or chills  EYES:  Negative  blurry vision or double vision  CARDIOVASCULAR:  Negative for chest pain or palpitations  RESPIRATORY:  Negative for cough, wheezing, or SOB   GASTROINTESTINAL:  Negative for nausea, vomiting, diarrhea, constipation, or abdominal pain  GENITOURINARY:  Negative frequency, urgency , dysuria or hematuria   NEUROLOGIC:  No headache, confusion, dizziness, lightheadedness  All other systems were reviewed and are negative      I&O's Detail    17 Apr 2019 07:01  -  18 Apr 2019 07:00  --------------------------------------------------------  IN:    lactated ringers.: 150 mL  Total IN: 150 mL    OUT:  Total OUT: 0 mL    Total NET: 150 mL      18 Apr 2019 07:01  -  18 Apr 2019 15:04  --------------------------------------------------------  IN:    IV PiggyBack: 100 mL    Oral Fluid: 480 mL  Total IN: 580 mL    OUT:  Total OUT: 0 mL    Total NET: 580 mL    Physical Exam:  Vital Signs Last 24 Hrs  T(C): 36.6 (18 Apr 2019 14:10), Max: 36.8 (17 Apr 2019 21:19)  T(F): 97.8 (18 Apr 2019 14:10), Max: 98.2 (17 Apr 2019 21:19)  HR: 60 (18 Apr 2019 14:10) (54 - 68)  BP: 149/81 (18 Apr 2019 14:10) (116/66 - 168/76)  RR: 16 (18 Apr 2019 14:10) (14 - 19)  SpO2: 96% (18 Apr 2019 14:10) (94% - 96%)      General: well developed well nourished, in no acute distress  Eyes: sclera anicteric, pupils equal and reactive to light  ENMT: buccal mucosa moist, pharynx not injected  Neck: supple, trachea midline  Lungs: clear, no wheeze/rhonchi  Cardiovascular: regular rate and rhythm, S1 S2  Abdomen: soft, nontender, no organomegaly present, bowel sounds normal  Neurological:  alert and oriented x3, Cranial Nerves II-XII grossly intact  Skin:no increased ecchymosis/petechiae/purpura  Lymph Nodes: no palpable cervical/supraclavicular lymph nodes enlargements  Extremities: Right foot drsg c/d/i    Labs:                         15.9   7.75  )-----------( 149      ( 18 Apr 2019 09:37 )             47.3   04-18    139  |  106  |  17  ----------------------------<  104<H>  4.8   |  29  |  1.40<H>    Ca    8.9      18 Apr 2019 09:37    RECENT CULTURES:    Culture - Tissue with Gram Stain (collected 17 Apr 2019 19:44)  Source: .Tissue Other, Right third metatarsal head  Gram Stain (18 Apr 2019 04:05):    No polymorphonuclear cells seen seen    No organisms seen    Culture - Blood (collected 16 Apr 2019 16:41)  Source: .Blood Blood-Peripheral  Preliminary Report (17 Apr 2019 17:02):    No growth to date.    Culture - Blood (collected 16 Apr 2019 16:41)  Source: .Blood Blood-Peripheral  Preliminary Report (17 Apr 2019 17:02):    No growth to date.    Culture - Other (collected 15 Apr 2019 22:34)  Source: .Other Right Plantar Foot  Final Report (17 Apr 2019 16:30):    Few Coag Negative Staphylococcus "Susceptibilities not performed"    Few Corynebacterium species "Susceptibilities not performed"    RADIOLOGY & ADDITIONAL STUDIES:    EXAM:  MR FOOT WAW IC RT                            PROCEDURE DATE:  04/16/2019          INTERPRETATION:  Clinical information: Right foot third digit wound,   evaluate for osteomyelitis.    MRI of the right forefoot is performed on a 1.5 Traci magnet utilizing   multiplanar multisequence technique pre and post intravenous   administration of 10 cc of Gadavist contrast.    Comparison: Right foot radiograph 4/16/2019.    Magnetic susceptibility artifact related to orthopedic hardware, as well   as motion artifact and inhomogeneous fat saturation degrades image   quality limiting evaluation; some diagnostic information can still be   obtained.    There is magnetic susceptibility artifact related to orthopedic screw at   the great toe.  There has been partial resection of the first metatarsal.    There is a plantar skin and subcutaneous ulcer, with a fluid-filled tract   to the plantar surface of the third metatarsal head.  There is dislocation at the third MTP joint, with dorsal displacement of   the third toe.  There is enhancing bone marrow edema involving the third metatarsal head,   neck and shaft of the metatarsal extending to the proximal aspect of the   third metatarsal. This demonstrates T1 signal hypointensity, and is   compatible with osteomyelitis.    There has been partial resection surrounding the second metatarsal   phalangeal joint. There is possible bone marrow edema involving the base   of the residual proximal phalanx of the second toe. This could reflect   reactive edema, cannot exclude infection/osteomyelitis.    There is flattening with subchondral signal intensity and surrounding   marrow edema involving the fourth metatarsal head, finding most   compatible with subchondral insufficiency fracture and/or osteonecrosis.    There are degenerative changes at the third through fifth tarsometatarsal   articulations with subchondral cystic erosion and reactive bone marrow   edema.    Impression:    Limited study.    Dislocation at the third metatarsophalangeal joint, with adjacent plantar   ulcer at the third metatarsal head and bone marrow edema compatible with   osteomyelitis involving the third metatarsal as discussed above.      Assessment :   69 yo M PMH afib HTN, laminectomy complicated by bilateral feet neuropathy, and multiple feet surgeries admitted with nonhealing right 3rd submetatarsal nonhealing ulcer. MRI showed osteomyelitis. Pt is sp right 3rd submetatarsal resection 4/17/19  LAZARO  Overall stable    Plan :   Hugo Joysyn  Change to Ancef  Fu OR path and culture  Further recommendation pending OR path and culture    D/w DR Cooper    Continue with present regime .  Approptiate use of antibiotics and adverse effects reviewed.      I have discussed the above plan of care with patient/family in detail. They expressed understanding of the treatment plan . Risks, benefits and alternatives discussed in detail. I have asked if they have any questions or concerns and appropriately addressed them to the best of my ability .      > 45 minutes spent in direct patient care reviewing  the notes, lab data/ imaging , discussion with multidisciplinary team. All questions were addressed and answered to the best of my capacity .    Thank you for allowing me to participate in the care of your patient .      Allyson rFye MD  Infectious Disease  [phone]
[facility]         Karla Reed, Santos, Fredis, Swathi, Thomas, Severino        [phone] (office)    CHIEF COMPLAINT: Patient is a 68y old  Male who presents with a chief complaint of r/o osteomyelitis (16 Apr 2019 13:24)      HPI:  68m recent dx of first episode of AF.  After resection of a melanoma, in the rr he was noted to be in asymptomatic AF.  He was anticoagulated, rate controlled.  Echo unremarkable.  Noted to have brief wct and so had a nuclear stress, which revealed a small area of inferolateral ischemia, managed conservatively.  YANICK DCCV was performed, and he was dc in sr.  He was seen in the office afterward and was told to return in 3 months.    He now comes because of concerns about osteo of the plantar surface of the right foot.  He is planned for debridement.  Preop consultation is being obtained.    He reports being active at baseline, without sxs of angina, heart failure or arrhythmia.            PAST MEDICAL & SURGICAL HISTORY:  Afib  Melanocytic nevi of left ear  Arthritis: hx of multiple knee surgery  Hypertension  History of skin cancer in adulthood: left ear lobe wide excision 7/2018  S/P foot surgery: right 2007  Ulnar nerve impingement: left 1996  S/P knee surgery: arthroscopy bilat knees - total 3 times - 2000, 2002, 2010  S/P carpal tunnel release: left 1996  S/P laminectomy: 1995      SOCIAL HISTORY: No active tobacco, alcohol or illicit drug use    FAMILY HISTORY:   No pertinent family history of CAD       MEDICATIONS  (STANDING):  lisinopril 5 milliGRAM(s) Oral daily  metoprolol succinate  milliGRAM(s) Oral daily  piperacillin/tazobactam IVPB. 3.375 Gram(s) IV Intermittent every 8 hours  simvastatin 10 milliGRAM(s) Oral at bedtime    MEDICATIONS  (PRN):      Allergies    No Known Allergies    Intolerances        REVIEW OF SYSTEMS: Is negative for eye, ENT, GI, , allergic, dermatologic, musculoskeletal and neurologic, except as described above.    VITAL SIGNS:   Vital Signs Last 24 Hrs  T(C): 36.6 (16 Apr 2019 08:44), Max: 36.6 (16 Apr 2019 08:44)  T(F): 97.8 (16 Apr 2019 08:44), Max: 97.8 (16 Apr 2019 08:44)  HR: 56 (16 Apr 2019 08:44) (56 - 56)  BP: 130/68 (16 Apr 2019 08:44) (130/68 - 130/68)  BP(mean): --  RR: 14 (16 Apr 2019 08:44) (14 - 14)  SpO2: 98% (16 Apr 2019 08:44) (98% - 98%)    I&O's Summary      PHYSICAL EXAM:    Constitutional: NAD, awake and alert, well-developed  Eyes:  EOMI, no oral cyanosis, conjunctivae clear, anicteric.  Pulmonary: Non-labored, breath sounds are clear bilaterally, no wheezing, rales or rhonchi  Cardiovascular:  regular S1 and S2. No murmur.  No rubs, gallops or clicks  Gastrointestinal: Bowel Sounds present, soft, nontender.   Lymph: No peripheral edema.   Neurological: Alert, strength and sensitivity are grossly intact  Skin: No obvious lesions/rashes.   Psych:  Mood & affect appropriate .    LABS: All Labs Reviewed:                        16.3   6.47  )-----------( 174      ( 16 Apr 2019 10:01 )             49.0     16 Apr 2019 10:01    140    |  110    |  17     ----------------------------<  92     4.4     |  24     |  1.20     Ca    8.8        16 Apr 2019 10:01    TPro  7.2    /  Alb  3.7    /  TBili  0.7    /  DBili  x      /  AST  24     /  ALT  32     /  AlkPhos  70     16 Apr 2019 10:01    PT/INR - ( 16 Apr 2019 10:01 )   PT: 14.2 sec;   INR: 1.24 ratio         PTT - ( 16 Apr 2019 10:01 )  PTT:38.7 sec      Blood Culture:         RADIOLOGY:  < from: Xray Chest 2 Views PA/Lat (04.16.19 @ 09:42) >    EXAM:  XR CHEST PA LAT 2V                            PROCEDURE DATE:  04/16/2019          INTERPRETATION:  CLINICAL STATEMENT: Chest pain.    TECHNIQUE: PA and lateral views of the chest.    COMPARISON: 3/14/2019    FINDINGS/  IMPRESSION:  Chronic deformity right clavicle. No new consolidation or pleural   effusion.    Heart size within normal limits.                  JARRETT AMOS M.D., ATTENDING RADIOLOGIST  This document has been electronically signed. Apr 16 2019  9:54AM                < end of copied text >    EKG: sr    Impression/Plan:
Patient is a 68y old  Male who presents with a chief complaint of r/o osteomyelitis of right foot 3rd digit  (16 Apr 2019 12:41)    HPI:  67 yo M PMH afib (recent diagnosis at [facility] 3/2019, on metoprolol and Eliquis), HTN, history of R arm clot s/p picc line 7 years prior, multiple surgeries including laminectomy that led to bilateral feet neuropathy, and multiple feet surgeries, sent to the ED by Dr. Mcrae for surgery on 4/17/19 2/2 to R plantar surface wound for possible osteomyelitis. Patient states that he first noticed the wound about 2 months earlier, was seeing podiatrist at [facility], admits to clear discharge and blood from wound on occasion for past two months, patient was referred to Dr. Mcrae yesterday, and consequently sent to the ED for probable surgery. Denies any known trauma, swelling, fevers, chills, chest pain, SOB, pain.  Patient is aware that he will be brought to the OR tomorrow for surgical managment of right foot wound and underlying OM.     ED vitals: 97.8, HR: 56, BP: 130/68, RR: 14, 98 on RA   Labs: WBC and lactate wnl, INR: 1.24, Type and Screen was done, BCx drawn, ESR pending   in the ED patient was given 1 dose zosyn, IVF bolus   An MRI was ordered in the ED (16 Apr 2019 12:41)                          16.3   6.47  )-----------( 174      ( 16 Apr 2019 10:01 )             49.0   04-16    140  |  110<H>  |  17  ----------------------------<  92  4.4   |  24  |  1.20    Ca    8.8      16 Apr 2019 10:01    TPro  7.2  /  Alb  3.7  /  TBili  0.7  /  DBili  x   /  AST  24  /  ALT  32  /  AlkPhos  70  04-16    PAST MEDICAL & SURGICAL HISTORY:  Afib  Melanocytic nevi of left ear  Arthritis: hx of multiple knee surgery  Hypertension  History of skin cancer in adulthood: left ear lobe wide excision 7/2018  S/P foot surgery: right 2007  Ulnar nerve impingement: left 1996  S/P knee surgery: arthroscopy bilat knees - total 3 times - 2000, 2002, 2010  S/P carpal tunnel release: left 1996  S/P laminectomy: 1995    Allergies    No Known Allergies    Intolerances    MEDICATIONS  (STANDING):  lisinopril 5 milliGRAM(s) Oral daily  metoprolol succinate  milliGRAM(s) Oral daily  piperacillin/tazobactam IVPB. 3.375 Gram(s) IV Intermittent every 8 hours  simvastatin 10 milliGRAM(s) Oral at bedtime    MEDICATIONS  (PRN):          EXAM: FOOT RIGHT (MINIMUM 3 VIEWS)       PROCEDURE DATE: 04/16/2019         INTERPRETATION: Right foot. Patient has local pain and swelling for 6 weeks   with cellulitis.     Again noted is amputation of the distal aspect of the second metatarsal and   the proximal aspect of the proximal phalanx of the second toe.. Fixation of   the IP joint of the great toe again seen.     There has been interim amputation of the distal aspect of the first   metatarsal compared to May 28, 2013.     There is an inferior calcaneal spur and dorsal tarsal degenerative change.     No bone destruction or fracture is evident on standard film.     IMPRESSION: Prior amputations. No acute finding on standard film.                 JUDE MARTINEZ M.D., ATTENDING RADIOLOGIST   This document has been electronically signed. Apr 16 2019 11:48AM       Objective   Vascular: DP/PT palpable, CFT<3, temp gradient warm to cool, pedal hair absent, +2 localized edema to right lower extremity no varicosities presents   Derm: Right foot plantar sub met wound measures [id number] probe to bone with macerated borders, no drainage, fibrotic base no tunneling no tracking, periwound erythema noted no mal odor   Musc: Hammertoes 2-5 right foot, flat foot right foot, no pain upon palpation of right plantar ulcer no calf pain   Nuero: Epicritic sensation absent

## 2019-04-18 NOTE — PROGRESS NOTE ADULT - SUBJECTIVE AND OBJECTIVE BOX
Patient is a 68y old  Male who presents with a chief complaint of R foot ulcer.      INTERVAL HPI/OVERNIGHT EVENTS: Pt reports right foot pain. Denies fever, chills, diarrhea, constipation, abd pain, CP, palpitations.     MEDICATIONS  (STANDING):  apixaban 5 milliGRAM(s) Oral every 12 hours  ceFAZolin   IVPB 1000 milliGRAM(s) IV Intermittent every 8 hours  lisinopril 5 milliGRAM(s) Oral daily  metoprolol succinate  milliGRAM(s) Oral daily  simvastatin 10 milliGRAM(s) Oral at bedtime    MEDICATIONS  (PRN):  acetaminophen   Tablet .. 650 milliGRAM(s) Oral every 6 hours PRN Moderate Pain (4 - 6)  oxyCODONE    5 mG/acetaminophen 325 mG 1 Tablet(s) Oral every 6 hours PRN Severe Pain (7 - 10)      Allergies    No Known Allergies    Intolerances        REVIEW OF SYSTEMS:  CONSTITUTIONAL: No fever or chills  HEENT:  No headache, no sore throat  RESPIRATORY: No cough, wheezing, or shortness of breath  CARDIOVASCULAR: No chest pain, palpitations  GASTROINTESTINAL: No abd pain, nausea, vomiting, or diarrhea  GENITOURINARY: No dysuria, frequency, or hematuria  NEUROLOGICAL: no focal weakness or dizziness  MUSCULOSKELETAL: +R foot pain    Vital Signs Last 24 Hrs  T(C): 36.6 (18 Apr 2019 14:10), Max: 36.8 (17 Apr 2019 21:19)  T(F): 97.8 (18 Apr 2019 14:10), Max: 98.2 (17 Apr 2019 21:19)  HR: 60 (18 Apr 2019 14:10) (59 - 65)  BP: 149/81 (18 Apr 2019 14:10) (116/66 - 158/80)  BP(mean): --  RR: 16 (18 Apr 2019 14:10) (14 - 18)  SpO2: 96% (18 Apr 2019 14:10) (94% - 96%)    PHYSICAL EXAM:  GENERAL: NAD  HEENT:  anicteric, moist mucous membranes  CHEST/LUNG:  CTA b/l, no rales, wheezes, or rhonchi  HEART:  RRR, S1, S2  ABDOMEN:  BS+, soft, nontender, nondistended  EXTREMITIES: R foot heavily bandaged ; no edema, cyanosis, or calf tenderness  NERVOUS SYSTEM: answers questions and follows commands appropriately    LABS:                        15.9   7.75  )-----------( 149      ( 18 Apr 2019 09:37 )             47.3     CBC Full  -  ( 18 Apr 2019 09:37 )  WBC Count : 7.75 K/uL  Hemoglobin : 15.9 g/dL  Hematocrit : 47.3 %  Platelet Count - Automated : 149 K/uL  Mean Cell Volume : 90.1 fl  Mean Cell Hemoglobin : 30.3 pg  Mean Cell Hemoglobin Concentration : 33.6 gm/dL  Auto Neutrophil # : x  Auto Lymphocyte # : x  Auto Monocyte # : x  Auto Eosinophil # : x  Auto Basophil # : x  Auto Neutrophil % : x  Auto Lymphocyte % : x  Auto Monocyte % : x  Auto Eosinophil % : x  Auto Basophil % : x    18 Apr 2019 09:37    139    |  106    |  17     ----------------------------<  104    4.8     |  29     |  1.40     Ca    8.9        18 Apr 2019 09:37          CAPILLARY BLOOD GLUCOSE            Culture - Tissue with Gram Stain (collected 04-17-19 @ 19:44)  Source: .Tissue Other, Right third metatarsal head  Gram Stain (04-18-19 @ 04:05):    No polymorphonuclear cells seen seen    No organisms seen    Culture - Blood (collected 04-16-19 @ 16:41)  Source: .Blood Blood-Peripheral  Preliminary Report (04-17-19 @ 17:02):    No growth to date.    Culture - Blood (collected 04-16-19 @ 16:41)  Source: .Blood Blood-Peripheral  Preliminary Report (04-17-19 @ 17:02):    No growth to date.    Culture - Other (collected 04-15-19 @ 22:34)  Source: .Other Right Plantar Foot  Final Report (04-17-19 @ 16:30):    Few Coag Negative Staphylococcus "Susceptibilities not performed"    Few Corynebacterium species "Susceptibilities not performed"        RADIOLOGY & ADDITIONAL TESTS:    Personally reviewed.     Consultant(s) Notes Reviewed:  [x] YES  [ ] NO

## 2019-04-18 NOTE — PROGRESS NOTE ADULT - ASSESSMENT
68m recent dx of first episode of.  After resection of a melanoma, in the rr he was noted to be in asymptomatic AF.  He was anticoagulated, rate controlled.  Echo unremarkable.  Noted to have brief wct and so had a nuclear stress, which revealed a small area of inferolateral ischemia, managed conservatively.  YANICK DCCV was performed, and he was dc in sr.  He was seen in the office afterward and was told to return in 3 months.    He now comes because of concerns about osteo of the plantar surface of the right foot.  He is planned for debridement.  Preop consultation is being obtained.    He reports being active at baseline, without sxs of angina, heart failure or arrhythmia.    - No evidence of acute ischemia.  Small, mild defect in the mid to distal infero-lateral wall in his Nuclear Stress Test on 3/17/19  - No evidence of significant arrhythmia or volume overload. Normal lvef.  - He remains in NSR following the DCCV last month  - resume Eliquis when ok with ortho  - Continue BB    - Continue lisinopril, may increase for better BP control  - Continue statin    - DVT prophylaxis with PAS for now  - Monitor electrolytes, keep k>4, Mg>2  - Will follow

## 2019-04-18 NOTE — PROGRESS NOTE ADULT - PROBLEM SELECTOR PLAN 2
-Recently diagnosed Afib at [facility] and has been in NSR since DCCV at that time  -c/w Eliquis  -Continue metoprolol for rate control  -cardio recs appreciated

## 2019-04-18 NOTE — PROGRESS NOTE ADULT - PROBLEM SELECTOR PLAN 1
-MRI with concern for OM in 3rd metatarsal   -pt and podiatry Dr. Mcrae, discussed and agreed to pursue surgical resection of infected-appearing bone, which pt had 4/17  -ID recs appreciated -- de-escalated Abx to Ancef for now  -no sign of systemic infection currently   -BCx negative  -f/up wound cultures and pathology  -pain control   -phys therapy assessed pt -- no Phys Therapy needs  -weight-bearing as tolerated with surgical shoe as per podiatry

## 2019-04-18 NOTE — PROGRESS NOTE ADULT - ASSESSMENT
Patient is a 68y old  Male who presents with a chief complaint of right foot infected DFU with underlying osteomyelitis (16 Apr 2019 12:41) s/p right foot 3rd met head resection

## 2019-04-18 NOTE — PROGRESS NOTE ADULT - ASSESSMENT
67yo M w/ PMH of afib (recent diagnosis at [facility] 3/2019, on metoprolol and Eliquis), HTN, history of R arm clot s/p PICC line 7 years prior, multiple surgeries including laminectomy that led to bilateral feet neuropathy, and multiple feet surgeries, sent to the ED by Dr. Mcrae for probable surgery 2/2 to R plantar surface wound for suspected osteomyelitis.

## 2019-04-19 ENCOUNTER — TRANSCRIPTION ENCOUNTER (OUTPATIENT)
Age: 69
End: 2019-04-19

## 2019-04-19 VITALS
SYSTOLIC BLOOD PRESSURE: 129 MMHG | TEMPERATURE: 98 F | HEART RATE: 63 BPM | RESPIRATION RATE: 16 BRPM | DIASTOLIC BLOOD PRESSURE: 75 MMHG | OXYGEN SATURATION: 96 %

## 2019-04-19 LAB
ANION GAP SERPL CALC-SCNC: 5 MMOL/L — SIGNIFICANT CHANGE UP (ref 5–17)
BUN SERPL-MCNC: 14 MG/DL — SIGNIFICANT CHANGE UP (ref 7–23)
CALCIUM SERPL-MCNC: 8.5 MG/DL — SIGNIFICANT CHANGE UP (ref 8.5–10.1)
CHLORIDE SERPL-SCNC: 107 MMOL/L — SIGNIFICANT CHANGE UP (ref 96–108)
CO2 SERPL-SCNC: 28 MMOL/L — SIGNIFICANT CHANGE UP (ref 22–31)
CREAT SERPL-MCNC: 1.1 MG/DL — SIGNIFICANT CHANGE UP (ref 0.5–1.3)
GLUCOSE SERPL-MCNC: 142 MG/DL — HIGH (ref 70–99)
HCT VFR BLD CALC: 43.6 % — SIGNIFICANT CHANGE UP (ref 39–50)
HGB BLD-MCNC: 14.4 G/DL — SIGNIFICANT CHANGE UP (ref 13–17)
MCHC RBC-ENTMCNC: 29.8 PG — SIGNIFICANT CHANGE UP (ref 27–34)
MCHC RBC-ENTMCNC: 33 GM/DL — SIGNIFICANT CHANGE UP (ref 32–36)
MCV RBC AUTO: 90.1 FL — SIGNIFICANT CHANGE UP (ref 80–100)
NRBC # BLD: 0 /100 WBCS — SIGNIFICANT CHANGE UP (ref 0–0)
PLATELET # BLD AUTO: 131 K/UL — LOW (ref 150–400)
POTASSIUM SERPL-MCNC: 4 MMOL/L — SIGNIFICANT CHANGE UP (ref 3.5–5.3)
POTASSIUM SERPL-SCNC: 4 MMOL/L — SIGNIFICANT CHANGE UP (ref 3.5–5.3)
RBC # BLD: 4.84 M/UL — SIGNIFICANT CHANGE UP (ref 4.2–5.8)
RBC # FLD: 13.2 % — SIGNIFICANT CHANGE UP (ref 10.3–14.5)
SODIUM SERPL-SCNC: 140 MMOL/L — SIGNIFICANT CHANGE UP (ref 135–145)
SURGICAL PATHOLOGY STUDY: SIGNIFICANT CHANGE UP
WBC # BLD: 5.62 K/UL — SIGNIFICANT CHANGE UP (ref 3.8–10.5)
WBC # FLD AUTO: 5.62 K/UL — SIGNIFICANT CHANGE UP (ref 3.8–10.5)

## 2019-04-19 PROCEDURE — 80048 BASIC METABOLIC PNL TOTAL CA: CPT

## 2019-04-19 PROCEDURE — 85610 PROTHROMBIN TIME: CPT

## 2019-04-19 PROCEDURE — 88311 DECALCIFY TISSUE: CPT

## 2019-04-19 PROCEDURE — 87040 BLOOD CULTURE FOR BACTERIA: CPT

## 2019-04-19 PROCEDURE — 87070 CULTURE OTHR SPECIMN AEROBIC: CPT

## 2019-04-19 PROCEDURE — 86900 BLOOD TYPING SEROLOGIC ABO: CPT

## 2019-04-19 PROCEDURE — 99232 SBSQ HOSP IP/OBS MODERATE 35: CPT

## 2019-04-19 PROCEDURE — 80053 COMPREHEN METABOLIC PANEL: CPT

## 2019-04-19 PROCEDURE — 71046 X-RAY EXAM CHEST 2 VIEWS: CPT

## 2019-04-19 PROCEDURE — G0463: CPT

## 2019-04-19 PROCEDURE — 97161 PT EVAL LOW COMPLEX 20 MIN: CPT

## 2019-04-19 PROCEDURE — 87186 SC STD MICRODIL/AGAR DIL: CPT

## 2019-04-19 PROCEDURE — 99285 EMERGENCY DEPT VISIT HI MDM: CPT | Mod: 25

## 2019-04-19 PROCEDURE — 84134 ASSAY OF PREALBUMIN: CPT

## 2019-04-19 PROCEDURE — 88304 TISSUE EXAM BY PATHOLOGIST: CPT

## 2019-04-19 PROCEDURE — 36415 COLL VENOUS BLD VENIPUNCTURE: CPT

## 2019-04-19 PROCEDURE — A9579: CPT

## 2019-04-19 PROCEDURE — 87075 CULTR BACTERIA EXCEPT BLOOD: CPT

## 2019-04-19 PROCEDURE — 85730 THROMBOPLASTIN TIME PARTIAL: CPT

## 2019-04-19 PROCEDURE — 99239 HOSP IP/OBS DSCHRG MGMT >30: CPT

## 2019-04-19 PROCEDURE — 73630 X-RAY EXAM OF FOOT: CPT

## 2019-04-19 PROCEDURE — 85027 COMPLETE CBC AUTOMATED: CPT

## 2019-04-19 PROCEDURE — 85652 RBC SED RATE AUTOMATED: CPT

## 2019-04-19 PROCEDURE — 93005 ELECTROCARDIOGRAM TRACING: CPT

## 2019-04-19 PROCEDURE — 86850 RBC ANTIBODY SCREEN: CPT

## 2019-04-19 PROCEDURE — 86901 BLOOD TYPING SEROLOGIC RH(D): CPT

## 2019-04-19 PROCEDURE — 83605 ASSAY OF LACTIC ACID: CPT

## 2019-04-19 PROCEDURE — 73720 MRI LWR EXTREMITY W/O&W/DYE: CPT

## 2019-04-19 RX ORDER — LISINOPRIL 2.5 MG/1
10 TABLET ORAL DAILY
Qty: 0 | Refills: 0 | Status: DISCONTINUED | OUTPATIENT
Start: 2019-04-19 | End: 2019-04-19

## 2019-04-19 RX ORDER — CEPHALEXIN 500 MG
1 CAPSULE ORAL
Qty: 28 | Refills: 0
Start: 2019-04-19 | End: 2019-04-25

## 2019-04-19 RX ADMIN — LISINOPRIL 10 MILLIGRAM(S): 2.5 TABLET ORAL at 12:03

## 2019-04-19 RX ADMIN — Medication 100 MILLIGRAM(S): at 05:51

## 2019-04-19 RX ADMIN — APIXABAN 5 MILLIGRAM(S): 2.5 TABLET, FILM COATED ORAL at 05:51

## 2019-04-19 RX ADMIN — LISINOPRIL 5 MILLIGRAM(S): 2.5 TABLET ORAL at 05:51

## 2019-04-19 RX ADMIN — Medication 100 MILLIGRAM(S): at 13:10

## 2019-04-19 NOTE — PROGRESS NOTE ADULT - REASON FOR ADMISSION
r/o osteomyelitis
R foot ulcer
R foot ulcer

## 2019-04-19 NOTE — PROGRESS NOTE ADULT - ATTENDING COMMENTS
I saw and examined the patient personally. Spoke with above provider regarding this case. I reviewed the above findings completely.  I agree with the above history, physical, and plan which I have edited where appropriate.
Patient seen and evaluated   Packing pulled   Surgical site cleaned with NS   Applied DSD to surgical site   secured with ACE   Patient to remain weight bearing as tolerated with surgical shoe   f/u OR Path   Patient to continue abx as per ID recommendations   Upon discharge patient to keep dressings clean dry and intact until follow up with Dr Mcrae in wound care center within one week
Patient seen and evaluated   Surgical site cleaned with wound cleanser and dressed with DSD   f/u OR Path   Patient is podiatrically stable for discharge with ID abx recommendation   Patient is to keep dressings clean dry and intact upon discharge until follow up with wound care clinic within one week   Patient to weight bear in surgical shoe   Patient is podiatrically stable for discharge at this time
Chart reviewed    Patient seen and examined    Agree with plan as outlined above
I personally saw and examined the patient in detail.  I have spoken to the above provider regarding the assessment and plan of care.  I reviewed the above assessment and plan of care, and agree.  I have made changes in the body of the note where appropriate.
Note written by attending, see above.  Time spent: 45min. More than 50% of the visit was spent counseling the patient on his condition - osteomyelitis, cultures/pathology.
Note written by attending, see above.  Time spent: 45min. More than 50% of the visit was spent counseling the patient on his condition - osteomyelitis, cultures/pathology.

## 2019-04-19 NOTE — PROGRESS NOTE ADULT - SUBJECTIVE AND OBJECTIVE BOX
Patient is a 68y old  Male who presents with a chief complaint of r/o osteomyelitis of right foot 3rd digit  (16 Apr 2019 12:41)    HPI:  67 yo M PMH afib (recent diagnosis at [facility] 3/2019, on metoprolol and Eliquis), HTN, history of R arm clot s/p picc line 7 years prior, multiple surgeries including laminectomy that led to bilateral feet neuropathy, and multiple feet surgeries, seen at bedside during podiatry am rounds with Dr Mcrae. Patient is s/p right foot 3rd digit partial amputation. Patient denies pain or discomfort at this time. Patient denies N/F/V/SOB/CP or calf pain at this time. Patient expresses the need to be discharged today and will follow up within one week with wound care.     ICU Vital Signs Last 24 Hrs  T(C): 36.9 (19 Apr 2019 04:51), Max: 37 (18 Apr 2019 20:41)  T(F): 98.4 (19 Apr 2019 04:51), Max: 98.6 (18 Apr 2019 20:41)  HR: 55 (19 Apr 2019 04:51) (55 - 66)  BP: 135/78 (19 Apr 2019 04:51) (132/75 - 149/81)  BP(mean): --  ABP: --  ABP(mean): --  RR: 16 (19 Apr 2019 04:51) (16 - 18)  SpO2: 94% (19 Apr 2019 04:51) (94% - 96%)                          14.4   5.62  )-----------( 131      ( 19 Apr 2019 09:27 )             43.6   04-19    140  |  107  |  14  ----------------------------<  142<H>  4.0   |  28  |  1.10    Ca    8.5      19 Apr 2019 09:27    MEDICATIONS  (STANDING):  apixaban 5 milliGRAM(s) Oral every 12 hours  ceFAZolin   IVPB 1000 milliGRAM(s) IV Intermittent every 8 hours  lisinopril 5 milliGRAM(s) Oral daily  metoprolol succinate  milliGRAM(s) Oral daily  simvastatin 10 milliGRAM(s) Oral at bedtime    MEDICATIONS  (PRN):  acetaminophen   Tablet .. 650 milliGRAM(s) Oral every 6 hours PRN Moderate Pain (4 - 6)  oxyCODONE    5 mG/acetaminophen 325 mG 1 Tablet(s) Oral every 6 hours PRN Severe Pain (7 - 10)      EXAM: FOOT RIGHT (MINIMUM 3 VIEWS)       PROCEDURE DATE: 04/16/2019         INTERPRETATION: Right foot. Patient has local pain and swelling for 6 weeks   with cellulitis.     Again noted is amputation of the distal aspect of the second metatarsal and   the proximal aspect of the proximal phalanx of the second toe.. Fixation of   the IP joint of the great toe again seen.     There has been interim amputation of the distal aspect of the first   metatarsal compared to May 28, 2013.     There is an inferior calcaneal spur and dorsal tarsal degenerative change.     No bone destruction or fracture is evident on standard film.     IMPRESSION: Prior amputations. No acute finding on standard film.                 JUDE MARTINEZ M.D., ATTENDING RADIOLOGIST   This document has been electronically signed. Apr 16 2019 11:48AM     OR Cx: No growth       Objective   Vascular: DP/PT palpable, CFT<3, temp gradient warm to cool, pedal hair absent, +2 localized edema to right lower extremity no varicosities presents   Derm: Right foot plantar sub met packing pulled,  wound measures [id number] probe to bone no mal odor, no maceration right foot dorsal surgical site sutures intact no dehiscences no drainage mild periwound erythema,   Musc: Hammertoes 2-5 right foot, flat foot right foot, no pain upon palpation of right plantar ulcer no calf pain   Nuero: Epicritic sensation absent

## 2019-04-19 NOTE — PROGRESS NOTE ADULT - PROVIDER SPECIALTY LIST ADULT
Cardiology
Hospitalist
Hospitalist
Infectious Disease
Podiatry
Podiatry
Anesthesia

## 2019-04-19 NOTE — DISCHARGE NOTE PROVIDER - CARE PROVIDER_API CALL
Celestine Mcrae (DPM)  Podiatric Medicine and Surgery  [address] [place] [address]  Phone: [phone]  Fax: [phone]  Follow Up Time:

## 2019-04-19 NOTE — DISCHARGE NOTE NURSING/CASE MANAGEMENT/SOCIAL WORK - NSDCDPATPORTLINK_GEN_ALL_CORE
You can access the [facility] Patient Portal, offered by [facility], by registering with the following website: [email or web address]

## 2019-04-19 NOTE — PROGRESS NOTE ADULT - ASSESSMENT
68m recent dx of first episode of.  After resection of a melanoma, in the rr he was noted to be in asymptomatic AF.  He was anticoagulated, rate controlled.  Echo unremarkable.  Noted to have brief wct and so had a nuclear stress, which revealed a small area of inferolateral ischemia, managed conservatively.  YANICK DCCV was performed, and he was dc in sr.  He was seen in the office afterward and was told to return in 3 months.    He now comes because of concerns about osteo of the plantar surface of the right foot.  He is planned for debridement.  Preop consultation is being obtained.    He reports being active at baseline, without sxs of angina, heart failure or arrhythmia.    - No evidence of acute ischemia.  Small, mild defect in the mid to distal infero-lateral wall in his Nuclear Stress Test on 3/17/19  - No evidence of significant arrhythmia or volume overload. Normal lvef.  - He remains in NSR following the DCCV last month  - resume Eliquis when ok with ortho  - Continue BB    - Increased lisinopril 10 mg daily with hold parameters   - Continue statin    - DVT prophylaxis with PAS for now  - Monitor electrolytes, keep k>4, Mg>2  - Will follow   Panda Peter [facility]  Cardiology

## 2019-04-19 NOTE — PROGRESS NOTE ADULT - SUBJECTIVE AND OBJECTIVE BOX
[facility] patient examined and chart reviewed.    INTERVAL HPI/ OVERNIGHT EVENTS:   No events. AFebrile.    PAST MEDICAL & SURGICAL HISTORY:  Afib  Melanocytic nevi of left ear  Arthritis: hx of multiple knee surgery  Hypertension  History of skin cancer in adulthood: left ear lobe wide excision 7/2018  S/P foot surgery: right 2007  Ulnar nerve impingement: left 1996  S/P knee surgery: arthroscopy bilat knees - total 3 times - 2000, 2002, 2010  S/P carpal tunnel release: left 1996  S/P laminectomy: 1995      For details regarding the patient's social history, family history, and other miscellaneous elements, please refer the initial infectious diseases consultation and/or the admitting history and physical examination for this admission.      ROS:  CONSTITUTIONAL:  Negative fever or chills  EYES:  Negative  blurry vision or double vision  CARDIOVASCULAR:  Negative for chest pain or palpitations  RESPIRATORY:  Negative for cough, wheezing, or SOB   GASTROINTESTINAL:  Negative for nausea, vomiting, diarrhea, constipation, or abdominal pain  GENITOURINARY:  Negative frequency, urgency or dysuria  NEUROLOGIC:  No headache, confusion, dizziness, lightheadedness  All other systems were reviewed and are negative     Current inpatient medications :    ANTIBIOTICS/RELEVANT:  ceFAZolin   IVPB 1000 milliGRAM(s) IV Intermittent every 8 hours      acetaminophen   Tablet .. 650 milliGRAM(s) Oral every 6 hours PRN  apixaban 5 milliGRAM(s) Oral every 12 hours  lisinopril 10 milliGRAM(s) Oral daily  metoprolol succinate  milliGRAM(s) Oral daily  oxyCODONE    5 mG/acetaminophen 325 mG 1 Tablet(s) Oral every 6 hours PRN  simvastatin 10 milliGRAM(s) Oral at bedtime      Objective:    04-18 @ 07:01  -  04-19 @ 07:00  --------------------------------------------------------  IN: 800 mL / OUT: 0 mL / NET: 800 mL    T(C): 36.4 (04-19-19 @ 12:00), Max: 37 (04-18-19 @ 20:41)  HR: 63 (04-19-19 @ 12:00) (55 - 66)  BP: 129/75 (04-19-19 @ 12:00) (129/75 - 149/81)  RR: 16 (04-19-19 @ 12:00) (16 - 18)  SpO2: 96% (04-19-19 @ 12:00) (94% - 96%)    Physical Exam:  General: no acute distress  Eyes: sclera anicteric, pupils equal and reactive to light  ENMT: buccal mucosa moist, pharynx not injected  Neck: supple, trachea midline  Lungs: clear, no wheeze/rhonchi  Cardiovascular: regular rate and rhythm, S1 S2  Abdomen: soft, nontender, no organomegaly present, bowel sounds normal  Neurological: alert and oriented x3, Cranial Nerves II-XII grossly intact  Skin: no increased ecchymosis/petechiae/purpura  Lymph Nodes: no palpable cervical/supraclavicular lymph nodes enlargements  Extremities: right foot c/d/i    LABS:                          14.4   5.62  )-----------( 131      ( 19 Apr 2019 09:27 )             43.6       04-19    140  |  107  |  14  ----------------------------<  142<H>  4.0   |  28  |  1.10    Ca    8.5      19 Apr 2019 09:27    MICROBIOLOGY:    Culture - Tissue with Gram Stain (collected 17 Apr 2019 19:44)  Source: .Tissue Other, Right third metatarsal head  Gram Stain (18 Apr 2019 04:05):    No polymorphonuclear cells seen seen    No organisms seen  Preliminary Report (18 Apr 2019 21:59):    Insufficient growth-culture reincubated    Culture - Blood (collected 16 Apr 2019 16:41)  Source: .Blood Blood-Peripheral  Preliminary Report (17 Apr 2019 17:02):    No growth to date.    Culture - Blood (collected 16 Apr 2019 16:41)  Source: .Blood Blood-Peripheral  Preliminary Report (17 Apr 2019 17:02):    No growth to date.    RADIOLOGY & ADDITIONAL STUDIES:  EXAM:  MR FOOT WAW IC RT                            PROCEDURE DATE:  04/16/2019      INTERPRETATION:  Clinical information: Right foot third digit wound,   evaluate for osteomyelitis.    MRI of the right forefoot is performed on a 1.5 Traci magnet utilizing   multiplanar multisequence technique pre and post intravenous   administration of 10 cc of Gadavist contrast.    Comparison: Right foot radiograph 4/16/2019.    Magnetic susceptibility artifact related to orthopedic hardware, as well   as motion artifact and inhomogeneous fat saturation degrades image   quality limiting evaluation; some diagnostic information can still be   obtained.    There is magnetic susceptibility artifact related to orthopedic screw at   the great toe.  There has been partial resection of the first metatarsal.    There is a plantar skin and subcutaneous ulcer, with a fluid-filled tract   to the plantar surface of the third metatarsal head.  There is dislocation at the third MTP joint, with dorsal displacement of   the third toe.  There is enhancing bone marrow edema involving the third metatarsal head,   neck and shaft of the metatarsal extending to the proximal aspect of the   third metatarsal. This demonstrates T1 signal hypointensity, and is   compatible with osteomyelitis.    There has been partial resection surrounding the second metatarsal   phalangeal joint. There is possible bone marrow edema involving the base   of the residual proximal phalanx of the second toe. This could reflect   reactive edema, cannot exclude infection/osteomyelitis.    There is flattening with subchondral signal intensity and surrounding   marrow edema involving the fourth metatarsal head, finding most   compatible with subchondral insufficiency fracture and/or osteonecrosis.    There are degenerative changes at the third through fifth tarsometatarsal   articulations with subchondral cystic erosion and reactive bone marrow   edema.    Impression:    Limited study.    Dislocation at the third metatarsophalangeal joint, with adjacent plantar   ulcer at the third metatarsal head and bone marrow edema compatible with   osteomyelitis involving the third metatarsal as discussed above.      Assessment :   69 yo M PMH afib HTN, laminectomy complicated by bilateral feet neuropathy, and multiple feet surgeries admitted with nonhealing right 3rd submetatarsal nonhealing ulcer. MRI showed osteomyelitis. Pt is sp right 3rd submetatarsal resection 4/17/19  LAZARO  Overall stable    Plan :   Cont Ancef  Fu OR path and culture  Further recommendation pending OR path and culture    D/w DR Cooper    Continue with present regiment.  Appropriate use of antibiotics and adverse effects reviewed.      I have discussed the above plan of care with patient/ family in detail. They expressed understanding of the  treatment plan . Risks, benefits and alternatives discussed in detail. I have asked if they have any questions or concerns and appropriately addressed them to the best of my ability .    > 35 minutes were spent in direct patient care reviewing notes, medications ,labs data/ imaging , discussion with multidisciplinary team.    Thank you for allowing me to participate in care of your patient .    Allyson Frye MD  Infectious Disease  [phone]

## 2019-04-19 NOTE — DISCHARGE NOTE PROVIDER - HOSPITAL COURSE
67yo M w/ PMH of afib (recent diagnosis at [facility] 3/2019, on metoprolol and Eliquis), HTN, history of R arm clot s/p PICC line 7 years prior, multiple surgeries including laminectomy that led to bilateral feet neuropathy, and multiple feet surgeries, sent to the ED by Dr. Mcrae for probable surgery 2/2 to R plantar surface wound for suspected osteomyelitis.     MRI showed OM, had resection of infected bone with podiatry. Path showed chronic OM in resected bone and clean margin did not have OM. No sign of systemic infection. 67yo M w/ PMH of afib (recent diagnosis at [facility] 3/2019, on metoprolol and Eliquis), HTN, history of R arm clot s/p PICC line 7 years prior, multiple surgeries including laminectomy that led to bilateral feet neuropathy, and multiple feet surgeries, sent to the ED by Dr. Mcrae for probable surgery 2/2 to R plantar surface wound for suspected osteomyelitis.     MRI of the right foot showed OM  involving the third metatarsal. Pt had resection of infected bone with podiatry (Dr. Mcrae). Path showed chronic OM in resected bone and the clean margin did not have OM. No sign of systemic infection. No leukocytosis or fever. Pt's pain was controlled with Tylenol and percocet. Pt was able to bear weight as per podiatry and walked well with surgical shoe -- no phys therapy needs. ID (Uday) consulted on the pt during admission. Culture of the would earlier this month had only revealed coag neg staph and corynebacterium. Intra-op culture had no significant growth for 2 days and was replated to eval for growth. ID recommended to discharge pt with a week of Keflex and to f/up with wound care center.     Pt felt well and was discharged to home.        On day of discharge:    ROS: right foot pain is controlled with analgesics and overall improving since surgery; Denies fever, chills, diarrhea, abd pain, SOB, CP.    Vital Signs Last 24 Hrs    T(C): 36.4 (19 Apr 2019 12:00), Max: 36.9 (19 Apr 2019 04:51)    T(F): 97.6 (19 Apr 2019 12:00), Max: 98.4 (19 Apr 2019 04:51)    HR: 63 (19 Apr 2019 12:00) (55 - 63)    BP: 129/75 (19 Apr 2019 12:00) (129/75 - 135/78)    BP(mean): --    RR: 16 (19 Apr 2019 12:00) (16 - 16)    SpO2: 96% (19 Apr 2019 12:00) (94% - 96%)        PHYSICAL EXAM:    GENERAL: NAD    HEENT:  anicteric, moist mucous membranes    CHEST/LUNG:  CTA b/l, no rales, wheezes, or rhonchi    HEART:  RRR, S1, S2    ABDOMEN:  BS+, soft, nontender, nondistended    EXTREMITIES: R foot heavily bandaged ; no edema, cyanosis, or calf tenderness    NERVOUS SYSTEM: answers questions and follows commands appropriately        Time spent: 45min

## 2019-04-19 NOTE — DISCHARGE NOTE PROVIDER - NSDCCPCAREPLAN_GEN_ALL_CORE_FT
PRINCIPAL DISCHARGE DIAGNOSIS  Diagnosis: Osteomyelitis of foot  Assessment and Plan of Treatment: You had an infection in the bone in your right foot. That was removed surgically by poditry and there is no sign of remaining infection in the bone. Please comlpete 1 week of antibiotics, Keflex, as prescribed starting this evening at ~6PM. Follow up with podiatrist, Dr. Mcrae (number below), in the wound care center on Monday, 4/22/19.   Please keep dressings clean, dry and intact upon discharge until follow up with wound care clinic.  You can walk on the foot with the surgical shoe, but try not to put too much strain on the foot for now.   If you have new symptoms like significant increase in pain or fever, call your doctor or return to the ER.      SECONDARY DISCHARGE DIAGNOSES  Diagnosis: HTN (hypertension)  Assessment and Plan of Treatment: Continue your home regimen and follow up with your PMD.    Diagnosis: Atrial fibrillation  Assessment and Plan of Treatment: Continue your home Eliquis and metoprolol and follow up with your cardiologist. You did not have any return of atrial fibrillation while you were in the hospital.

## 2019-04-19 NOTE — PROGRESS NOTE ADULT - SUBJECTIVE AND OBJECTIVE BOX
[facility] -- Karla Reed, Fredis Graham, Thomas Echevarria Savella  Office # [phone]      Follow Up:    post op, PAF    Subjective/Observations:   No events overnight resting comfortably in bed.  No complaints of chest pain, dyspnea, or palpitations reported. No signs of orthopnea or PND.     REVIEW OF SYSTEMS: All other review of systems is negative unless indicated above    PAST MEDICAL & SURGICAL HISTORY:  Afib  Melanocytic nevi of left ear  Arthritis: hx of multiple knee surgery  Hypertension  History of skin cancer in adulthood: left ear lobe wide excision 7/2018  S/P foot surgery: right 2007  Ulnar nerve impingement: left 1996  S/P knee surgery: arthroscopy bilat knees - total 3 times - 2000, 2002, 2010  S/P carpal tunnel release: left 1996  S/P laminectomy: 1995      MEDICATIONS  (STANDING):  apixaban 5 milliGRAM(s) Oral every 12 hours  ceFAZolin   IVPB 1000 milliGRAM(s) IV Intermittent every 8 hours  lisinopril 5 milliGRAM(s) Oral daily  metoprolol succinate  milliGRAM(s) Oral daily  simvastatin 10 milliGRAM(s) Oral at bedtime    MEDICATIONS  (PRN):  acetaminophen   Tablet .. 650 milliGRAM(s) Oral every 6 hours PRN Moderate Pain (4 - 6)  oxyCODONE    5 mG/acetaminophen 325 mG 1 Tablet(s) Oral every 6 hours PRN Severe Pain (7 - 10)      Allergies    No Known Allergies    Intolerances        Vital Signs Last 24 Hrs  T(C): 36.9 (19 Apr 2019 04:51), Max: 37 (18 Apr 2019 20:41)  T(F): 98.4 (19 Apr 2019 04:51), Max: 98.6 (18 Apr 2019 20:41)  HR: 55 (19 Apr 2019 04:51) (55 - 66)  BP: 135/78 (19 Apr 2019 04:51) (132/75 - 149/81)  BP(mean): --  RR: 16 (19 Apr 2019 04:51) (16 - 18)  SpO2: 94% (19 Apr 2019 04:51) (94% - 96%)    I&O's Summary    18 Apr 2019 07:01  -  19 Apr 2019 07:00  --------------------------------------------------------  IN: 800 mL / OUT: 0 mL / NET: 800 mL          PHYSICAL EXAM:  TELE: Off tele   Constitutional: NAD, awake and alert, well-developed  HEENT: Moist Mucous Membranes, Anicteric  Pulmonary: Non-labored, breath sounds are clear bilaterally, No wheezing, crackles or rhonchi  Cardiovascular: Regular, S1 and S2 nl, No murmurs, rubs, gallops or clicks  Gastrointestinal: Bowel Sounds present, soft, nontender.   Lymph: No lymphadenopathy. No peripheral edema.  Skin: No visible rashes or ulcers.  Psych:  Mood & affect appropriate    LABS: All Labs Reviewed:                        14.4   5.62  )-----------( 131      ( 19 Apr 2019 09:27 )             43.6                         15.9   7.75  )-----------( 149      ( 18 Apr 2019 09:37 )             47.3                         15.3   4.81  )-----------( 127      ( 17 Apr 2019 08:59 )             45.5     19 Apr 2019 09:27    140    |  107    |  14     ----------------------------<  142    4.0     |  28     |  1.10   18 Apr 2019 09:37    139    |  106    |  17     ----------------------------<  104    4.8     |  29     |  1.40   17 Apr 2019 08:59    140    |  108    |  14     ----------------------------<  91     4.0     |  24     |  1.10     Ca    8.5        19 Apr 2019 09:27  Ca    8.9        18 Apr 2019 09:37  Ca    8.8        17 Apr 2019 08:59               ECG:    Echo:    Radiology:           [facility]   Cardiology

## 2019-04-20 LAB
-  AMIKACIN: SIGNIFICANT CHANGE UP
-  AMPICILLIN/SULBACTAM: SIGNIFICANT CHANGE UP
-  AMPICILLIN: SIGNIFICANT CHANGE UP
-  AZTREONAM: SIGNIFICANT CHANGE UP
-  CEFAZOLIN: SIGNIFICANT CHANGE UP
-  CEFEPIME: SIGNIFICANT CHANGE UP
-  CEFOXITIN: SIGNIFICANT CHANGE UP
-  CEFTRIAXONE: SIGNIFICANT CHANGE UP
-  CIPROFLOXACIN: SIGNIFICANT CHANGE UP
-  ERTAPENEM: SIGNIFICANT CHANGE UP
-  GENTAMICIN: SIGNIFICANT CHANGE UP
-  IMIPENEM: SIGNIFICANT CHANGE UP
-  LEVOFLOXACIN: SIGNIFICANT CHANGE UP
-  MEROPENEM: SIGNIFICANT CHANGE UP
-  PIPERACILLIN/TAZOBACTAM: SIGNIFICANT CHANGE UP
-  TOBRAMYCIN: SIGNIFICANT CHANGE UP
-  TRIMETHOPRIM/SULFAMETHOXAZOLE: SIGNIFICANT CHANGE UP
METHOD TYPE: SIGNIFICANT CHANGE UP

## 2019-04-21 LAB
-  AMIKACIN: SIGNIFICANT CHANGE UP
-  AZTREONAM: SIGNIFICANT CHANGE UP
-  CEFEPIME: SIGNIFICANT CHANGE UP
-  CEFTRIAXONE: SIGNIFICANT CHANGE UP
-  CIPROFLOXACIN: SIGNIFICANT CHANGE UP
-  GENTAMICIN: SIGNIFICANT CHANGE UP
-  LEVOFLOXACIN: SIGNIFICANT CHANGE UP
-  MEROPENEM: SIGNIFICANT CHANGE UP
-  PIPERACILLIN/TAZOBACTAM: SIGNIFICANT CHANGE UP
-  TOBRAMYCIN: SIGNIFICANT CHANGE UP
-  TRIMETHOPRIM/SULFAMETHOXAZOLE: SIGNIFICANT CHANGE UP
CULTURE RESULTS: SIGNIFICANT CHANGE UP
CULTURE RESULTS: SIGNIFICANT CHANGE UP
METHOD TYPE: SIGNIFICANT CHANGE UP
SPECIMEN SOURCE: SIGNIFICANT CHANGE UP
SPECIMEN SOURCE: SIGNIFICANT CHANGE UP

## 2019-04-22 ENCOUNTER — TRANSCRIPTION ENCOUNTER (OUTPATIENT)
Age: 69
End: 2019-04-22

## 2019-04-22 LAB
CULTURE RESULTS: SIGNIFICANT CHANGE UP
ORGANISM # SPEC MICROSCOPIC CNT: SIGNIFICANT CHANGE UP
SPECIMEN SOURCE: SIGNIFICANT CHANGE UP

## 2019-04-24 ENCOUNTER — OUTPATIENT (OUTPATIENT)
Dept: OUTPATIENT SERVICES | Facility: HOSPITAL | Age: 69
LOS: 1 days | Discharge: HOME | End: 2019-04-24
Payer: MEDICARE

## 2019-04-24 DIAGNOSIS — Z85.828 PERSONAL HISTORY OF OTHER MALIGNANT NEOPLASM OF SKIN: Chronic | ICD-10-CM

## 2019-04-24 DIAGNOSIS — L03.115 CELLULITIS OF RIGHT LOWER LIMB: ICD-10-CM

## 2019-04-24 PROCEDURE — G0463: CPT

## 2019-04-28 DIAGNOSIS — I83.93 ASYMPTOMATIC VARICOSE VEINS OF BILATERAL LOWER EXTREMITIES: ICD-10-CM

## 2019-04-28 DIAGNOSIS — G62.9 POLYNEUROPATHY, UNSPECIFIED: ICD-10-CM

## 2019-04-28 DIAGNOSIS — Z87.828 PERSONAL HISTORY OF OTHER (HEALED) PHYSICAL INJURY AND TRAUMA: ICD-10-CM

## 2019-04-28 DIAGNOSIS — I10 ESSENTIAL (PRIMARY) HYPERTENSION: ICD-10-CM

## 2019-04-28 DIAGNOSIS — L89.893 PRESSURE ULCER OF OTHER SITE, STAGE 3: ICD-10-CM

## 2019-04-28 DIAGNOSIS — Z89.422 ACQUIRED ABSENCE OF OTHER LEFT TOE(S): ICD-10-CM

## 2019-04-28 DIAGNOSIS — M86.671 OTHER CHRONIC OSTEOMYELITIS, RIGHT ANKLE AND FOOT: ICD-10-CM

## 2019-04-28 DIAGNOSIS — E66.3 OVERWEIGHT: ICD-10-CM

## 2019-04-28 DIAGNOSIS — M12.9 ARTHROPATHY, UNSPECIFIED: ICD-10-CM

## 2019-04-28 DIAGNOSIS — I48.91 UNSPECIFIED ATRIAL FIBRILLATION: ICD-10-CM

## 2019-04-28 DIAGNOSIS — Z79.899 OTHER LONG TERM (CURRENT) DRUG THERAPY: ICD-10-CM

## 2019-04-29 ENCOUNTER — OUTPATIENT (OUTPATIENT)
Dept: OUTPATIENT SERVICES | Facility: HOSPITAL | Age: 69
LOS: 1 days | Discharge: HOME | End: 2019-04-29
Payer: MEDICARE

## 2019-04-29 DIAGNOSIS — L03.115 CELLULITIS OF RIGHT LOWER LIMB: ICD-10-CM

## 2019-04-29 DIAGNOSIS — Z85.828 PERSONAL HISTORY OF OTHER MALIGNANT NEOPLASM OF SKIN: Chronic | ICD-10-CM

## 2019-04-29 PROCEDURE — G0463: CPT

## 2019-04-30 ENCOUNTER — OUTPATIENT (OUTPATIENT)
Dept: OUTPATIENT SERVICES | Facility: HOSPITAL | Age: 69
LOS: 1 days | Discharge: HOME | End: 2019-04-30
Payer: MEDICARE

## 2019-04-30 DIAGNOSIS — E11.621 TYPE 2 DIABETES MELLITUS WITH FOOT ULCER: ICD-10-CM

## 2019-04-30 DIAGNOSIS — Z85.828 PERSONAL HISTORY OF OTHER MALIGNANT NEOPLASM OF SKIN: Chronic | ICD-10-CM

## 2019-04-30 PROCEDURE — G0463: CPT

## 2019-05-01 DIAGNOSIS — M86.671 OTHER CHRONIC OSTEOMYELITIS, RIGHT ANKLE AND FOOT: ICD-10-CM

## 2019-05-01 DIAGNOSIS — Z48.02 ENCOUNTER FOR REMOVAL OF SUTURES: ICD-10-CM

## 2019-05-01 DIAGNOSIS — L89.893 PRESSURE ULCER OF OTHER SITE, STAGE 3: ICD-10-CM

## 2019-05-01 DIAGNOSIS — Z79.899 OTHER LONG TERM (CURRENT) DRUG THERAPY: ICD-10-CM

## 2019-05-01 DIAGNOSIS — G62.9 POLYNEUROPATHY, UNSPECIFIED: ICD-10-CM

## 2019-05-01 DIAGNOSIS — M12.9 ARTHROPATHY, UNSPECIFIED: ICD-10-CM

## 2019-05-01 DIAGNOSIS — I83.93 ASYMPTOMATIC VARICOSE VEINS OF BILATERAL LOWER EXTREMITIES: ICD-10-CM

## 2019-05-01 DIAGNOSIS — E66.3 OVERWEIGHT: ICD-10-CM

## 2019-05-01 DIAGNOSIS — I48.91 UNSPECIFIED ATRIAL FIBRILLATION: ICD-10-CM

## 2019-05-01 DIAGNOSIS — Z87.828 PERSONAL HISTORY OF OTHER (HEALED) PHYSICAL INJURY AND TRAUMA: ICD-10-CM

## 2019-05-01 DIAGNOSIS — Z89.422 ACQUIRED ABSENCE OF OTHER LEFT TOE(S): ICD-10-CM

## 2019-05-01 DIAGNOSIS — I10 ESSENTIAL (PRIMARY) HYPERTENSION: ICD-10-CM

## 2019-05-05 DIAGNOSIS — M86.671 OTHER CHRONIC OSTEOMYELITIS, RIGHT ANKLE AND FOOT: ICD-10-CM

## 2019-05-05 DIAGNOSIS — L89.893 PRESSURE ULCER OF OTHER SITE, STAGE 3: ICD-10-CM

## 2019-05-06 ENCOUNTER — OUTPATIENT (OUTPATIENT)
Dept: OUTPATIENT SERVICES | Facility: HOSPITAL | Age: 69
LOS: 1 days | Discharge: HOME | End: 2019-05-06
Payer: MEDICARE

## 2019-05-06 DIAGNOSIS — L03.115 CELLULITIS OF RIGHT LOWER LIMB: ICD-10-CM

## 2019-05-06 DIAGNOSIS — Z85.828 PERSONAL HISTORY OF OTHER MALIGNANT NEOPLASM OF SKIN: Chronic | ICD-10-CM

## 2019-05-06 PROCEDURE — G0463: CPT

## 2019-05-08 ENCOUNTER — OUTPATIENT (OUTPATIENT)
Dept: OUTPATIENT SERVICES | Facility: HOSPITAL | Age: 69
LOS: 1 days | Discharge: HOME | End: 2019-05-08
Payer: MEDICARE

## 2019-05-08 DIAGNOSIS — G62.9 POLYNEUROPATHY, UNSPECIFIED: ICD-10-CM

## 2019-05-08 DIAGNOSIS — Z89.422 ACQUIRED ABSENCE OF OTHER LEFT TOE(S): ICD-10-CM

## 2019-05-08 DIAGNOSIS — T81.31XA DISRUPTION OF EXTERNAL OPERATION (SURGICAL) WOUND, NOT ELSEWHERE CLASSIFIED, INITIAL ENCOUNTER: ICD-10-CM
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
## 2019-05-08 DIAGNOSIS — I83.93 ASYMPTOMATIC VARICOSE VEINS OF BILATERAL LOWER EXTREMITIES: ICD-10-CM

## 2019-05-08 DIAGNOSIS — L84 CORNS AND CALLOSITIES: ICD-10-CM

## 2019-05-08 DIAGNOSIS — M86.671 OTHER CHRONIC OSTEOMYELITIS, RIGHT ANKLE AND FOOT: ICD-10-CM

## 2019-05-08 DIAGNOSIS — Z85.828 PERSONAL HISTORY OF OTHER MALIGNANT NEOPLASM OF SKIN: Chronic | ICD-10-CM

## 2019-05-08 DIAGNOSIS — L03.115 CELLULITIS OF RIGHT LOWER LIMB: ICD-10-CM

## 2019-05-08 DIAGNOSIS — L89.893 PRESSURE ULCER OF OTHER SITE, STAGE 3: ICD-10-CM

## 2019-05-08 DIAGNOSIS — I48.91 UNSPECIFIED ATRIAL FIBRILLATION: ICD-10-CM

## 2019-05-08 DIAGNOSIS — I10 ESSENTIAL (PRIMARY) HYPERTENSION: ICD-10-CM

## 2019-05-08 DIAGNOSIS — Z79.899 OTHER LONG TERM (CURRENT) DRUG THERAPY: ICD-10-CM

## 2019-05-08 DIAGNOSIS — E66.3 OVERWEIGHT: ICD-10-CM

## 2019-05-08 DIAGNOSIS — M12.9 ARTHROPATHY, UNSPECIFIED: ICD-10-CM

## 2019-05-08 DIAGNOSIS — Z87.828 PERSONAL HISTORY OF OTHER (HEALED) PHYSICAL INJURY AND TRAUMA: ICD-10-CM

## 2019-05-08 PROCEDURE — 12020 TX SUPFC WND DEHSN SMPL CLSR: CPT

## 2019-05-10 ENCOUNTER — OUTPATIENT (OUTPATIENT)
Dept: OUTPATIENT SERVICES | Facility: HOSPITAL | Age: 69
LOS: 1 days | Discharge: HOME | End: 2019-05-10
Payer: MEDICARE

## 2019-05-10 DIAGNOSIS — Z85.828 PERSONAL HISTORY OF OTHER MALIGNANT NEOPLASM OF SKIN: Chronic | ICD-10-CM

## 2019-05-10 DIAGNOSIS — L03.115 CELLULITIS OF RIGHT LOWER LIMB: ICD-10-CM

## 2019-05-10 PROCEDURE — G0463: CPT
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
## 2019-05-11 DIAGNOSIS — T81.31XD DISRUPTION OF EXTERNAL OPERATION (SURGICAL) WOUND, NOT ELSEWHERE CLASSIFIED, SUBSEQUENT ENCOUNTER: ICD-10-CM

## 2019-05-11 DIAGNOSIS — M86.671 OTHER CHRONIC OSTEOMYELITIS, RIGHT ANKLE AND FOOT: ICD-10-CM

## 2019-05-12 DIAGNOSIS — I10 ESSENTIAL (PRIMARY) HYPERTENSION: ICD-10-CM

## 2019-05-12 DIAGNOSIS — M86.671 OTHER CHRONIC OSTEOMYELITIS, RIGHT ANKLE AND FOOT: ICD-10-CM

## 2019-05-12 DIAGNOSIS — G62.9 POLYNEUROPATHY, UNSPECIFIED: ICD-10-CM

## 2019-05-12 DIAGNOSIS — L84 CORNS AND CALLOSITIES: ICD-10-CM

## 2019-05-12 DIAGNOSIS — Z79.899 OTHER LONG TERM (CURRENT) DRUG THERAPY: ICD-10-CM
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
## 2019-05-12 DIAGNOSIS — T81.31XA DISRUPTION OF EXTERNAL OPERATION (SURGICAL) WOUND, NOT ELSEWHERE CLASSIFIED, INITIAL ENCOUNTER: ICD-10-CM

## 2019-05-12 DIAGNOSIS — M12.9 ARTHROPATHY, UNSPECIFIED: ICD-10-CM

## 2019-05-12 DIAGNOSIS — Z89.422 ACQUIRED ABSENCE OF OTHER LEFT TOE(S): ICD-10-CM

## 2019-05-12 DIAGNOSIS — L89.893 PRESSURE ULCER OF OTHER SITE, STAGE 3: ICD-10-CM

## 2019-05-12 DIAGNOSIS — I83.93 ASYMPTOMATIC VARICOSE VEINS OF BILATERAL LOWER EXTREMITIES: ICD-10-CM

## 2019-05-12 DIAGNOSIS — I48.91 UNSPECIFIED ATRIAL FIBRILLATION: ICD-10-CM

## 2019-05-12 DIAGNOSIS — Z87.828 PERSONAL HISTORY OF OTHER (HEALED) PHYSICAL INJURY AND TRAUMA: ICD-10-CM

## 2019-05-12 DIAGNOSIS — E66.3 OVERWEIGHT: ICD-10-CM

## 2019-05-13 ENCOUNTER — APPOINTMENT (OUTPATIENT)
Dept: PODIATRY | Facility: CLINIC | Age: 69
End: 2019-05-13

## 2019-05-13 ENCOUNTER — OUTPATIENT (OUTPATIENT)
Dept: OUTPATIENT SERVICES | Facility: HOSPITAL | Age: 69
LOS: 1 days | Discharge: HOME | End: 2019-05-13
Payer: MEDICARE

## 2019-05-13 DIAGNOSIS — L03.115 CELLULITIS OF RIGHT LOWER LIMB: ICD-10-CM

## 2019-05-13 DIAGNOSIS — Z85.828 PERSONAL HISTORY OF OTHER MALIGNANT NEOPLASM OF SKIN: Chronic | ICD-10-CM

## 2019-05-13 PROCEDURE — G0463: CPT

## 2019-05-13 PROCEDURE — 99213 OFFICE O/P EST LOW 20 MIN: CPT

## 2019-05-14 DIAGNOSIS — T81.31XD DISRUPTION OF EXTERNAL OPERATION (SURGICAL) WOUND, NOT ELSEWHERE CLASSIFIED, SUBSEQUENT ENCOUNTER: ICD-10-CM

## 2019-05-14 DIAGNOSIS — M86.671 OTHER CHRONIC OSTEOMYELITIS, RIGHT ANKLE AND FOOT: ICD-10-CM
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
## 2019-05-14 DIAGNOSIS — L89.893 PRESSURE ULCER OF OTHER SITE, STAGE 3: ICD-10-CM

## 2019-05-15 ENCOUNTER — APPOINTMENT (OUTPATIENT)
Dept: PODIATRY | Facility: CLINIC | Age: 69
End: 2019-05-15
Payer: MEDICARE

## 2019-05-15 ENCOUNTER — OUTPATIENT (OUTPATIENT)
Dept: OUTPATIENT SERVICES | Facility: HOSPITAL | Age: 69
LOS: 1 days | Discharge: HOME | End: 2019-05-15
Payer: MEDICARE

## 2019-05-15 DIAGNOSIS — Z85.828 PERSONAL HISTORY OF OTHER MALIGNANT NEOPLASM OF SKIN: Chronic | ICD-10-CM

## 2019-05-15 DIAGNOSIS — L03.115 CELLULITIS OF RIGHT LOWER LIMB: ICD-10-CM

## 2019-05-15 PROCEDURE — G0463: CPT

## 2019-05-15 PROCEDURE — 99213 OFFICE O/P EST LOW 20 MIN: CPT

## 2019-05-18 DIAGNOSIS — Z89.422 ACQUIRED ABSENCE OF OTHER LEFT TOE(S): ICD-10-CM

## 2019-05-18 DIAGNOSIS — I10 ESSENTIAL (PRIMARY) HYPERTENSION: ICD-10-CM

## 2019-05-18 DIAGNOSIS — E66.3 OVERWEIGHT: ICD-10-CM

## 2019-05-18 DIAGNOSIS — I48.91 UNSPECIFIED ATRIAL FIBRILLATION: ICD-10-CM

## 2019-05-18 DIAGNOSIS — L89.893 PRESSURE ULCER OF OTHER SITE, STAGE 3: ICD-10-CM

## 2019-05-18 DIAGNOSIS — M12.9 ARTHROPATHY, UNSPECIFIED: ICD-10-CM

## 2019-05-18 DIAGNOSIS — Z87.828 PERSONAL HISTORY OF OTHER (HEALED) PHYSICAL INJURY AND TRAUMA: ICD-10-CM

## 2019-05-18 DIAGNOSIS — T81.31XD DISRUPTION OF EXTERNAL OPERATION (SURGICAL) WOUND, NOT ELSEWHERE CLASSIFIED, SUBSEQUENT ENCOUNTER: ICD-10-CM

## 2019-05-18 DIAGNOSIS — G62.9 POLYNEUROPATHY, UNSPECIFIED: ICD-10-CM

## 2019-05-18 DIAGNOSIS — M86.671 OTHER CHRONIC OSTEOMYELITIS, RIGHT ANKLE AND FOOT: ICD-10-CM
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
## 2019-05-18 DIAGNOSIS — I83.93 ASYMPTOMATIC VARICOSE VEINS OF BILATERAL LOWER EXTREMITIES: ICD-10-CM

## 2019-05-18 DIAGNOSIS — Z79.899 OTHER LONG TERM (CURRENT) DRUG THERAPY: ICD-10-CM

## 2019-05-18 DIAGNOSIS — L84 CORNS AND CALLOSITIES: ICD-10-CM

## 2019-05-22 ENCOUNTER — APPOINTMENT (OUTPATIENT)
Dept: PODIATRY | Facility: CLINIC | Age: 69
End: 2019-05-22
Payer: MEDICARE

## 2019-05-22 ENCOUNTER — OUTPATIENT (OUTPATIENT)
Dept: OUTPATIENT SERVICES | Facility: HOSPITAL | Age: 69
LOS: 1 days | Discharge: HOME | End: 2019-05-22
Payer: MEDICARE

## 2019-05-22 DIAGNOSIS — Z85.828 PERSONAL HISTORY OF OTHER MALIGNANT NEOPLASM OF SKIN: Chronic | ICD-10-CM

## 2019-05-22 DIAGNOSIS — L03.115 CELLULITIS OF RIGHT LOWER LIMB: ICD-10-CM

## 2019-05-22 PROCEDURE — 99213 OFFICE O/P EST LOW 20 MIN: CPT

## 2019-05-22 PROCEDURE — G0463: CPT

## 2019-05-25 DIAGNOSIS — I83.93 ASYMPTOMATIC VARICOSE VEINS OF BILATERAL LOWER EXTREMITIES: ICD-10-CM

## 2019-05-25 DIAGNOSIS — E66.3 OVERWEIGHT: ICD-10-CM

## 2019-05-25 DIAGNOSIS — Z79.899 OTHER LONG TERM (CURRENT) DRUG THERAPY: ICD-10-CM

## 2019-05-25 DIAGNOSIS — Z48.02 ENCOUNTER FOR REMOVAL OF SUTURES: ICD-10-CM

## 2019-05-25 DIAGNOSIS — I48.91 UNSPECIFIED ATRIAL FIBRILLATION: ICD-10-CM

## 2019-05-25 DIAGNOSIS — L89.893 PRESSURE ULCER OF OTHER SITE, STAGE 3: ICD-10-CM

## 2019-05-25 DIAGNOSIS — Z87.828 PERSONAL HISTORY OF OTHER (HEALED) PHYSICAL INJURY AND TRAUMA: ICD-10-CM

## 2019-05-25 DIAGNOSIS — M12.9 ARTHROPATHY, UNSPECIFIED: ICD-10-CM

## 2019-05-25 DIAGNOSIS — I10 ESSENTIAL (PRIMARY) HYPERTENSION: ICD-10-CM

## 2019-05-25 DIAGNOSIS — M86.671 OTHER CHRONIC OSTEOMYELITIS, RIGHT ANKLE AND FOOT: ICD-10-CM

## 2019-05-25 DIAGNOSIS — G62.9 POLYNEUROPATHY, UNSPECIFIED: ICD-10-CM

## 2019-05-25 DIAGNOSIS — Z89.422 ACQUIRED ABSENCE OF OTHER LEFT TOE(S): ICD-10-CM

## 2019-05-25 DIAGNOSIS — L84 CORNS AND CALLOSITIES: ICD-10-CM

## 2019-05-29 ENCOUNTER — OUTPATIENT (OUTPATIENT)
Dept: OUTPATIENT SERVICES | Facility: HOSPITAL | Age: 69
LOS: 1 days | Discharge: HOME | End: 2019-05-29
Payer: MEDICARE

## 2019-05-29 ENCOUNTER — APPOINTMENT (OUTPATIENT)
Dept: PODIATRY | Facility: CLINIC | Age: 69
End: 2019-05-29
Payer: MEDICARE

## 2019-05-29 DIAGNOSIS — L03.115 CELLULITIS OF RIGHT LOWER LIMB: ICD-10-CM

## 2019-05-29 DIAGNOSIS — Z85.828 PERSONAL HISTORY OF OTHER MALIGNANT NEOPLASM OF SKIN: Chronic | ICD-10-CM

## 2019-05-29 PROCEDURE — 99213 OFFICE O/P EST LOW 20 MIN: CPT

## 2019-05-29 PROCEDURE — G0463: CPT

## 2019-05-31 DIAGNOSIS — Z79.899 OTHER LONG TERM (CURRENT) DRUG THERAPY: ICD-10-CM

## 2019-05-31 DIAGNOSIS — Z87.828 PERSONAL HISTORY OF OTHER (HEALED) PHYSICAL INJURY AND TRAUMA: ICD-10-CM

## 2019-05-31 DIAGNOSIS — M86.671 OTHER CHRONIC OSTEOMYELITIS, RIGHT ANKLE AND FOOT: ICD-10-CM

## 2019-05-31 DIAGNOSIS — I48.91 UNSPECIFIED ATRIAL FIBRILLATION: ICD-10-CM

## 2019-05-31 DIAGNOSIS — I10 ESSENTIAL (PRIMARY) HYPERTENSION: ICD-10-CM

## 2019-05-31 DIAGNOSIS — M12.9 ARTHROPATHY, UNSPECIFIED: ICD-10-CM

## 2019-05-31 DIAGNOSIS — E66.3 OVERWEIGHT: ICD-10-CM

## 2019-05-31 DIAGNOSIS — Z89.422 ACQUIRED ABSENCE OF OTHER LEFT TOE(S): ICD-10-CM

## 2019-05-31 DIAGNOSIS — G62.9 POLYNEUROPATHY, UNSPECIFIED: ICD-10-CM

## 2019-05-31 DIAGNOSIS — L89.893 PRESSURE ULCER OF OTHER SITE, STAGE 3: ICD-10-CM

## 2019-05-31 DIAGNOSIS — I83.93 ASYMPTOMATIC VARICOSE VEINS OF BILATERAL LOWER EXTREMITIES: ICD-10-CM

## 2019-05-31 DIAGNOSIS — L84 CORNS AND CALLOSITIES: ICD-10-CM

## 2019-06-04 ENCOUNTER — APPOINTMENT (OUTPATIENT)
Dept: PODIATRY | Facility: CLINIC | Age: 69
End: 2019-06-04

## 2019-06-06 ENCOUNTER — APPOINTMENT (OUTPATIENT)
Dept: PLASTIC SURGERY | Facility: CLINIC | Age: 69
End: 2019-06-06
Payer: MEDICARE

## 2019-06-06 ENCOUNTER — OUTPATIENT (OUTPATIENT)
Dept: OUTPATIENT SERVICES | Facility: HOSPITAL | Age: 69
LOS: 1 days | Discharge: HOME | End: 2019-06-06
Payer: MEDICARE

## 2019-06-06 DIAGNOSIS — Z85.828 PERSONAL HISTORY OF OTHER MALIGNANT NEOPLASM OF SKIN: Chronic | ICD-10-CM

## 2019-06-06 DIAGNOSIS — M86.671 OTHER CHRONIC OSTEOMYELITIS, RIGHT ANKLE AND FOOT: ICD-10-CM

## 2019-06-06 PROCEDURE — G0463: CPT

## 2019-06-06 PROCEDURE — 99213 OFFICE O/P EST LOW 20 MIN: CPT

## 2019-06-09 DIAGNOSIS — Z89.422 ACQUIRED ABSENCE OF OTHER LEFT TOE(S): ICD-10-CM

## 2019-06-09 DIAGNOSIS — I48.91 UNSPECIFIED ATRIAL FIBRILLATION: ICD-10-CM

## 2019-06-09 DIAGNOSIS — M12.9 ARTHROPATHY, UNSPECIFIED: ICD-10-CM

## 2019-06-09 DIAGNOSIS — L89.893 PRESSURE ULCER OF OTHER SITE, STAGE 3: ICD-10-CM

## 2019-06-09 DIAGNOSIS — I10 ESSENTIAL (PRIMARY) HYPERTENSION: ICD-10-CM

## 2019-06-09 DIAGNOSIS — I83.93 ASYMPTOMATIC VARICOSE VEINS OF BILATERAL LOWER EXTREMITIES: ICD-10-CM

## 2019-06-09 DIAGNOSIS — E66.3 OVERWEIGHT: ICD-10-CM

## 2019-06-09 DIAGNOSIS — G62.9 POLYNEUROPATHY, UNSPECIFIED: ICD-10-CM

## 2019-06-09 DIAGNOSIS — Z87.828 PERSONAL HISTORY OF OTHER (HEALED) PHYSICAL INJURY AND TRAUMA: ICD-10-CM

## 2019-06-09 DIAGNOSIS — L84 CORNS AND CALLOSITIES: ICD-10-CM

## 2019-06-09 DIAGNOSIS — M86.671 OTHER CHRONIC OSTEOMYELITIS, RIGHT ANKLE AND FOOT: ICD-10-CM

## 2019-06-09 DIAGNOSIS — Z79.899 OTHER LONG TERM (CURRENT) DRUG THERAPY: ICD-10-CM

## 2019-06-11 ENCOUNTER — APPOINTMENT (OUTPATIENT)
Dept: PODIATRY | Facility: CLINIC | Age: 69
End: 2019-06-11
Payer: MEDICARE

## 2019-06-11 ENCOUNTER — OUTPATIENT (OUTPATIENT)
Dept: OUTPATIENT SERVICES | Facility: HOSPITAL | Age: 69
LOS: 1 days | Discharge: HOME | End: 2019-06-11
Payer: MEDICARE

## 2019-06-11 DIAGNOSIS — M86.671 OTHER CHRONIC OSTEOMYELITIS, RIGHT ANKLE AND FOOT: ICD-10-CM

## 2019-06-11 DIAGNOSIS — Z85.828 PERSONAL HISTORY OF OTHER MALIGNANT NEOPLASM OF SKIN: Chronic | ICD-10-CM

## 2019-06-11 PROCEDURE — 99213 OFFICE O/P EST LOW 20 MIN: CPT

## 2019-06-11 PROCEDURE — G0463: CPT

## 2019-06-13 DIAGNOSIS — M86.671 OTHER CHRONIC OSTEOMYELITIS, RIGHT ANKLE AND FOOT: ICD-10-CM

## 2019-06-13 DIAGNOSIS — L84 CORNS AND CALLOSITIES: ICD-10-CM

## 2019-06-13 DIAGNOSIS — Z87.828 PERSONAL HISTORY OF OTHER (HEALED) PHYSICAL INJURY AND TRAUMA: ICD-10-CM

## 2019-06-13 DIAGNOSIS — I10 ESSENTIAL (PRIMARY) HYPERTENSION: ICD-10-CM

## 2019-06-13 DIAGNOSIS — M12.9 ARTHROPATHY, UNSPECIFIED: ICD-10-CM

## 2019-06-13 DIAGNOSIS — L89.893 PRESSURE ULCER OF OTHER SITE, STAGE 3: ICD-10-CM

## 2019-06-13 DIAGNOSIS — E66.3 OVERWEIGHT: ICD-10-CM

## 2019-06-13 DIAGNOSIS — Z89.422 ACQUIRED ABSENCE OF OTHER LEFT TOE(S): ICD-10-CM

## 2019-06-13 DIAGNOSIS — I48.91 UNSPECIFIED ATRIAL FIBRILLATION: ICD-10-CM

## 2019-06-13 DIAGNOSIS — Z79.899 OTHER LONG TERM (CURRENT) DRUG THERAPY: ICD-10-CM

## 2019-06-13 DIAGNOSIS — I83.93 ASYMPTOMATIC VARICOSE VEINS OF BILATERAL LOWER EXTREMITIES: ICD-10-CM

## 2019-06-13 DIAGNOSIS — G62.9 POLYNEUROPATHY, UNSPECIFIED: ICD-10-CM

## 2019-06-18 ENCOUNTER — RECORD ABSTRACTING (OUTPATIENT)
Age: 69
End: 2019-06-18

## 2019-06-18 DIAGNOSIS — Z87.828 PERSONAL HISTORY OF OTHER (HEALED) PHYSICAL INJURY AND TRAUMA: ICD-10-CM

## 2019-06-18 DIAGNOSIS — I48.91 UNSPECIFIED ATRIAL FIBRILLATION: ICD-10-CM

## 2019-06-18 DIAGNOSIS — L89.899 PRESSURE ULCER OF OTHER SITE, UNSPECIFIED STAGE: ICD-10-CM

## 2019-07-11 ENCOUNTER — APPOINTMENT (OUTPATIENT)
Dept: PODIATRY | Facility: CLINIC | Age: 69
End: 2019-07-11
Payer: MEDICARE

## 2019-07-11 ENCOUNTER — OUTPATIENT (OUTPATIENT)
Dept: OUTPATIENT SERVICES | Facility: HOSPITAL | Age: 69
LOS: 1 days | Discharge: HOME | End: 2019-07-11
Payer: MEDICARE

## 2019-07-11 VITALS
TEMPERATURE: 98.1 F | HEART RATE: 64 BPM | BODY MASS INDEX: 29.22 KG/M2 | WEIGHT: 235 LBS | DIASTOLIC BLOOD PRESSURE: 73 MMHG | SYSTOLIC BLOOD PRESSURE: 132 MMHG | OXYGEN SATURATION: 96 % | RESPIRATION RATE: 20 BRPM | HEIGHT: 75 IN

## 2019-07-11 DIAGNOSIS — M86.671 OTHER CHRONIC OSTEOMYELITIS, RIGHT ANKLE AND FOOT: ICD-10-CM

## 2019-07-11 DIAGNOSIS — Z85.828 PERSONAL HISTORY OF OTHER MALIGNANT NEOPLASM OF SKIN: Chronic | ICD-10-CM

## 2019-07-11 PROCEDURE — G0463: CPT

## 2019-07-11 PROCEDURE — 99024 POSTOP FOLLOW-UP VISIT: CPT
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
## 2019-07-11 NOTE — ASSESSMENT
[Verbal] : Verbal [Patient] : Patient [Good - alert, interested, motivated] : Good - alert, interested, motivated [Verbalizes knowledge/Understanding] : Verbalizes knowledge/understanding [Foot Care] : foot care [Skin Care] : skin care [Pressure relief] : pressure relief [Signs and symptoms of infection] : sign and symptoms of infection [How and When to Call] : how and when to call [Off-loading] : off-loading [Discharge Planning] : discharge planning [Patient responsibility to plan of care] : patient responsibility to plan of care [] : Yes [Stable] : stable [Home] : Home [Ambulatory] : Ambulatory [FreeTextEntry2] : Maintain optimal skin integrity\par  [FreeTextEntry4] : Dr Mcrae/ Photos taken\par Pt to follow up routinely with his Podiatrist (Dr Carl)\par Wound closed- pt discharged from [facility] by Dr Mcrae

## 2019-07-15 DIAGNOSIS — Z85.820 PERSONAL HISTORY OF MALIGNANT MELANOMA OF SKIN: ICD-10-CM

## 2019-07-15 DIAGNOSIS — Z79.82 LONG TERM (CURRENT) USE OF ASPIRIN: ICD-10-CM

## 2019-07-15 DIAGNOSIS — Z87.828 PERSONAL HISTORY OF OTHER (HEALED) PHYSICAL INJURY AND TRAUMA: ICD-10-CM

## 2019-07-15 DIAGNOSIS — L89.893 PRESSURE ULCER OF OTHER SITE, STAGE 3: ICD-10-CM

## 2019-07-15 DIAGNOSIS — K57.32 DIVERTICULITIS OF LARGE INTESTINE WITHOUT PERFORATION OR ABSCESS WITHOUT BLEEDING: ICD-10-CM

## 2019-07-15 DIAGNOSIS — I83.893 VARICOSE VEINS OF BILATERAL LOWER EXTREMITIES WITH OTHER COMPLICATIONS: ICD-10-CM

## 2019-07-15 DIAGNOSIS — Z82.49 FAMILY HISTORY OF ISCHEMIC HEART DISEASE AND OTHER DISEASES OF THE CIRCULATORY SYSTEM: ICD-10-CM

## 2019-07-15 DIAGNOSIS — I48.91 UNSPECIFIED ATRIAL FIBRILLATION: ICD-10-CM

## 2019-07-15 DIAGNOSIS — G62.9 POLYNEUROPATHY, UNSPECIFIED: ICD-10-CM

## 2019-07-15 DIAGNOSIS — I10 ESSENTIAL (PRIMARY) HYPERTENSION: ICD-10-CM

## 2019-07-15 DIAGNOSIS — Z84.0 FAMILY HISTORY OF DISEASES OF THE SKIN AND SUBCUTANEOUS TISSUE: ICD-10-CM

## 2019-07-15 DIAGNOSIS — Z79.01 LONG TERM (CURRENT) USE OF ANTICOAGULANTS: ICD-10-CM

## 2019-07-15 DIAGNOSIS — Z79.899 OTHER LONG TERM (CURRENT) DRUG THERAPY: ICD-10-CM

## 2020-01-28 ENCOUNTER — APPOINTMENT (OUTPATIENT)
Dept: ORTHOPEDIC SURGERY | Facility: CLINIC | Age: 70
End: 2020-01-28
Payer: COMMERCIAL

## 2020-01-28 VITALS
DIASTOLIC BLOOD PRESSURE: 86 MMHG | HEART RATE: 82 BPM | BODY MASS INDEX: 29.22 KG/M2 | WEIGHT: 235 LBS | SYSTOLIC BLOOD PRESSURE: 135 MMHG | HEIGHT: 75 IN

## 2020-01-28 DIAGNOSIS — M19.011 PRIMARY OSTEOARTHRITIS, RIGHT SHOULDER: ICD-10-CM

## 2020-01-28 DIAGNOSIS — M19.012 PRIMARY OSTEOARTHRITIS, LEFT SHOULDER: ICD-10-CM

## 2020-01-28 DIAGNOSIS — M75.31 CALCIFIC TENDINITIS OF RIGHT SHOULDER: ICD-10-CM

## 2020-01-28 PROCEDURE — 99203 OFFICE O/P NEW LOW 30 MIN: CPT | Mod: 25

## 2020-01-28 PROCEDURE — 20610 DRAIN/INJ JOINT/BURSA W/O US: CPT | Mod: 50

## 2020-06-10 ENCOUNTER — OUTPATIENT (OUTPATIENT)
Dept: OUTPATIENT SERVICES | Facility: HOSPITAL | Age: 70
LOS: 1 days | Discharge: HOME | End: 2020-06-10
Payer: COMMERCIAL

## 2020-06-10 ENCOUNTER — APPOINTMENT (OUTPATIENT)
Dept: PODIATRY | Facility: HOSPITAL | Age: 70
End: 2020-06-10
Payer: MEDICARE

## 2020-06-10 ENCOUNTER — RESULT REVIEW (OUTPATIENT)
Age: 70
End: 2020-06-10

## 2020-06-10 VITALS
DIASTOLIC BLOOD PRESSURE: 90 MMHG | SYSTOLIC BLOOD PRESSURE: 147 MMHG | TEMPERATURE: 97 F | HEART RATE: 89 BPM | OXYGEN SATURATION: 99 % | RESPIRATION RATE: 20 BRPM | WEIGHT: 235 LBS | BODY MASS INDEX: 29.22 KG/M2 | HEIGHT: 75 IN

## 2020-06-10 DIAGNOSIS — Z85.828 PERSONAL HISTORY OF OTHER MALIGNANT NEOPLASM OF SKIN: Chronic | ICD-10-CM

## 2020-06-10 DIAGNOSIS — S91.309A UNSPECIFIED OPEN WOUND, UNSPECIFIED FOOT, INITIAL ENCOUNTER: ICD-10-CM

## 2020-06-10 DIAGNOSIS — Z79.01 LONG TERM (CURRENT) USE OF ANTICOAGULANTS: ICD-10-CM

## 2020-06-10 DIAGNOSIS — Z85.820 PERSONAL HISTORY OF MALIGNANT MELANOMA OF SKIN: ICD-10-CM

## 2020-06-10 DIAGNOSIS — I10 ESSENTIAL (PRIMARY) HYPERTENSION: ICD-10-CM

## 2020-06-10 DIAGNOSIS — L89.893 PRESSURE ULCER OF OTHER SITE, STAGE 3: ICD-10-CM

## 2020-06-10 DIAGNOSIS — Z87.828 PERSONAL HISTORY OF OTHER (HEALED) PHYSICAL INJURY AND TRAUMA: ICD-10-CM

## 2020-06-10 DIAGNOSIS — Z79.82 LONG TERM (CURRENT) USE OF ASPIRIN: ICD-10-CM

## 2020-06-10 DIAGNOSIS — Z84.0 FAMILY HISTORY OF DISEASES OF THE SKIN AND SUBCUTANEOUS TISSUE: ICD-10-CM

## 2020-06-10 DIAGNOSIS — K57.32 DIVERTICULITIS OF LARGE INTESTINE WITHOUT PERFORATION OR ABSCESS WITHOUT BLEEDING: ICD-10-CM

## 2020-06-10 DIAGNOSIS — Z82.49 FAMILY HISTORY OF ISCHEMIC HEART DISEASE AND OTHER DISEASES OF THE CIRCULATORY SYSTEM: ICD-10-CM

## 2020-06-10 DIAGNOSIS — Z79.899 OTHER LONG TERM (CURRENT) DRUG THERAPY: ICD-10-CM

## 2020-06-10 DIAGNOSIS — I48.91 UNSPECIFIED ATRIAL FIBRILLATION: ICD-10-CM

## 2020-06-10 PROCEDURE — 73630 X-RAY EXAM OF FOOT: CPT

## 2020-06-10 PROCEDURE — 73630 X-RAY EXAM OF FOOT: CPT | Mod: 26,50

## 2020-06-10 PROCEDURE — 99214 OFFICE O/P EST MOD 30 MIN: CPT

## 2020-06-10 PROCEDURE — G0463: CPT

## 2020-06-10 RX ORDER — IBUPROFEN 800 MG/1
800 TABLET, FILM COATED ORAL
Qty: 21 | Refills: 0 | Status: DISCONTINUED | COMMUNITY
Start: 2019-03-13 | End: 2020-06-10

## 2020-06-10 RX ORDER — UBIDECARENONE/VIT E ACET 100MG-5
25 MCG CAPSULE ORAL
Refills: 0 | Status: DISCONTINUED | COMMUNITY
End: 2020-06-10

## 2020-06-10 RX ORDER — SIMVASTATIN 10 MG/1
10 TABLET, FILM COATED ORAL
Qty: 30 | Refills: 0 | Status: DISCONTINUED | COMMUNITY
Start: 2019-03-19 | End: 2020-06-10

## 2020-06-10 RX ORDER — LEVOFLOXACIN 500 MG/1
500 TABLET, FILM COATED ORAL
Qty: 10 | Refills: 0 | Status: DISCONTINUED | COMMUNITY
Start: 2019-02-25 | End: 2020-06-10

## 2020-06-10 RX ORDER — LISINOPRIL 5 MG/1
5 TABLET ORAL
Qty: 30 | Refills: 0 | Status: DISCONTINUED | COMMUNITY
Start: 2019-03-19 | End: 2020-06-10

## 2020-06-10 RX ORDER — APIXABAN 5 MG/1
5 TABLET, FILM COATED ORAL
Qty: 60 | Refills: 0 | Status: DISCONTINUED | COMMUNITY
Start: 2019-03-18 | End: 2020-06-10

## 2020-06-10 RX ORDER — OXYCODONE AND ACETAMINOPHEN 5; 325 MG/1; MG/1
5-325 TABLET ORAL
Qty: 12 | Refills: 0 | Status: DISCONTINUED | COMMUNITY
Start: 2019-03-13 | End: 2020-06-10

## 2020-06-10 NOTE — ASSESSMENT
[] : Yes [Verbal] : Verbal [Demo] : Demo [Patient] : Patient [Good - alert, interested, motivated] : Good - alert, interested, motivated [Verbalizes knowledge/Understanding] : Verbalizes knowledge/understanding [Dressing changes] : dressing changes [Foot Care] : foot care [Skin Care] : skin care [Signs and symptoms of infection] : sign and symptoms of infection [How and When to Call] : how and when to call [Labs and Tests] : labs and tests [Off-loading] : off-loading [Patient responsibility to plan of care] : patient responsibility to plan of care [Stable] : stable [Home] : Home [Ambulatory] : Ambulatory [Not Applicable - Long Term Care/Home Health Agency] : Long Term Care/Home Health Agency: Not Applicable [FreeTextEntry2] : Promote optimal skin integrity, offloading, infection prevention\par  [FreeTextEntry4] : Circulation\par \par Dorsalis Pedis:  bilateral palpable\par Posterior Tibialis:  bilateral palpable\par Doppler Pulses:  bilateral  present \par Extremity Color:  bilateral pink   \par Extremity Temperature:  bilateral warm\par Capillary Refill:  bilateral <3 sec\par HOMERO:  Non-invasive vascular studies ordered - submitted for authorization\par \par Xray of bilateral feet to be done today at [facility]\par \par f/u 1 week
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
## 2020-06-10 NOTE — PLAN
[FreeTextEntry1] : off loading , wound care . Patient to have x rays and shoe gear adjustments , Patient may need future surgical revision . PTR 1 week
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
## 2020-06-10 NOTE — REVIEW OF SYSTEMS
[Arthralgias] : arthralgias [Skin Wound] : skin wound [Joint Stiffness] : joint stiffness [Negative] : Endocrine [Fever] : no fever [Chills] : no chills [Eye Pain] : no eye pain [Loss Of Hearing] : no hearing loss [Abdominal Pain] : no abdominal pain [Vomiting] : no vomiting [Anxiety] : no anxiety [Easy Bleeding] : no tendency for easy bleeding [Easy Bruising] : no tendency for easy bruising [FreeTextEntry5] : HTN [FreeTextEntry9] : Hammer toes and forefoot deformity  [de-identified] : Stage 3 pressure ulcer plantar 1st met right foot , skin, subcutaneous and fat  [de-identified] : peripheral neuropathy

## 2020-06-17 ENCOUNTER — OUTPATIENT (OUTPATIENT)
Dept: OUTPATIENT SERVICES | Facility: HOSPITAL | Age: 70
LOS: 1 days | Discharge: HOME | End: 2020-06-17
Payer: COMMERCIAL

## 2020-06-17 ENCOUNTER — NON-APPOINTMENT (OUTPATIENT)
Age: 70
End: 2020-06-17

## 2020-06-17 ENCOUNTER — APPOINTMENT (OUTPATIENT)
Dept: PODIATRY | Facility: HOSPITAL | Age: 70
End: 2020-06-17
Payer: MEDICARE

## 2020-06-17 ENCOUNTER — OUTPATIENT (OUTPATIENT)
Dept: OUTPATIENT SERVICES | Facility: HOSPITAL | Age: 70
LOS: 1 days | End: 2020-06-17
Payer: COMMERCIAL

## 2020-06-17 ENCOUNTER — RESULT REVIEW (OUTPATIENT)
Age: 70
End: 2020-06-17

## 2020-06-17 VITALS
DIASTOLIC BLOOD PRESSURE: 90 MMHG | TEMPERATURE: 97.9 F | BODY MASS INDEX: 29.22 KG/M2 | WEIGHT: 235 LBS | SYSTOLIC BLOOD PRESSURE: 156 MMHG | HEIGHT: 75 IN | HEART RATE: 84 BPM | OXYGEN SATURATION: 98 % | RESPIRATION RATE: 20 BRPM

## 2020-06-17 DIAGNOSIS — Z84.0 FAMILY HISTORY OF DISEASES OF THE SKIN AND SUBCUTANEOUS TISSUE: ICD-10-CM

## 2020-06-17 DIAGNOSIS — Z79.899 OTHER LONG TERM (CURRENT) DRUG THERAPY: ICD-10-CM

## 2020-06-17 DIAGNOSIS — Z87.828 PERSONAL HISTORY OF OTHER (HEALED) PHYSICAL INJURY AND TRAUMA: ICD-10-CM

## 2020-06-17 DIAGNOSIS — Z79.01 LONG TERM (CURRENT) USE OF ANTICOAGULANTS: ICD-10-CM

## 2020-06-17 DIAGNOSIS — Z82.49 FAMILY HISTORY OF ISCHEMIC HEART DISEASE AND OTHER DISEASES OF THE CIRCULATORY SYSTEM: ICD-10-CM

## 2020-06-17 DIAGNOSIS — G62.9 POLYNEUROPATHY, UNSPECIFIED: ICD-10-CM

## 2020-06-17 DIAGNOSIS — Z85.828 PERSONAL HISTORY OF OTHER MALIGNANT NEOPLASM OF SKIN: Chronic | ICD-10-CM

## 2020-06-17 DIAGNOSIS — S91.309A UNSPECIFIED OPEN WOUND, UNSPECIFIED FOOT, INITIAL ENCOUNTER: ICD-10-CM

## 2020-06-17 DIAGNOSIS — K57.32 DIVERTICULITIS OF LARGE INTESTINE WITHOUT PERFORATION OR ABSCESS WITHOUT BLEEDING: ICD-10-CM

## 2020-06-17 DIAGNOSIS — I48.91 UNSPECIFIED ATRIAL FIBRILLATION: ICD-10-CM

## 2020-06-17 DIAGNOSIS — Z85.820 PERSONAL HISTORY OF MALIGNANT MELANOMA OF SKIN: ICD-10-CM

## 2020-06-17 DIAGNOSIS — L89.893 PRESSURE ULCER OF OTHER SITE, STAGE 3: ICD-10-CM

## 2020-06-17 DIAGNOSIS — I11.9 HYPERTENSIVE HEART DISEASE WITHOUT HEART FAILURE: ICD-10-CM

## 2020-06-17 DIAGNOSIS — L89.892 PRESSURE ULCER OF OTHER SITE, STAGE 2: ICD-10-CM

## 2020-06-17 DIAGNOSIS — Z79.82 LONG TERM (CURRENT) USE OF ASPIRIN: ICD-10-CM

## 2020-06-17 PROCEDURE — 99213 OFFICE O/P EST LOW 20 MIN: CPT

## 2020-06-17 PROCEDURE — 93923 UPR/LXTR ART STDY 3+ LVLS: CPT

## 2020-06-17 PROCEDURE — G0463: CPT

## 2020-06-17 PROCEDURE — 93922 UPR/L XTREMITY ART 2 LEVELS: CPT | Mod: 26

## 2020-06-17 NOTE — ASSESSMENT
[Verbal] : Verbal [Demo] : Demo [Patient] : Patient [Good - alert, interested, motivated] : Good - alert, interested, motivated [Verbalizes knowledge/Understanding] : Verbalizes knowledge/understanding [Dressing changes] : dressing changes [Foot Care] : foot care [Skin Care] : skin care [Signs and symptoms of infection] : sign and symptoms of infection [How and When to Call] : how and when to call [Patient responsibility to plan of care] : patient responsibility to plan of care [Labs and Tests] : labs and tests [Off-loading] : off-loading [Stable] : stable [Home] : Home [Ambulatory] : Ambulatory [Not Applicable - Long Term Care/Home Health Agency] : Long Term Care/Home Health Agency: Not Applicable [] : Yes [FreeTextEntry2] : Restore Optimal Skin Integrity\par Localized Wound Care \par Infection Prevention \par  [FreeTextEntry4] : MD reviewed Xray of bilateral feet & Vascular Studies with Pt. Pt verbalized Understanding.\par MD discussed a possible surgical procedure if wound remains open.\par Pt to F/U to [facility] in 1 Week

## 2020-06-17 NOTE — PLAN
[FreeTextEntry1] : reviewed x rays with the patient , will continue off loading and wound care and have orthotist involved with fabricating an off loading device , If all conservative measures fail , surgery may be indicated

## 2020-06-17 NOTE — REVIEW OF SYSTEMS
[Fever] : no fever [Chills] : no chills [Loss Of Hearing] : no hearing loss [Abdominal Pain] : no abdominal pain [Eye Pain] : no eye pain [Joint Stiffness] : joint stiffness [Arthralgias] : arthralgias [Vomiting] : no vomiting [Easy Bleeding] : no tendency for easy bleeding [Anxiety] : no anxiety [Skin Wound] : skin wound [Negative] : Endocrine [FreeTextEntry5] : HTN [Easy Bruising] : no tendency for easy bruising [de-identified] : peripheral neuropathy  [FreeTextEntry9] : Hammer toes and forefoot deformity  [de-identified] : Stage 3 pressure ulcer plantar 1st met right foot , skin, subcutaneous and fat associated with 1st met hypertrophic spurring
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
## 2020-06-17 NOTE — HISTORY OF PRESENT ILLNESS
[FreeTextEntry1] : pressure ulcer skin subcutaneous and fat , plantar right 1st metatarsal . X rays reviewed with the patient there is hypertrophic bone formation to the right 1st MPJ with a spur directed plantar and causing the ulceration and plantar mechanical breakdown

## 2020-06-24 ENCOUNTER — OUTPATIENT (OUTPATIENT)
Dept: OUTPATIENT SERVICES | Facility: HOSPITAL | Age: 70
LOS: 1 days | Discharge: HOME | End: 2020-06-24
Payer: COMMERCIAL

## 2020-06-24 ENCOUNTER — APPOINTMENT (OUTPATIENT)
Dept: PODIATRY | Facility: HOSPITAL | Age: 70
End: 2020-06-24
Payer: MEDICARE

## 2020-06-24 VITALS
HEIGHT: 75 IN | OXYGEN SATURATION: 98 % | BODY MASS INDEX: 29.22 KG/M2 | TEMPERATURE: 97 F | HEART RATE: 70 BPM | SYSTOLIC BLOOD PRESSURE: 143 MMHG | WEIGHT: 235 LBS | DIASTOLIC BLOOD PRESSURE: 85 MMHG | RESPIRATION RATE: 20 BRPM

## 2020-06-24 DIAGNOSIS — L84 CORNS AND CALLOSITIES: ICD-10-CM

## 2020-06-24 DIAGNOSIS — I11.9 HYPERTENSIVE HEART DISEASE WITHOUT HEART FAILURE: ICD-10-CM

## 2020-06-24 DIAGNOSIS — Z79.82 LONG TERM (CURRENT) USE OF ASPIRIN: ICD-10-CM

## 2020-06-24 DIAGNOSIS — Z87.828 PERSONAL HISTORY OF OTHER (HEALED) PHYSICAL INJURY AND TRAUMA: ICD-10-CM

## 2020-06-24 DIAGNOSIS — K57.32 DIVERTICULITIS OF LARGE INTESTINE WITHOUT PERFORATION OR ABSCESS WITHOUT BLEEDING: ICD-10-CM

## 2020-06-24 DIAGNOSIS — Z85.828 PERSONAL HISTORY OF OTHER MALIGNANT NEOPLASM OF SKIN: Chronic | ICD-10-CM

## 2020-06-24 DIAGNOSIS — Z85.820 PERSONAL HISTORY OF MALIGNANT MELANOMA OF SKIN: ICD-10-CM

## 2020-06-24 DIAGNOSIS — Z84.0 FAMILY HISTORY OF DISEASES OF THE SKIN AND SUBCUTANEOUS TISSUE: ICD-10-CM

## 2020-06-24 DIAGNOSIS — L89.893 PRESSURE ULCER OF OTHER SITE, STAGE 3: ICD-10-CM

## 2020-06-24 DIAGNOSIS — Z79.01 LONG TERM (CURRENT) USE OF ANTICOAGULANTS: ICD-10-CM

## 2020-06-24 DIAGNOSIS — Z82.49 FAMILY HISTORY OF ISCHEMIC HEART DISEASE AND OTHER DISEASES OF THE CIRCULATORY SYSTEM: ICD-10-CM

## 2020-06-24 DIAGNOSIS — G62.9 POLYNEUROPATHY, UNSPECIFIED: ICD-10-CM

## 2020-06-24 DIAGNOSIS — Z79.899 OTHER LONG TERM (CURRENT) DRUG THERAPY: ICD-10-CM

## 2020-06-24 DIAGNOSIS — S91.309A UNSPECIFIED OPEN WOUND, UNSPECIFIED FOOT, INITIAL ENCOUNTER: ICD-10-CM

## 2020-06-24 DIAGNOSIS — I48.91 UNSPECIFIED ATRIAL FIBRILLATION: ICD-10-CM

## 2020-06-24 PROCEDURE — 99213 OFFICE O/P EST LOW 20 MIN: CPT

## 2020-06-24 PROCEDURE — G0463: CPT

## 2020-06-25 NOTE — PLAN
[FreeTextEntry1] : Patient to have off loading , # 15 blade used to trim peripheral callus tissue , continue wound care , resection of bone may be needed .

## 2020-06-25 NOTE — ASSESSMENT
[Verbal] : Verbal [Written] : Written [Demo] : Demo [Patient] : Patient [Good - alert, interested, motivated] : Good - alert, interested, motivated [Verbalizes knowledge/Understanding] : Verbalizes knowledge/understanding [Dressing changes] : dressing changes [Foot Care] : foot care [Skin Care] : skin care [Signs and symptoms of infection] : sign and symptoms of infection [How and When to Call] : how and when to call [Pain Management] : pain management [Patient responsibility to plan of care] : patient responsibility to plan of care [Off-loading] : off-loading [Home] : Home [Stable] : stable [Ambulatory] : Ambulatory [Not Applicable - Long Term Care/Home Health Agency] : Long Term Care/Home Health Agency: Not Applicable [] : No [FreeTextEntry4] : Pedrito from orthotics spoke with patient about offloading devise \par Follow up in 1 week  [FreeTextEntry2] : Infection prevention\par Localized wound care \par Goal of remaining pain free regarding wounds.\par Offloading  [FreeTextEntry3] : Wound remains the same in status
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
## 2020-06-25 NOTE — REVIEW OF SYSTEMS
[Arthralgias] : arthralgias [Joint Stiffness] : joint stiffness [Skin Wound] : skin wound [Negative] : Endocrine [Eye Pain] : no eye pain [Loss Of Hearing] : no hearing loss [Chills] : no chills [Fever] : no fever [Vomiting] : no vomiting [Abdominal Pain] : no abdominal pain [Anxiety] : no anxiety [Easy Bleeding] : no tendency for easy bleeding [FreeTextEntry5] : HTN [FreeTextEntry9] : Hammer toes and forefoot deformity  [Easy Bruising] : no tendency for easy bruising [de-identified] : peripheral neuropathy  [de-identified] : Stage 3 pressure ulcer plantar 1st met right foot , skin, subcutaneous and fat associated with 1st met hypertrophic spurring

## 2020-07-01 ENCOUNTER — APPOINTMENT (OUTPATIENT)
Dept: PODIATRY | Facility: HOSPITAL | Age: 70
End: 2020-07-01
Payer: MEDICARE

## 2020-07-01 ENCOUNTER — OUTPATIENT (OUTPATIENT)
Dept: OUTPATIENT SERVICES | Facility: HOSPITAL | Age: 70
LOS: 1 days | Discharge: HOME | End: 2020-07-01
Payer: COMMERCIAL

## 2020-07-01 VITALS
BODY MASS INDEX: 29.22 KG/M2 | HEART RATE: 84 BPM | SYSTOLIC BLOOD PRESSURE: 150 MMHG | OXYGEN SATURATION: 99 % | HEIGHT: 75 IN | WEIGHT: 235 LBS | RESPIRATION RATE: 20 BRPM | TEMPERATURE: 96.8 F | DIASTOLIC BLOOD PRESSURE: 96 MMHG

## 2020-07-01 DIAGNOSIS — Z79.01 LONG TERM (CURRENT) USE OF ANTICOAGULANTS: ICD-10-CM

## 2020-07-01 DIAGNOSIS — Z79.82 LONG TERM (CURRENT) USE OF ASPIRIN: ICD-10-CM

## 2020-07-01 DIAGNOSIS — L89.893 PRESSURE ULCER OF OTHER SITE, STAGE 3: ICD-10-CM

## 2020-07-01 DIAGNOSIS — Z85.828 PERSONAL HISTORY OF OTHER MALIGNANT NEOPLASM OF SKIN: Chronic | ICD-10-CM

## 2020-07-01 DIAGNOSIS — Z84.0 FAMILY HISTORY OF DISEASES OF THE SKIN AND SUBCUTANEOUS TISSUE: ICD-10-CM

## 2020-07-01 DIAGNOSIS — I48.91 UNSPECIFIED ATRIAL FIBRILLATION: ICD-10-CM

## 2020-07-01 DIAGNOSIS — L84 CORNS AND CALLOSITIES: ICD-10-CM

## 2020-07-01 DIAGNOSIS — Z79.899 OTHER LONG TERM (CURRENT) DRUG THERAPY: ICD-10-CM

## 2020-07-01 DIAGNOSIS — Z82.49 FAMILY HISTORY OF ISCHEMIC HEART DISEASE AND OTHER DISEASES OF THE CIRCULATORY SYSTEM: ICD-10-CM

## 2020-07-01 DIAGNOSIS — G62.9 POLYNEUROPATHY, UNSPECIFIED: ICD-10-CM

## 2020-07-01 DIAGNOSIS — Z85.820 PERSONAL HISTORY OF MALIGNANT MELANOMA OF SKIN: ICD-10-CM

## 2020-07-01 DIAGNOSIS — Z87.828 PERSONAL HISTORY OF OTHER (HEALED) PHYSICAL INJURY AND TRAUMA: ICD-10-CM

## 2020-07-01 DIAGNOSIS — I11.9 HYPERTENSIVE HEART DISEASE WITHOUT HEART FAILURE: ICD-10-CM

## 2020-07-01 PROCEDURE — G0463: CPT

## 2020-07-01 PROCEDURE — 99213 OFFICE O/P EST LOW 20 MIN: CPT

## 2020-07-01 NOTE — ASSESSMENT
[Verbal] : Verbal [Demo] : Demo [Patient] : Patient [Good - alert, interested, motivated] : Good - alert, interested, motivated [Dressing changes] : dressing changes [Verbalizes knowledge/Understanding] : Verbalizes knowledge/understanding [Foot Care] : foot care [Skin Care] : skin care [Signs and symptoms of infection] : sign and symptoms of infection [How and When to Call] : how and when to call [Labs and Tests] : labs and tests [Patient responsibility to plan of care] : patient responsibility to plan of care [Off-loading] : off-loading [] : Yes [Stable] : stable [Home] : Home [Ambulatory] : Ambulatory [Not Applicable - Long Term Care/Home Health Agency] : Long Term Care/Home Health Agency: Not Applicable [Pressure relief] : pressure relief [FreeTextEntry2] : Restore Optimal Skin Integrity\par Localized Wound Care \par Infection Prevention \par Offloading/Pressure Relief \par  [FreeTextEntry4] : MD discussed a possible surgical procedure if wound remains open.\par Left Message for Pedrito ([facility]) to contact MD to order proper Offloading. \par Pt to F/U to [facility] in 1 Week

## 2020-07-01 NOTE — PLAN
[FreeTextEntry1] : continue off loading andf wound care , patient to have custom inserts made . If conservative measures fail surgery to remove excess bone will be needed

## 2020-07-01 NOTE — REVIEW OF SYSTEMS
[Fever] : no fever [Chills] : no chills [Eye Pain] : no eye pain [Abdominal Pain] : no abdominal pain [Loss Of Hearing] : no hearing loss [Arthralgias] : arthralgias [Vomiting] : no vomiting [Joint Stiffness] : joint stiffness [Skin Wound] : skin wound [Anxiety] : no anxiety [Easy Bleeding] : no tendency for easy bleeding [Easy Bruising] : no tendency for easy bruising [Negative] : Endocrine [FreeTextEntry5] : HTN [de-identified] : Stage 3 pressure ulcer plantar 1st met right foot , skin, subcutaneous and fat associated with 1st met hypertrophic spurring  [FreeTextEntry9] : Hammer toes and forefoot deformity  [de-identified] : peripheral neuropathy
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
## 2020-07-08 ENCOUNTER — APPOINTMENT (OUTPATIENT)
Dept: PODIATRY | Facility: HOSPITAL | Age: 70
End: 2020-07-08

## 2020-07-15 ENCOUNTER — OUTPATIENT (OUTPATIENT)
Dept: OUTPATIENT SERVICES | Facility: HOSPITAL | Age: 70
LOS: 1 days | Discharge: HOME | End: 2020-07-15
Payer: COMMERCIAL

## 2020-07-15 ENCOUNTER — APPOINTMENT (OUTPATIENT)
Dept: PODIATRY | Facility: HOSPITAL | Age: 70
End: 2020-07-15
Payer: MEDICARE

## 2020-07-15 VITALS
HEIGHT: 75 IN | BODY MASS INDEX: 29.22 KG/M2 | SYSTOLIC BLOOD PRESSURE: 140 MMHG | RESPIRATION RATE: 20 BRPM | OXYGEN SATURATION: 98 % | WEIGHT: 235 LBS | TEMPERATURE: 96.8 F | HEART RATE: 88 BPM | DIASTOLIC BLOOD PRESSURE: 91 MMHG

## 2020-07-15 VITALS
RESPIRATION RATE: 20 BRPM | HEART RATE: 88 BPM | SYSTOLIC BLOOD PRESSURE: 140 MMHG | OXYGEN SATURATION: 98 % | TEMPERATURE: 96.8 F | HEIGHT: 75 IN | DIASTOLIC BLOOD PRESSURE: 91 MMHG | WEIGHT: 235 LBS | BODY MASS INDEX: 29.22 KG/M2

## 2020-07-15 DIAGNOSIS — G62.9 POLYNEUROPATHY, UNSPECIFIED: ICD-10-CM

## 2020-07-15 DIAGNOSIS — Z87.828 PERSONAL HISTORY OF OTHER (HEALED) PHYSICAL INJURY AND TRAUMA: ICD-10-CM

## 2020-07-15 DIAGNOSIS — Z85.820 PERSONAL HISTORY OF MALIGNANT MELANOMA OF SKIN: ICD-10-CM

## 2020-07-15 DIAGNOSIS — Z85.828 PERSONAL HISTORY OF OTHER MALIGNANT NEOPLASM OF SKIN: Chronic | ICD-10-CM

## 2020-07-15 DIAGNOSIS — I11.9 HYPERTENSIVE HEART DISEASE WITHOUT HEART FAILURE: ICD-10-CM

## 2020-07-15 DIAGNOSIS — Z79.899 OTHER LONG TERM (CURRENT) DRUG THERAPY: ICD-10-CM

## 2020-07-15 DIAGNOSIS — Z79.82 LONG TERM (CURRENT) USE OF ASPIRIN: ICD-10-CM

## 2020-07-15 DIAGNOSIS — Z84.0 FAMILY HISTORY OF DISEASES OF THE SKIN AND SUBCUTANEOUS TISSUE: ICD-10-CM

## 2020-07-15 DIAGNOSIS — Z82.49 FAMILY HISTORY OF ISCHEMIC HEART DISEASE AND OTHER DISEASES OF THE CIRCULATORY SYSTEM: ICD-10-CM

## 2020-07-15 DIAGNOSIS — I48.91 UNSPECIFIED ATRIAL FIBRILLATION: ICD-10-CM

## 2020-07-15 DIAGNOSIS — L89.893 PRESSURE ULCER OF OTHER SITE, STAGE 3: ICD-10-CM

## 2020-07-15 DIAGNOSIS — Z79.01 LONG TERM (CURRENT) USE OF ANTICOAGULANTS: ICD-10-CM

## 2020-07-15 DIAGNOSIS — K57.32 DIVERTICULITIS OF LARGE INTESTINE WITHOUT PERFORATION OR ABSCESS WITHOUT BLEEDING: ICD-10-CM

## 2020-07-15 PROCEDURE — 99213 OFFICE O/P EST LOW 20 MIN: CPT

## 2020-07-15 PROCEDURE — G0463: CPT

## 2020-07-15 NOTE — ASSESSMENT
[Verbal] : Verbal [Patient] : Patient [Verbalizes knowledge/Understanding] : Verbalizes knowledge/understanding [Good - alert, interested, motivated] : Good - alert, interested, motivated [Dressing changes] : dressing changes [Foot Care] : foot care [Skin Care] : skin care [Pressure relief] : pressure relief [Signs and symptoms of infection] : sign and symptoms of infection [How and When to Call] : how and when to call [Off-loading] : off-loading [Patient responsibility to plan of care] : patient responsibility to plan of care [] : Yes [Stable] : stable [Home] : Home [Ambulatory] : Ambulatory [Not Applicable - Long Term Care/Home Health Agency] : Long Term Care/Home Health Agency: Not Applicable [FreeTextEntry2] : Restore Skin Integrity\par Infection Control\par Localized wound care\par Maintain acceptable pain levels at satisfactory relief.\par Demonstrates use of both nonpharmacological and pharmacological pain relief strategies [FreeTextEntry4] : Photos Taken\par F/U to [facility] in 1 week

## 2020-07-15 NOTE — HISTORY OF PRESENT ILLNESS
[FreeTextEntry1] : Stage 3 pressure ulcer plantar right foot 1st metatarsal ,skin, subcutaneous and fat . Caused by abnormal bone pressure . Patient would like to get thru the summer and then address the bone issue surgically . Currently the ulcer is clean and stable .
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
## 2020-07-15 NOTE — REVIEW OF SYSTEMS
[Fever] : no fever [Chills] : no chills [Eye Pain] : no eye pain [Loss Of Hearing] : no hearing loss [Shortness Of Breath] : no shortness of breath [Wheezing] : no wheezing [Abdominal Pain] : no abdominal pain [Vomiting] : no vomiting [Arthralgias] : arthralgias [Joint Stiffness] : joint stiffness [Skin Wound] : skin wound [Anxiety] : no anxiety [Easy Bleeding] : no tendency for easy bleeding [Negative] : Endocrine [Easy Bruising] : no tendency for easy bruising [FreeTextEntry5] : HTN [de-identified] : peripheral neuropathy  [FreeTextEntry9] : Hammer toes and forefoot deformity  [de-identified] : Stage 3 pressure ulcer plantar 1st met right foot , skin, subcutaneous and fat associated with 1st met hypertrophic spurring

## 2020-07-29 ENCOUNTER — APPOINTMENT (OUTPATIENT)
Dept: PODIATRY | Facility: HOSPITAL | Age: 70
End: 2020-07-29
Payer: MEDICARE

## 2020-07-29 ENCOUNTER — OUTPATIENT (OUTPATIENT)
Dept: OUTPATIENT SERVICES | Facility: HOSPITAL | Age: 70
LOS: 1 days | Discharge: HOME | End: 2020-07-29
Payer: COMMERCIAL

## 2020-07-29 VITALS
SYSTOLIC BLOOD PRESSURE: 147 MMHG | BODY MASS INDEX: 29.22 KG/M2 | HEART RATE: 83 BPM | RESPIRATION RATE: 20 BRPM | WEIGHT: 235 LBS | DIASTOLIC BLOOD PRESSURE: 84 MMHG | HEIGHT: 75 IN | OXYGEN SATURATION: 99 % | TEMPERATURE: 97.8 F

## 2020-07-29 DIAGNOSIS — L89.893 PRESSURE ULCER OF OTHER SITE, STAGE 3: ICD-10-CM

## 2020-07-29 DIAGNOSIS — Z82.49 FAMILY HISTORY OF ISCHEMIC HEART DISEASE AND OTHER DISEASES OF THE CIRCULATORY SYSTEM: ICD-10-CM

## 2020-07-29 DIAGNOSIS — Z85.828 PERSONAL HISTORY OF OTHER MALIGNANT NEOPLASM OF SKIN: Chronic | ICD-10-CM

## 2020-07-29 DIAGNOSIS — I48.91 UNSPECIFIED ATRIAL FIBRILLATION: ICD-10-CM

## 2020-07-29 DIAGNOSIS — I11.9 HYPERTENSIVE HEART DISEASE WITHOUT HEART FAILURE: ICD-10-CM

## 2020-07-29 DIAGNOSIS — Z79.82 LONG TERM (CURRENT) USE OF ASPIRIN: ICD-10-CM

## 2020-07-29 DIAGNOSIS — L84 CORNS AND CALLOSITIES: ICD-10-CM

## 2020-07-29 DIAGNOSIS — Z79.01 LONG TERM (CURRENT) USE OF ANTICOAGULANTS: ICD-10-CM

## 2020-07-29 DIAGNOSIS — G62.9 POLYNEUROPATHY, UNSPECIFIED: ICD-10-CM

## 2020-07-29 DIAGNOSIS — Z84.0 FAMILY HISTORY OF DISEASES OF THE SKIN AND SUBCUTANEOUS TISSUE: ICD-10-CM

## 2020-07-29 DIAGNOSIS — Z79.899 OTHER LONG TERM (CURRENT) DRUG THERAPY: ICD-10-CM

## 2020-07-29 DIAGNOSIS — K57.32 DIVERTICULITIS OF LARGE INTESTINE WITHOUT PERFORATION OR ABSCESS WITHOUT BLEEDING: ICD-10-CM

## 2020-07-29 DIAGNOSIS — Z85.820 PERSONAL HISTORY OF MALIGNANT MELANOMA OF SKIN: ICD-10-CM

## 2020-07-29 PROCEDURE — G0463: CPT

## 2020-07-29 PROCEDURE — 99213 OFFICE O/P EST LOW 20 MIN: CPT

## 2020-07-29 NOTE — ASSESSMENT
[Verbal] : Verbal [Patient] : Patient [Good - alert, interested, motivated] : Good - alert, interested, motivated [Verbalizes knowledge/Understanding] : Verbalizes knowledge/understanding [Dressing changes] : dressing changes [Foot Care] : foot care [Skin Care] : skin care [Pressure relief] : pressure relief [Signs and symptoms of infection] : sign and symptoms of infection [How and When to Call] : how and when to call [Off-loading] : off-loading [Patient responsibility to plan of care] : patient responsibility to plan of care [] : Yes [Stable] : stable [Home] : Home [Ambulatory] : Ambulatory [Not Applicable - Long Term Care/Home Health Agency] : Long Term Care/Home Health Agency: Not Applicable [FreeTextEntry2] : Restore Skin Integrity\par Infection Control\par Localized wound care\par Maintain acceptable pain levels at satisfactory relief.\par Demonstrates use of both nonpharmacological and pharmacological pain relief strategies [FreeTextEntry4] : Photos Taken\maycol GUSMAN Discussed Surgical Procedure in September with Pt. \maycol F/U to [facility] in 1 week

## 2020-07-29 NOTE — PLAN
[FreeTextEntry1] : continue off loading and keep wound clean , Plan for September surgery for resection of bone from the !st metatarsal .

## 2020-07-29 NOTE — REVIEW OF SYSTEMS
[Fever] : no fever [Eye Pain] : no eye pain [Chills] : no chills [Loss Of Hearing] : no hearing loss [Wheezing] : no wheezing [Shortness Of Breath] : no shortness of breath [Abdominal Pain] : no abdominal pain [Joint Stiffness] : joint stiffness [Arthralgias] : arthralgias [Vomiting] : no vomiting [Skin Wound] : skin wound [Anxiety] : no anxiety [Easy Bleeding] : no tendency for easy bleeding [Negative] : Endocrine [Easy Bruising] : no tendency for easy bruising [FreeTextEntry9] : Hammer toes and forefoot deformity  [FreeTextEntry5] : HTN [de-identified] : Stage 3 pressure ulcer plantar 1st met right foot , skin, subcutaneous and fat associated with 1st met hypertrophic spurring  [de-identified] : peripheral neuropathy
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
## 2020-08-06 ENCOUNTER — APPOINTMENT (OUTPATIENT)
Dept: PODIATRY | Facility: HOSPITAL | Age: 70
End: 2020-08-06

## 2020-08-11 ENCOUNTER — APPOINTMENT (OUTPATIENT)
Dept: PODIATRY | Facility: HOSPITAL | Age: 70
End: 2020-08-11

## 2020-08-12 ENCOUNTER — APPOINTMENT (OUTPATIENT)
Dept: PODIATRY | Facility: HOSPITAL | Age: 70
End: 2020-08-12
Payer: MEDICARE

## 2020-08-12 ENCOUNTER — OUTPATIENT (OUTPATIENT)
Dept: OUTPATIENT SERVICES | Facility: HOSPITAL | Age: 70
LOS: 1 days | Discharge: HOME | End: 2020-08-12
Payer: COMMERCIAL

## 2020-08-12 VITALS
HEART RATE: 83 BPM | TEMPERATURE: 97.9 F | RESPIRATION RATE: 20 BRPM | WEIGHT: 235 LBS | DIASTOLIC BLOOD PRESSURE: 79 MMHG | HEIGHT: 75 IN | BODY MASS INDEX: 29.22 KG/M2 | SYSTOLIC BLOOD PRESSURE: 116 MMHG | OXYGEN SATURATION: 97 %

## 2020-08-12 DIAGNOSIS — Z85.828 PERSONAL HISTORY OF OTHER MALIGNANT NEOPLASM OF SKIN: Chronic | ICD-10-CM

## 2020-08-12 DIAGNOSIS — L89.893 PRESSURE ULCER OF OTHER SITE, STAGE 3: ICD-10-CM

## 2020-08-12 PROCEDURE — 99213 OFFICE O/P EST LOW 20 MIN: CPT

## 2020-08-12 PROCEDURE — G0463: CPT

## 2020-08-13 DIAGNOSIS — Z79.82 LONG TERM (CURRENT) USE OF ASPIRIN: ICD-10-CM

## 2020-08-13 DIAGNOSIS — I11.9 HYPERTENSIVE HEART DISEASE WITHOUT HEART FAILURE: ICD-10-CM

## 2020-08-13 DIAGNOSIS — Z84.0 FAMILY HISTORY OF DISEASES OF THE SKIN AND SUBCUTANEOUS TISSUE: ICD-10-CM

## 2020-08-13 DIAGNOSIS — L84 CORNS AND CALLOSITIES: ICD-10-CM

## 2020-08-13 DIAGNOSIS — Z79.899 OTHER LONG TERM (CURRENT) DRUG THERAPY: ICD-10-CM

## 2020-08-13 DIAGNOSIS — L89.893 PRESSURE ULCER OF OTHER SITE, STAGE 3: ICD-10-CM

## 2020-08-13 DIAGNOSIS — Z85.820 PERSONAL HISTORY OF MALIGNANT MELANOMA OF SKIN: ICD-10-CM

## 2020-08-13 DIAGNOSIS — G62.9 POLYNEUROPATHY, UNSPECIFIED: ICD-10-CM

## 2020-08-13 DIAGNOSIS — Z82.49 FAMILY HISTORY OF ISCHEMIC HEART DISEASE AND OTHER DISEASES OF THE CIRCULATORY SYSTEM: ICD-10-CM

## 2020-08-13 DIAGNOSIS — Z85.828 PERSONAL HISTORY OF OTHER MALIGNANT NEOPLASM OF SKIN: ICD-10-CM

## 2020-08-13 DIAGNOSIS — Z79.01 LONG TERM (CURRENT) USE OF ANTICOAGULANTS: ICD-10-CM

## 2020-08-13 DIAGNOSIS — I48.91 UNSPECIFIED ATRIAL FIBRILLATION: ICD-10-CM

## 2020-08-13 DIAGNOSIS — K57.32 DIVERTICULITIS OF LARGE INTESTINE WITHOUT PERFORATION OR ABSCESS WITHOUT BLEEDING: ICD-10-CM
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
## 2020-08-13 NOTE — ASSESSMENT
[Patient] : Patient [Verbal] : Verbal [Demo] : Demo [Written] : Written [Good - alert, interested, motivated] : Good - alert, interested, motivated [Needs reinforcement] : needs reinforcement [Foot Care] : foot care [Signs and symptoms of infection] : sign and symptoms of infection [Skin Care] : skin care [Dressing changes] : dressing changes [Surgery] : surgery [Nutrition] : nutrition [How and When to Call] : how and when to call [Pain Management] : pain management [Off-loading] : off-loading [Hospitalization] : hospitalization [Stable] : stable [Not Applicable - Long Term Care/Home Health Agency] : Long Term Care/Home Health Agency: Not Applicable [Ambulatory] : Ambulatory [Home] : Home [] : No [FreeTextEntry2] : Infection prevention\par Localized wound care \par Goal of remaining pain free regarding wounds.\par Offloading\par Surgical intervention  [FreeTextEntry3] : Wound the same in status  [FreeTextEntry4] : Patient is to come to [facility] on 8/18/20 at 8: 00 AM to be admitted to [facility] for surgical intervention

## 2020-08-13 NOTE — PLAN
[FreeTextEntry1] : The ulcer is not infected but it is changing and patient would like to resolve this problem soon . Patient will need antibiotics , and surgical intervention of the 1st metatarsal . Discussed surgery with the patient and he agrees with the plan and knows all the risks and benefits . Patient to be admitted next week for surgery .

## 2020-08-18 ENCOUNTER — INPATIENT (INPATIENT)
Facility: HOSPITAL | Age: 70
LOS: 2 days | Discharge: HOME | DRG: 476 | End: 2020-08-21
Attending: FAMILY MEDICINE | Admitting: STUDENT IN AN ORGANIZED HEALTH CARE EDUCATION/TRAINING PROGRAM
Payer: COMMERCIAL

## 2020-08-18 ENCOUNTER — TRANSCRIPTION ENCOUNTER (OUTPATIENT)
Age: 70
End: 2020-08-18

## 2020-08-18 VITALS
HEIGHT: 75 IN | TEMPERATURE: 98 F | WEIGHT: 235.01 LBS | SYSTOLIC BLOOD PRESSURE: 137 MMHG | OXYGEN SATURATION: 98 % | DIASTOLIC BLOOD PRESSURE: 86 MMHG | HEART RATE: 86 BPM | RESPIRATION RATE: 16 BRPM

## 2020-08-18 DIAGNOSIS — Z29.9 ENCOUNTER FOR PROPHYLACTIC MEASURES, UNSPECIFIED: ICD-10-CM

## 2020-08-18 DIAGNOSIS — L97.519 NON-PRESSURE CHRONIC ULCER OF OTHER PART OF RIGHT FOOT WITH UNSPECIFIED SEVERITY: ICD-10-CM

## 2020-08-18 DIAGNOSIS — I48.91 UNSPECIFIED ATRIAL FIBRILLATION: ICD-10-CM

## 2020-08-18 DIAGNOSIS — I10 ESSENTIAL (PRIMARY) HYPERTENSION: ICD-10-CM

## 2020-08-18 DIAGNOSIS — M86.9 OSTEOMYELITIS, UNSPECIFIED: ICD-10-CM

## 2020-08-18 DIAGNOSIS — Z85.828 PERSONAL HISTORY OF OTHER MALIGNANT NEOPLASM OF SKIN: Chronic | ICD-10-CM

## 2020-08-18 LAB
ALBUMIN SERPL ELPH-MCNC: 3.5 G/DL — SIGNIFICANT CHANGE UP (ref 3.3–5)
ALP SERPL-CCNC: 75 U/L — SIGNIFICANT CHANGE UP (ref 40–120)
ALT FLD-CCNC: 20 U/L — SIGNIFICANT CHANGE UP (ref 12–78)
ANION GAP SERPL CALC-SCNC: 4 MMOL/L — LOW (ref 5–17)
APTT BLD: 29.9 SEC — SIGNIFICANT CHANGE UP (ref 27.5–35.5)
AST SERPL-CCNC: 19 U/L — SIGNIFICANT CHANGE UP (ref 15–37)
BASOPHILS # BLD AUTO: 0.04 K/UL — SIGNIFICANT CHANGE UP (ref 0–0.2)
BASOPHILS NFR BLD AUTO: 0.7 % — SIGNIFICANT CHANGE UP (ref 0–2)
BILIRUB SERPL-MCNC: 0.5 MG/DL — SIGNIFICANT CHANGE UP (ref 0.2–1.2)
BLD GP AB SCN SERPL QL: SIGNIFICANT CHANGE UP
BUN SERPL-MCNC: 16 MG/DL — SIGNIFICANT CHANGE UP (ref 7–23)
CALCIUM SERPL-MCNC: 8.8 MG/DL — SIGNIFICANT CHANGE UP (ref 8.5–10.1)
CHLORIDE SERPL-SCNC: 110 MMOL/L — HIGH (ref 96–108)
CO2 SERPL-SCNC: 26 MMOL/L — SIGNIFICANT CHANGE UP (ref 22–31)
CREAT SERPL-MCNC: 1 MG/DL — SIGNIFICANT CHANGE UP (ref 0.5–1.3)
EOSINOPHIL # BLD AUTO: 0.18 K/UL — SIGNIFICANT CHANGE UP (ref 0–0.5)
EOSINOPHIL NFR BLD AUTO: 2.9 % — SIGNIFICANT CHANGE UP (ref 0–6)
GLUCOSE SERPL-MCNC: 98 MG/DL — SIGNIFICANT CHANGE UP (ref 70–99)
HCT VFR BLD CALC: 45.2 % — SIGNIFICANT CHANGE UP (ref 39–50)
HGB BLD-MCNC: 15.1 G/DL — SIGNIFICANT CHANGE UP (ref 13–17)
IMM GRANULOCYTES NFR BLD AUTO: 0.3 % — SIGNIFICANT CHANGE UP (ref 0–1.5)
INR BLD: 1.07 RATIO — SIGNIFICANT CHANGE UP (ref 0.88–1.16)
LACTATE SERPL-SCNC: 0.7 MMOL/L — SIGNIFICANT CHANGE UP (ref 0.7–2)
LYMPHOCYTES # BLD AUTO: 1.01 K/UL — SIGNIFICANT CHANGE UP (ref 1–3.3)
LYMPHOCYTES # BLD AUTO: 16.4 % — SIGNIFICANT CHANGE UP (ref 13–44)
MCHC RBC-ENTMCNC: 31 PG — SIGNIFICANT CHANGE UP (ref 27–34)
MCHC RBC-ENTMCNC: 33.4 GM/DL — SIGNIFICANT CHANGE UP (ref 32–36)
MCV RBC AUTO: 92.8 FL — SIGNIFICANT CHANGE UP (ref 80–100)
MONOCYTES # BLD AUTO: 0.76 K/UL — SIGNIFICANT CHANGE UP (ref 0–0.9)
MONOCYTES NFR BLD AUTO: 12.4 % — SIGNIFICANT CHANGE UP (ref 2–14)
NEUTROPHILS # BLD AUTO: 4.14 K/UL — SIGNIFICANT CHANGE UP (ref 1.8–7.4)
NEUTROPHILS NFR BLD AUTO: 67.3 % — SIGNIFICANT CHANGE UP (ref 43–77)
NRBC # BLD: 0 /100 WBCS — SIGNIFICANT CHANGE UP (ref 0–0)
PLATELET # BLD AUTO: 210 K/UL — SIGNIFICANT CHANGE UP (ref 150–400)
POTASSIUM SERPL-MCNC: 4.2 MMOL/L — SIGNIFICANT CHANGE UP (ref 3.5–5.3)
POTASSIUM SERPL-SCNC: 4.2 MMOL/L — SIGNIFICANT CHANGE UP (ref 3.5–5.3)
PROT SERPL-MCNC: 7.3 G/DL — SIGNIFICANT CHANGE UP (ref 6–8.3)
PROTHROM AB SERPL-ACNC: 12.5 SEC — SIGNIFICANT CHANGE UP (ref 10.6–13.6)
RBC # BLD: 4.87 M/UL — SIGNIFICANT CHANGE UP (ref 4.2–5.8)
RBC # FLD: 13.5 % — SIGNIFICANT CHANGE UP (ref 10.3–14.5)
SARS-COV-2 RNA SPEC QL NAA+PROBE: SIGNIFICANT CHANGE UP
SODIUM SERPL-SCNC: 140 MMOL/L — SIGNIFICANT CHANGE UP (ref 135–145)
WBC # BLD: 6.15 K/UL — SIGNIFICANT CHANGE UP (ref 3.8–10.5)
WBC # FLD AUTO: 6.15 K/UL — SIGNIFICANT CHANGE UP (ref 3.8–10.5)

## 2020-08-18 PROCEDURE — 99232 SBSQ HOSP IP/OBS MODERATE 35: CPT | Mod: 57

## 2020-08-18 PROCEDURE — 71045 X-RAY EXAM CHEST 1 VIEW: CPT | Mod: 26

## 2020-08-18 PROCEDURE — 99284 EMERGENCY DEPT VISIT MOD MDM: CPT

## 2020-08-18 PROCEDURE — 99223 1ST HOSP IP/OBS HIGH 75: CPT

## 2020-08-18 PROCEDURE — 99223 1ST HOSP IP/OBS HIGH 75: CPT | Mod: AI

## 2020-08-18 PROCEDURE — 93010 ELECTROCARDIOGRAM REPORT: CPT

## 2020-08-18 RX ORDER — CHOLECALCIFEROL (VITAMIN D3) 125 MCG
1 CAPSULE ORAL
Qty: 0 | Refills: 0 | DISCHARGE

## 2020-08-18 RX ORDER — ACETAMINOPHEN 500 MG
650 TABLET ORAL EVERY 6 HOURS
Refills: 0 | Status: DISCONTINUED | OUTPATIENT
Start: 2020-08-18 | End: 2020-08-19

## 2020-08-18 RX ORDER — PIPERACILLIN AND TAZOBACTAM 4; .5 G/20ML; G/20ML
3.38 INJECTION, POWDER, LYOPHILIZED, FOR SOLUTION INTRAVENOUS ONCE
Refills: 0 | Status: COMPLETED | OUTPATIENT
Start: 2020-08-18 | End: 2020-08-18

## 2020-08-18 RX ORDER — SODIUM CHLORIDE 9 MG/ML
1000 INJECTION INTRAMUSCULAR; INTRAVENOUS; SUBCUTANEOUS ONCE
Refills: 0 | Status: COMPLETED | OUTPATIENT
Start: 2020-08-18 | End: 2020-08-18

## 2020-08-18 RX ORDER — AMLODIPINE BESYLATE 2.5 MG/1
7.5 TABLET ORAL ONCE
Refills: 0 | Status: COMPLETED | OUTPATIENT
Start: 2020-08-18 | End: 2020-08-18

## 2020-08-18 RX ORDER — ERGOCALCIFEROL 1.25 MG/1
50000 CAPSULE ORAL
Refills: 0 | Status: DISCONTINUED | OUTPATIENT
Start: 2020-08-19 | End: 2020-08-19

## 2020-08-18 RX ORDER — METOPROLOL TARTRATE 50 MG
100 TABLET ORAL DAILY
Refills: 0 | Status: DISCONTINUED | OUTPATIENT
Start: 2020-08-19 | End: 2020-08-19

## 2020-08-18 RX ADMIN — PIPERACILLIN AND TAZOBACTAM 200 GRAM(S): 4; .5 INJECTION, POWDER, LYOPHILIZED, FOR SOLUTION INTRAVENOUS at 09:52

## 2020-08-18 RX ADMIN — AMLODIPINE BESYLATE 7.5 MILLIGRAM(S): 2.5 TABLET ORAL at 19:49

## 2020-08-18 RX ADMIN — SODIUM CHLORIDE 1000 MILLILITER(S): 9 INJECTION INTRAMUSCULAR; INTRAVENOUS; SUBCUTANEOUS at 09:52

## 2020-08-18 NOTE — CONSULT NOTE ADULT - ATTENDING COMMENTS
I personally saw and examined the patient in detail.  I have spoken to the above provider regarding the assessment and plan of care.  I reviewed the above assessment and plan of care, and agree.  I have made changes in the body of the note where appropriate.  Optimized for the OR from a cardiac point of view with no evidence of active ischemic heart disease, decompensated heart failure, severe obstructive valvular disease, or uncontrolled arrhythmia.  Start hydralazine for BP control.  Has been found to be in AF.  He will require AC, but will not start preop.  Check echo routinely, does not need to be done prior to surgery.  Likely to start Eliquis post op.  To follow closely.

## 2020-08-18 NOTE — H&P ADULT - ASSESSMENT
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     XRAY:  Right foot:  Comparison: Right foot 4/17/2019.  Again noted are postsurgical resections of the first second and third rays as well as fusion at the interphalangeal joint of the great toe.  No focal osteolysis is noted.  There are degenerative changes at the tarsometatarsal articulations, progressed since prior study.  The alignment at the tarsometatarsal joints remains within normal limits.      Left foot:  No comparison.  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  There are postsurgical changes/resection at the first metatarsal head and degenerative changes at the metatarsophalangeal joint.  There is lateral subluxation at the interphalangeal joint of the great toe with diffuse joint space narrowing.  There is claw toe deformity at the DIP joint of the toe.  No acute fracture or focal osteolysis is noted.  Calcaneal enthesopathy is present.

## 2020-08-18 NOTE — ED PROVIDER NOTE - SKIN, MLM
Skin normal color for race, warm, dry. ulcer to plantar surface foot over 1st mt head with surrounding erythema nd swelling of foot

## 2020-08-18 NOTE — H&P ADULT - NSHPPHYSICALEXAM_GEN_ALL_CORE
Vital Signs Last 24 Hrs  T(C): 36.4 (18 Aug 2020 12:21), Max: 36.7 (18 Aug 2020 08:10)  T(F): 97.6 (18 Aug 2020 12:21), Max: 98 (18 Aug 2020 08:10)  HR: 85 (18 Aug 2020 12:21) (85 - 86)  BP: 169/92 (18 Aug 2020 12:21) (137/86 - 169/92)  BP(mean): --  RR: 18 (18 Aug 2020 12:21) (16 - 18)  SpO2: 98% (18 Aug 2020 12:21) (98% - 98%) Vital Signs Last 24 Hrs  T(C): 36.4 (18 Aug 2020 12:21), Max: 36.7 (18 Aug 2020 08:10)  T(F): 97.6 (18 Aug 2020 12:21), Max: 98 (18 Aug 2020 08:10)  HR: 85 (18 Aug 2020 12:21) (85 - 86)  BP: 169/92 (18 Aug 2020 12:21) (137/86 - 169/92)  BP(mean): --  RR: 18 (18 Aug 2020 12:21) (16 - 18)  SpO2: 98% (18 Aug 2020 12:21) (98% - 98%)    GENERAL: patient appears well, no acute distress, appropriate  EYES: sclera clear, no exudates, PERRLA  ENMT: oropharynx clear without erythema, no exudates, moist mucous membranes  NECK: supple, soft, no thyromegaly noted  LUNGS: clear to auscultation,  no rales, wheezing or rhonchi appreciated  HEART: S1/S2, regular rate and rhythm, no murmurs noted  GASTROINTESTINAL: abdomen is soft, nontender, nondistended, normoactive bowel sounds, no palpable masses  INTEGUMENT: good skin turgor, warm, dry and intact  MUSCULOSKELETAL: 2+ dorsalis pedis pulse b/l, edema of R. ankle, erythema and incr warmth of distal medial aspect of R. foot,   open ulcer on plantar surface  NEUROLOGIC: awake, alert, oriented x3, strength no obvious sensory deficits  PSYCHIATRIC: mood is good, affect is congruent, linear and logical thought process  HEME/LYMPH:  no obvious ecchymosis or petechiae Vital Signs Last 24 Hrs  T(C): 36.4 (18 Aug 2020 12:21), Max: 36.7 (18 Aug 2020 08:10)  T(F): 97.6 (18 Aug 2020 12:21), Max: 98 (18 Aug 2020 08:10)  HR: 85 (18 Aug 2020 12:21) (85 - 86)  BP: 169/92 (18 Aug 2020 12:21) (137/86 - 169/92)  BP(mean): --  RR: 18 (18 Aug 2020 12:21) (16 - 18)  SpO2: 98% (18 Aug 2020 12:21) (98% - 98%)    GENERAL: patient appears well, no acute distress, appropriate  EYES: sclera clear, no exudates, PERRLA  ENMT: oropharynx clear without erythema, no exudates, moist mucous membranes  NECK: supple, soft, no thyromegaly noted  LUNGS: clear to auscultation,  no rales, wheezing or rhonchi appreciated  HEART: S1/S2, regular rate and rhythm, no murmurs noted  GASTROINTESTINAL: abdomen is soft, nontender, nondistended, normoactive bowel sounds, no palpable masses  INTEGUMENT: good skin turgor, warm, dry and intact  MUSCULOSKELETAL: 2+ dorsalis pedis pulse b/l, edema of R. ankle, erythema and incr warmth of distal medial aspect of R. foot,   open ulcer on plantar surface. Left 2nd digit absent.  Strenght 5/5 in LE  NEUROLOGIC: awake, alert, oriented x3, strength no obvious sensory deficits  PSYCHIATRIC: mood is good, affect is congruent, linear and logical thought process  HEME/LYMPH:  no obvious ecchymosis or petechiae

## 2020-08-18 NOTE — ED ADULT NURSE NOTE - OBJECTIVE STATEMENT
Noted R foot wound.  tunneling.  noted packing insitu.  Dressed with tape by Pt PTA as he does everyday.

## 2020-08-18 NOTE — H&P ADULT - PROBLEM SELECTOR PLAN 2
- S/p cardioversion 1.5yrs ago  - Was on anticoagulation and it was discontinued by his Cardiologist  - F/u with cardio outpt - S/p cardioversion 1.5yrs ago.  - Was on anticoagulation in the past for sometime after cardioversion and it was discontinued by his Cardiologist  - Cardio consulted (Dr Singh) for optimization - S/p cardioversion 1.5yrs ago.  - Was on anticoagulation in the past for sometime after cardioversion and it was discontinued by his Cardiologist  - CHADVASC score; 2 (EKG on admission shows afib, need to discuss about anticoag post procedure)  - Cardio consulted (Dr Singh) for optimization

## 2020-08-18 NOTE — ED PROVIDER NOTE - OBJECTIVE STATEMENT
pt referred to er by podiatry for admission for surg due to infected right 1st mt. pt reports neuropathy from back surg, ulcer since may, tx at wound center. pt denies fevers, chills, ha, d/n/v, cp, sob, cough, abd pain, travel, exposure to known covid pt.  pmd -jesus ([facility])  podiatry - mayela

## 2020-08-18 NOTE — CONSULT NOTE ADULT - ASSESSMENT
Patient was seen and evaluated.   Cleansed wound with sterile gauze   Applied Aquacel and DSD sub 1st metatarsal head right foot     Given the patient's severity of symptoms, will plan for surgical intervention at this time. Discussed risks, benefits, and potential complications with patient and family members regarding surgical intervention including sepsis, loss of limb, and loss of life. All questions answered to satisfaction at this time.    Requesting cardiology risk stratification and optimization.  Requesting medical risk stratification and optimization.    Scheduled for surgery of right foot - first metatarsal head resection on 8/19/2020 at 10:00 am

## 2020-08-18 NOTE — H&P ADULT - PROBLEM SELECTOR PLAN 1
- s/p zosyn in ED  - No leukocytosis, afebrile  - Wound care per podiatry  - F/u podiatry - R/o osteomyelitis  - S/P zosyn in ED  - No leukocytosis, afebrile  - Wound care per podiatry.  - NPO at [facility] for surgical resection of bone from 1st metatarsal R. foot.

## 2020-08-18 NOTE — CONSULT NOTE ADULT - ASSESSMENT
70 yo M pmh neuropaty 2/2 to back surgery, right foot ulcer, melanoma s/p surgery excision, hypertension, Afib s/p cardioversion not currently on AC, sent from Podiatry clinic for surgical resection of 1st metatarsal of right foot.     - Hypertensive: /106; states he took his metoprolol this morning; give additional dose and reassess  - ECG: A fib  - not cleared 2/2 to hypertension, will reassess BP   - Labs: CBC and BMP reviewed and within normal limits   - 68 yo M pm neuropaty 2/2 to back surgery, right foot ulcer, melanoma s/p surgery excision, hypertension, Afib s/p cardioversion not currently on AC, sent from Podiatry clinic for surgical resection of 1st metatarsal of right foot.     - Hypertensive: /106;  - history of A fib s/p cardioversion, found to be in A fib on ECG not currently on AC at home; will discuss restarting on AC upon discharge   - TTE routine (can be done after surgery)   - Labs: CBC and BMP reviewed and within normal limits   - Chest X ray: no acute pulmonary process noted   - Cardiac Pre op risk score: 3.9% (class 1 risk)   - Patient cleared for surgery and is low risk for low risk procedure 70 yo M pm neuropaty 2/2 to back surgery, right foot ulcer, melanoma s/p surgery excision, hypertension, Afib s/p cardioversion not currently on AC, sent from Podiatry clinic for surgical resection of 1st metatarsal of right foot.     - Hypertensive: /106; on metoprolol 100 mg once a day at home   - history of A fib s/p cardioversion, found to be in A fib on ECG not currently on AC at home; will discuss restarting on AC upon discharge post op  - TTE routine (can be done after surgery)   - Labs: CBC and BMP reviewed and within normal limits   - Chest X ray: no acute pulmonary process noted   - Cardiac Pre op risk score: 3.9% (class 1 risk)   - Patient cleared for surgery and is low risk for low risk procedure 68 yo M pmh neuropaty 2/2 to back surgery, right foot ulcer, melanoma s/p surgery excision, hypertension, Afib s/p cardioversion not currently on AC, sent from Podiatry clinic for surgical resection of 1st metatarsal of right foot.     - Hypertensive: /106; on metoprolol 100 mg once a day at home   - Can start hydralazine 25 tid for more optimal BP control  - history of A fib s/p cardioversion, found to be in A fib on ECG not currently on AC at home; will discuss restarting on AC upon discharge post op as  his CHADSVASC score is 2  - TTE per routine (can be done after surgery)   - Labs: CBC and BMP reviewed and within normal limits   - Chest X ray: no acute pulmonary process noted   - Cardiac Pre op risk score: 3.9% (class 1 risk)   - Patient cleared for surgery and is low risk for low risk procedure

## 2020-08-18 NOTE — H&P ADULT - PROBLEM SELECTOR PLAN 3
- Continue home med, metoprolol - uncontrolled on admission  - monitor BPs  - Continue home med, metoprolol

## 2020-08-18 NOTE — H&P ADULT - NSHPREVIEWOFSYSTEMS_GEN_ALL_CORE
CONSTITUTIONAL: denies fever, chills, fatigue, weakness  HEENT: denies blurred vision, sore throat, wears glasses  SKIN: ulcer at base on R. foot  CARDIOVASCULAR: denies chest pain, chest pressure, palpitations  RESPIRATORY: denies shortness of breath, sputum production  GASTROINTESTINAL: denies nausea, vomiting, diarrhea, abdominal pain  GENITOURINARY: denies dysuria, discharge  NEUROLOGICAL: denies numbness, headache, focal weakness  MUSCULOSKELETAL: denies new joint pain, muscle aches  HEMATOLOGIC: denies gross bleeding, bruising  LYMPHATICS: denies enlarged lymph nodes, extremity swelling  PSYCHIATRIC: denies recent changes in anxiety, depression  ENDOCRINOLOGIC: denies sweating, cold or heat intolerance CONSTITUTIONAL: denies fever, chills, fatigue, weakness  HEENT: denies blurred vision, sore throat, wears glasses  SKIN: ulcer at base on R. foot  CARDIOVASCULAR: denies chest pain, chest pressure, palpitations  RESPIRATORY: denies shortness of breath, sputum production  GASTROINTESTINAL: denies nausea, vomiting, diarrhea, abdominal pain  GENITOURINARY: denies dysuria, discharge  NEUROLOGICAL: denies numbness, headache, focal weakness  MUSCULOSKELETAL: denies new joint pain, muscle aches  HEMATOLOGIC: denies gross bleeding, bruising  PSYCHIATRIC: denies recent changes in anxiety, depression  ENDOCRINOLOGIC: denies sweating, cold or heat intolerance CONSTITUTIONAL: denies fever, chills, fatigue, weakness  HEENT: denies blurred vision, sore throat, wears glasses  SKIN: ulcer at base on R. foot  CARDIOVASCULAR: denies chest pain, chest pressure, palpitations  RESPIRATORY: denies shortness of breath, sputum production  GASTROINTESTINAL: denies nausea, vomiting, diarrhea, abdominal pain  GENITOURINARY: denies dysuria, discharge  NEUROLOGICAL: denies numbness, headache, focal weakness  MUSCULOSKELETAL: reports swelling of R. foot  HEMATOLOGIC: denies gross bleeding, bruising  PSYCHIATRIC: denies recent changes in anxiety, depression  ENDOCRINOLOGIC: denies sweating, cold or heat intolerance
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
## 2020-08-18 NOTE — H&P ADULT - NSICDXPASTMEDICALHX_GEN_ALL_CORE_FT
PAST MEDICAL HISTORY:  Afib s/p cardioversion    Arthritis hx of multiple knee surgery    Hypertension     Melanocytic nevi of left ear s/p excision

## 2020-08-18 NOTE — H&P ADULT - ATTENDING COMMENTS
Labs, vitals reviewed  BPs elevated at this time. Pt is on metoprolol 100mg ER  daily (taken tody per patient).   Adding one time dose of amlodipine now  Will need better BP control before he is medically optimized.   Re-evaluate in AM

## 2020-08-18 NOTE — CONSULT NOTE ADULT - SUBJECTIVE AND OBJECTIVE BOX
HPI:  69y year old Male seen at [facility] 2NOR 230 D1 for right foot non healing wound to the right foot. Patient states he has had the wound since May 2020 and has been seeing Dr. Mcrae at Wound care Clinic. Patient states the wound has not been healing after conservative treatment and needs surgical intervention. 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 Patient denies any pain to his feet. Denies any fever, chills, nausea, vomiting, chest pain, shortness of breath, or calf pain at this time.    REVIEW OF SYSTEMS    PAST MEDICAL & SURGICAL HISTORY:  Afib: s/p cardioversion  Melanocytic nevi of left ear: s/p excision  Arthritis: hx of multiple knee surgery  Hypertension  History of skin cancer in adulthood: left ear lobe wide excision 7/2018  S/P foot surgery: right 2007  Ulnar nerve impingement: left 1996  S/P knee surgery: arthroscopy bilat knees - total 3 times - 2000, 2002, 2010  S/P carpal tunnel release: left 1996  S/P laminectomy: 1995      Allergies    No Known Allergies    Intolerances        MEDICATIONS  (STANDING):    MEDICATIONS  (PRN):  acetaminophen   Tablet .. 650 milliGRAM(s) Oral every 6 hours PRN Temp greater or equal to 38C (100.4F), Mild Pain (1 - 3), Moderate Pain (4 - 6)      Social History:  Denies ETOH use, smoking, recreational drug use.   Lives at home (18 Aug 2020 12:08)      FAMILY HISTORY:  No pertinent family history in first degree relatives      Vital Signs Last 24 Hrs  T(C): 36.4 (18 Aug 2020 12:21), Max: 36.7 (18 Aug 2020 08:10)  T(F): 97.6 (18 Aug 2020 12:21), Max: 98 (18 Aug 2020 08:10)  HR: 85 (18 Aug 2020 12:21) (85 - 86)  BP: 169/92 (18 Aug 2020 12:21) (137/86 - 169/92)  BP(mean): --  RR: 18 (18 Aug 2020 12:21) (16 - 18)  SpO2: 98% (18 Aug 2020 12:21) (98% - 98%)    PHYSICAL EXAM:  Vascular: DP & PT palpable bilaterally, Capillary refill 3 seconds, Digital hair present bilaterally. Non pitting edema noted to the right foot.   Neurological: Light touch sensation diminished to the level of the ankle bilaterally   Musculoskeletal: 5/5 strength in all quadrants bilaterally, AJ & STJ ROM intact, limited and pain free. Absence of left 2nd digit   Dermatological: Skin of bilateral lower extremity is warm to touch. Full thickness wound right foot 2.4cm x 2.0 cm x 2.5cm ulceration noted to the sub 1st metatarsal head, mild serous drainage,  fibrotic wound bed, (+)probe to bone, no periwound erythema, no fluctuance, no malodor, no proximal streaking at this time.    CBC Full  -  ( 18 Aug 2020 09:06 )  WBC Count : 6.15 K/uL  RBC Count : 4.87 M/uL  Hemoglobin : 15.1 g/dL  Hematocrit : 45.2 %  Platelet Count - Automated : 210 K/uL  Mean Cell Volume : 92.8 fl  Mean Cell Hemoglobin : 31.0 pg  Mean Cell Hemoglobin Concentration : 33.4 gm/dL  Auto Neutrophil # : 4.14 K/uL  Auto Lymphocyte # : 1.01 K/uL  Auto Monocyte # : 0.76 K/uL  Auto Eosinophil # : 0.18 K/uL  Auto Basophil # : 0.04 K/uL  Auto Neutrophil % : 67.3 %  Auto Lymphocyte % : 16.4 %  Auto Monocyte % : 12.4 %  Auto Eosinophil % : 2.9 %  Auto Basophil % : 0.7 %      08-18    140  |  110<H>  |  16  ----------------------------<  98  4.2   |  26  |  1.00    Ca    8.8      18 Aug 2020 09:06    TPro  7.3  /  Alb  3.5  /  TBili  0.5  /  DBili  x   /  AST  19  /  ALT  20  /  AlkPhos  75  08-18      PT/INR - ( 18 Aug 2020 09:06 )   PT: 12.5 sec;   INR: 1.07 ratio         PTT - ( 18 Aug 2020 09:06 )  PTT:29.9 sec        Imaging: ----------

## 2020-08-18 NOTE — PATIENT PROFILE ADULT - HARM RISK FACTORS
Gestational Age  33.6 (01 Oct 2017 16:43)    13d    Admission Diagnosis  HEALTH ISSUES - PROBLEM Dx:  On tube feeding diet: On tube feeding diet    infant of 33 completed weeks of gestation:   infant of 33 completed weeks of gestation          Growth Parameters:  Daily     Daily Weight Gm: 2055 (14 Oct 2017 00:00)  Head Circumference (cm): 31.5 (09 Oct 2017 01:00)      ICU Vital Signs Last 24 Hrs  T(C): 37 (14 Oct 2017 13:00), Max: 37.2 (13 Oct 2017 22:00)  T(F): 98.6 (14 Oct 2017 13:00), Max: 98.9 (13 Oct 2017 22:00)  HR: 160 (14 Oct 2017 13:00) (140 - 166)  BP: 61/32 (14 Oct 2017 10:00) (61/32 - 64/42)  BP(mean): 42 (14 Oct 2017 10:00) (42 - 48)  ABP: --  ABP(mean): --  RR: 40 (14 Oct 2017 13:00) (40 - 59)  SpO2: 98% (14 Oct 2017 13:00) (98% - 100%)      Physical Exam:  General: Awake and alert  Head: AFOP  Ears: Patent bilaterally, no deformities  Nose: Patent bilaterally  Neck: No masses, intact clavicles  Chest: No distress, air entry equal bilaterally  Cardio: +S1,S2, no murmurs noted. normal pulses palpable bilaterally  Abdomen: soft, non-tender, non-distended, no masses palpable  : Normal for gestational age  Spine: intact, no sacral dimple or tags  Anus:grossly patent  Extremities: FROM, no hip clicks  Neurological: Normal tone, moves all extremities symmetrically    Medications: PVS and Ferinsol    Enteral: all po 	  Type of milk:  SFEBM/Neosure ad garret    Total volume of feeds: 155     cc/kg/day  Urine Output:good    Neurology: no issues      Consults: nutrition  Opthalmology: ROP Screen no        MEDICATIONS  (STANDING):  ferrous sulfate Oral Liquid - Peds 8 milliGRAM(s) Elemental Iron Oral daily  hepatitis B IntraMuscular Vaccine (ENGERIX) - Peds 0.5 milliLiter(s) IntraMuscular once  multivitamin Oral Drops - Peds 1 milliLiter(s) Oral daily      Discharge Planning: for am  Hepatitis B vaccine: 10/14  Circumcision: 10/13  CHD Screen:  Hearing Screen:  Car Seat Test:  CPR Training:  Follow up program:  Other Follow up Appointments: yes

## 2020-08-18 NOTE — PATIENT PROFILE ADULT - DO YOU NEED ADDITIONAL SERVICES TO MANAGE ANY OF THESE MEDICAL CONDITIONS AT HOME?
" 05/29/18 0904   Quick Adds   Type of Visit Initial PT Evaluation   Living Environment   Lives With alone   Living Arrangements house   Home Accessibility stairs to enter home;stairs (1 railing present)   Number of Stairs to Enter Home 6   Number of Stairs Within Home 0   Stair Railings at Home inside, present on left side   Living Environment Comment Pt lives alone in house, brother nearby. Pt has walk-in shower, no adaptive equipment.   Self-Care   Dominant Hand right   Usual Activity Tolerance moderate   Current Activity Tolerance poor   Equipment Currently Used at Home cane, straight   Activity/Exercise/Self-Care Comment Pt previously IND-mod I with mobility and ADLs. Pt intermittently using SPC for amb, notably in community and reports negotiating stairs without diffficulty using 1 HR.    Functional Level Prior   Ambulation 1-->assistive equipment   Transferring 0-->independent   Toileting 0-->independent   Bathing 0-->independent   Dressing 0-->independent   Eating 0-->independent   Communication 0-->understands/communicates without difficulty   Swallowing 0-->swallows foods/liquids without difficulty   Cognition 0 - no cognition issues reported   Fall history within last six months no   Which of the above functional risks had a recent onset or change? ambulation;transferring;toileting;bathing;dressing   Prior Functional Level Comment Pt amb with SPC in community, otherwise IND with ADLs and transfers at baseline. Most recently admitted from TCU with increased knee pain/swelling. Increased difficulty with mobility 2/2 LE pain,   General Information   Onset of Illness/Injury or Date of Surgery - Date 05/29/18   Referring Physician  Wade Posey MD    Patient/Family Goals Statement Get home quickly   Pertinent History of Current Problem (include personal factors and/or comorbidities that impact the POC) Per chart review, pt is a \"72yo male 12 days s/p multi-level cervical decompression and fusion " "with right wrist pain and swelling and left knee pain and swelling and right wrist pain and swelling 2/2 gout (aspirates positive for monosodium urate crystals). 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 Please leave lights on to help compensate.   General Info Comments Activity orders: Up with A   Cognitive Status Examination   Orientation orientation to person, place and time   Level of Consciousness alert   Follows Commands and Answers Questions 100% of the time   Personal Safety and Judgment intact   Memory intact   Cognitive Comment Pt perseverant on 21-day coverage and need to be back home before then.   Pain Assessment   Patient Currently in Pain Yes, see Vital Sign flowsheet   Integumentary/Edema   Integumentary/Edema Comments Swelling at BUE/LE, more prominent at R wrist adn L knee/ankle.   Posture    Posture Forward head position;Protracted shoulders   Range of Motion (ROM)   ROM Comment Limited 2/2 pain at R hand and BLE. Tolerating knee flexion ~20 deg AAROM to position for rolling and ~ 60 deg darrell sitting EOB.   Strength   Strength Comments Decreased grossly at BLE, requires mod-max A for sit<>stand   Bed Mobility   Bed Mobility Comments Pt tx sup>sit at EOB with mod A and max VCs.   Transfer Skills   Transfer Comments Pt tx sit<>stand with max A from lowered bed height, VCs for forward weight shift.   Gait   Gait Comments Pt took a few steps forwards/backwards at EOB with FWW and min A for stability. Pt demos flat foot contact, small " "step length, and lateral trunk sway to clear feet darrell   Balance   Balance Comments Posterior trunk lean in standing, min A and VCs to correct. Pt requires FWW with all mobility OOB.   Sensory Examination   Sensory Perception Comments Diminished sensation grossly at BUE/LE, pt notes most significant numbness at R hand (difficulty feeling for bed, walker).    Modality Interventions   Planned Modality Interventions Cryotherapy   General Therapy Interventions   Planned Therapy Interventions ADL retraining;balance training;gait training;neuromuscular re-education;bed mobility training;strengthening;transfer training;ROM;home program guidelines;wheelchair management/propulsion training;progressive activity/exercise;risk factor education;stretching   Clinical Impression   Criteria for Skilled Therapeutic Intervention yes, treatment indicated   PT Diagnosis Impaired functional mobility   Influenced by the following impairments decreased strength, stability, sensation, ROM, endurance   Functional limitations due to impairments bed mobility, transfers, amb, stairs   Clinical Presentation Evolving/Changing   Clinical Presentation Rationale Pt with fluctuating pain, swelling, sensation and cognition impacting IND and participation with OOB mobility.    Clinical Decision Making (Complexity) Moderate complexity   Therapy Frequency` 2 times/day   Predicted Duration of Therapy Intervention (days/wks) 1 week   Anticipated Discharge Disposition Transitional Care Facility   Risk & Benefits of therapy have been explained Yes   Patient, Family & other staff in agreement with plan of care Yes   [facility] [employer] TM \"6 Clicks\"   2016, Trustees of [facility], under license to [employer].  All rights reserved.   6 Clicks Short Forms Basic Mobility Inpatient Short Form   [facility] [employer]  \"6 Clicks\" V.2 Basic Mobility Inpatient Short Form   1. Turning from your back to your side while in a flat bed without using " bedrails? 3 - A Little   2. Moving from lying on your back to sitting on the side of a flat bed without using bedrails? 2 - A Lot   3. Moving to and from a bed to a chair (including a wheelchair)? 2 - A Lot   4. Standing up from a chair using your arms (e.g., wheelchair, or bedside chair)? 2 - A Lot   5. To walk in hospital room? 2 - A Lot   6. Climbing 3-5 steps with a railing? 1 - Total   Basic Mobility Raw Score (Score out of 24.Lower scores equate to lower levels of function) 12   Total Evaluation Time   Total Evaluation Time (Minutes) 10      no

## 2020-08-18 NOTE — H&P ADULT - HISTORY OF PRESENT ILLNESS
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 foot ulcer, melanoma s/p surgical excision, hypertension sent to hospital due to concern for non healing ulcer per podiatry. Pt states he has been following at he wound clinic with Dr Mcrae (podiatry) for wound debridements. States he was advised that surgery would be needed as ulcer is not healing well. Denies fevers, chills, sick contacts, new pus/drainage, pain. Denies hx of diabetes mellitus.

## 2020-08-19 ENCOUNTER — RESULT REVIEW (OUTPATIENT)
Age: 70
End: 2020-08-19

## 2020-08-19 LAB
A1C WITH ESTIMATED AVERAGE GLUCOSE RESULT: 5.5 % — SIGNIFICANT CHANGE UP (ref 4–5.6)
ALBUMIN SERPL ELPH-MCNC: 3.1 G/DL — LOW (ref 3.3–5)
ALP SERPL-CCNC: 67 U/L — SIGNIFICANT CHANGE UP (ref 40–120)
ALT FLD-CCNC: 18 U/L — SIGNIFICANT CHANGE UP (ref 12–78)
ANION GAP SERPL CALC-SCNC: 7 MMOL/L — SIGNIFICANT CHANGE UP (ref 5–17)
AST SERPL-CCNC: 14 U/L — LOW (ref 15–37)
BILIRUB SERPL-MCNC: 0.7 MG/DL — SIGNIFICANT CHANGE UP (ref 0.2–1.2)
BUN SERPL-MCNC: 12 MG/DL — SIGNIFICANT CHANGE UP (ref 7–23)
CALCIUM SERPL-MCNC: 8.5 MG/DL — SIGNIFICANT CHANGE UP (ref 8.5–10.1)
CHLORIDE SERPL-SCNC: 110 MMOL/L — HIGH (ref 96–108)
CO2 SERPL-SCNC: 24 MMOL/L — SIGNIFICANT CHANGE UP (ref 22–31)
CREAT SERPL-MCNC: 0.83 MG/DL — SIGNIFICANT CHANGE UP (ref 0.5–1.3)
ESTIMATED AVERAGE GLUCOSE: 111 MG/DL — SIGNIFICANT CHANGE UP (ref 68–114)
GLUCOSE SERPL-MCNC: 80 MG/DL — SIGNIFICANT CHANGE UP (ref 70–99)
GRAM STN FLD: SIGNIFICANT CHANGE UP
HCT VFR BLD CALC: 42.3 % — SIGNIFICANT CHANGE UP (ref 39–50)
HGB BLD-MCNC: 14.3 G/DL — SIGNIFICANT CHANGE UP (ref 13–17)
MCHC RBC-ENTMCNC: 31.2 PG — SIGNIFICANT CHANGE UP (ref 27–34)
MCHC RBC-ENTMCNC: 33.8 GM/DL — SIGNIFICANT CHANGE UP (ref 32–36)
MCV RBC AUTO: 92.4 FL — SIGNIFICANT CHANGE UP (ref 80–100)
NRBC # BLD: 0 /100 WBCS — SIGNIFICANT CHANGE UP (ref 0–0)
PLATELET # BLD AUTO: 210 K/UL — SIGNIFICANT CHANGE UP (ref 150–400)
POTASSIUM SERPL-MCNC: 3.8 MMOL/L — SIGNIFICANT CHANGE UP (ref 3.5–5.3)
POTASSIUM SERPL-SCNC: 3.8 MMOL/L — SIGNIFICANT CHANGE UP (ref 3.5–5.3)
PROT SERPL-MCNC: 6.7 G/DL — SIGNIFICANT CHANGE UP (ref 6–8.3)
RBC # BLD: 4.58 M/UL — SIGNIFICANT CHANGE UP (ref 4.2–5.8)
RBC # FLD: 13.2 % — SIGNIFICANT CHANGE UP (ref 10.3–14.5)
SARS-COV-2 IGG SERPL QL IA: NEGATIVE — SIGNIFICANT CHANGE UP
SARS-COV-2 IGM SERPL IA-ACNC: 0.01 INDEX — SIGNIFICANT CHANGE UP
SODIUM SERPL-SCNC: 141 MMOL/L — SIGNIFICANT CHANGE UP (ref 135–145)
SPECIMEN SOURCE: SIGNIFICANT CHANGE UP
WBC # BLD: 4.45 K/UL — SIGNIFICANT CHANGE UP (ref 3.8–10.5)
WBC # FLD AUTO: 4.45 K/UL — SIGNIFICANT CHANGE UP (ref 3.8–10.5)

## 2020-08-19 PROCEDURE — 73630 X-RAY EXAM OF FOOT: CPT | Mod: 26,RT

## 2020-08-19 PROCEDURE — 28122 PARTIAL REMOVAL OF FOOT BONE: CPT | Mod: T5

## 2020-08-19 PROCEDURE — 88311 DECALCIFY TISSUE: CPT | Mod: 26

## 2020-08-19 PROCEDURE — 88304 TISSUE EXAM BY PATHOLOGIST: CPT | Mod: 26

## 2020-08-19 PROCEDURE — 99232 SBSQ HOSP IP/OBS MODERATE 35: CPT

## 2020-08-19 RX ORDER — ACETAMINOPHEN 500 MG
1000 TABLET ORAL ONCE
Refills: 0 | Status: DISCONTINUED | OUTPATIENT
Start: 2020-08-19 | End: 2020-08-19

## 2020-08-19 RX ORDER — SODIUM CHLORIDE 9 MG/ML
1000 INJECTION, SOLUTION INTRAVENOUS
Refills: 0 | Status: DISCONTINUED | OUTPATIENT
Start: 2020-08-19 | End: 2020-08-19

## 2020-08-19 RX ORDER — LANOLIN ALCOHOL/MO/W.PET/CERES
5 CREAM (GRAM) TOPICAL AT BEDTIME
Refills: 0 | Status: DISCONTINUED | OUTPATIENT
Start: 2020-08-19 | End: 2020-08-21

## 2020-08-19 RX ORDER — CEFAZOLIN SODIUM 1 G
2000 VIAL (EA) INJECTION ONCE
Refills: 0 | Status: COMPLETED | OUTPATIENT
Start: 2020-08-19 | End: 2020-08-19

## 2020-08-19 RX ORDER — ACETAMINOPHEN 500 MG
650 TABLET ORAL EVERY 6 HOURS
Refills: 0 | Status: DISCONTINUED | OUTPATIENT
Start: 2020-08-19 | End: 2020-08-21

## 2020-08-19 RX ORDER — METOPROLOL TARTRATE 50 MG
100 TABLET ORAL DAILY
Refills: 0 | Status: DISCONTINUED | OUTPATIENT
Start: 2020-08-19 | End: 2020-08-21

## 2020-08-19 RX ORDER — ERGOCALCIFEROL 1.25 MG/1
50000 CAPSULE ORAL
Refills: 0 | Status: DISCONTINUED | OUTPATIENT
Start: 2020-08-19 | End: 2020-08-21

## 2020-08-19 RX ORDER — HYDROMORPHONE HYDROCHLORIDE 2 MG/ML
0.5 INJECTION INTRAMUSCULAR; INTRAVENOUS; SUBCUTANEOUS
Refills: 0 | Status: DISCONTINUED | OUTPATIENT
Start: 2020-08-19 | End: 2020-08-19

## 2020-08-19 RX ORDER — ONDANSETRON 8 MG/1
4 TABLET, FILM COATED ORAL ONCE
Refills: 0 | Status: DISCONTINUED | OUTPATIENT
Start: 2020-08-19 | End: 2020-08-19

## 2020-08-19 RX ORDER — HYDRALAZINE HCL 50 MG
10 TABLET ORAL ONCE
Refills: 0 | Status: COMPLETED | OUTPATIENT
Start: 2020-08-19 | End: 2020-08-19

## 2020-08-19 RX ADMIN — Medication 10 MILLIGRAM(S): at 09:57

## 2020-08-19 RX ADMIN — SODIUM CHLORIDE 75 MILLILITER(S): 9 INJECTION, SOLUTION INTRAVENOUS at 12:35

## 2020-08-19 RX ADMIN — Medication 5 MILLIGRAM(S): at 22:41

## 2020-08-19 RX ADMIN — Medication 650 MILLIGRAM(S): at 22:41

## 2020-08-19 RX ADMIN — ERGOCALCIFEROL 50000 UNIT(S): 1.25 CAPSULE ORAL at 15:35

## 2020-08-19 RX ADMIN — Medication 100 MILLIGRAM(S): at 05:41

## 2020-08-19 NOTE — PROGRESS NOTE ADULT - ASSESSMENT
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     Hypertension  - BP: 141/85 (08-19-20 @ 05:27) (141/85 - 172/106)  - On metoprolol  mg once a day at home, Continue BB  - Can start hydralazine 25 tid for more optimal BP control  - Monitor routine hemodynamics     Atrial Fibrillation   - Rate controlled at present HR: 87 (08-19 @ 05:27) (80 - 87) per flow sheets   - Continue Toprol    - Patient has history of A fib s/p cardioversion, was not on AC as he was in NSR, found to be in A fib on admission ECG; will discuss restarting on AC upon discharge post op as  his CHADSVASC score is 2. Likely to start Eliquis post op.   - TTE per routine (can be done after surgery)     Surgical resection of 1st metatarsal of right foot.   - Chest X ray: no acute pulmonary process noted   - Cardiac Pre op risk score: 3.9% (class 1 risk)   - Stress test on 03/2019 showed small, mild defect in the mid to distal inferolateral wall that is reversible consistent with ischemia, possibly consistent with small area of ischemia. Patient clinically asymptomatic. Denies any chest pain, SOB, SALMON, orthopnea, PND, exertional chest pain or SOB. Patient will be at minimal risk, given A fib and small ischemia on stress. But given the area of ischemia is small, can proceed with surgery from a cardiac point of view. Patient with no evidence of decompensated heart failure, severe obstructive valvular disease, or uncontrolled arrhythmia.    - Monitor and replete lytes, keep K>4, Mg>2.  - All other medical needs as per primary team.  - Other cardiovascular workup will depend on clinical course.  - Will continue to follow.    Nick Tenorio MS FNP, [facility]  Nurse Practitioner- Cardiology   Spectra #[phone]

## 2020-08-19 NOTE — PROGRESS NOTE ADULT - ASSESSMENT
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     XRAY:  Right foot:  Comparison: Right foot 4/17/2019.  Again noted are postsurgical resections of the first second and third rays as well as fusion at the interphalangeal joint of the great toe.  No focal osteolysis is noted.  There are degenerative changes at the tarsometatarsal articulations, progressed since prior study.  The alignment at the tarsometatarsal joints remains within normal limits.      Left foot:  No comparison.  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  There are postsurgical changes/resection at the first metatarsal head and degenerative changes at the metatarsophalangeal joint.  There is lateral subluxation at the interphalangeal joint of the great toe with diffuse joint space narrowing.  There is claw toe deformity at the DIP joint of the toe.  No acute fracture or focal osteolysis is noted.  Calcaneal enthesopathy is present.

## 2020-08-19 NOTE — PROGRESS NOTE ADULT - SUBJECTIVE AND OBJECTIVE BOX
Patient is a 69y old  Male who presents with a chief complaint of Nonhealing R. foot ulcer (18 Aug 2020 16:45)      INTERVAL HPI/OVERNIGHT EVENTS: Patient seen and examined. Doing well this AM. Feels fine. No CP/SOB/palpitations. No N/V/D. BP better controlled now.     MEDICATIONS  (STANDING):  ergocalciferol 76171 Unit(s) Oral every week  metoprolol succinate  milliGRAM(s) Oral daily    MEDICATIONS  (PRN):  acetaminophen   Tablet .. 650 milliGRAM(s) Oral every 6 hours PRN Temp greater or equal to 38C (100.4F), Mild Pain (1 - 3), Moderate Pain (4 - 6)      Allergies    No Known Allergies      REVIEW OF SYSTEMS:  CONSTITUTIONAL: No fever or chills  HEENT:  No headache, no sore throat  RESPIRATORY: No cough, wheezing, or shortness of breath  CARDIOVASCULAR: No chest pain, palpitations  GASTROINTESTINAL: No abd pain, nausea, vomiting, or diarrhea  GENITOURINARY: No dysuria, frequency, or hematuria  NEUROLOGICAL: no focal weakness or dizziness  MUSCULOSKELETAL: no myalgias     Vital Signs Last 24 Hrs  T(C): 36.4 (19 Aug 2020 05:27), Max: 36.4 (18 Aug 2020 12:21)  T(F): 97.5 (19 Aug 2020 05:27), Max: 97.6 (18 Aug 2020 12:21)  HR: 87 (19 Aug 2020 05:27) (80 - 87)  BP: 141/85 (19 Aug 2020 05:27) (141/85 - 172/106)  BP(mean): --  RR: 16 (19 Aug 2020 05:27) (16 - 19)  SpO2: 96% (19 Aug 2020 05:27) (96% - 100%)    PHYSICAL EXAM:  GENERAL: NAD  HEENT:  anicteric, moist mucous membranes  CHEST/LUNG:  CTA b/l, no rales, wheezes, or rhonchi  HEART:  RRR, S1, S2  ABDOMEN:  BS+, soft, nontender, nondistended  EXTREMITIES: +right foot in bulky dressing, +varicose veins, no edema, cyanosis, or calf tenderness  NERVOUS SYSTEM: answers questions and follows commands appropriately    LABS:                        14.3   4.45  )-----------( 210      ( 19 Aug 2020 07:17 )             42.3     CBC Full  -  ( 19 Aug 2020 07:17 )  WBC Count : 4.45 K/uL  Hemoglobin : 14.3 g/dL  Hematocrit : 42.3 %  Platelet Count - Automated : 210 K/uL  Mean Cell Volume : 92.4 fl  Mean Cell Hemoglobin : 31.2 pg  Mean Cell Hemoglobin Concentration : 33.8 gm/dL  Auto Neutrophil # : x  Auto Lymphocyte # : x  Auto Monocyte # : x  Auto Eosinophil # : x  Auto Basophil # : x  Auto Neutrophil % : x  Auto Lymphocyte % : x  Auto Monocyte % : x  Auto Eosinophil % : x  Auto Basophil % : x    19 Aug 2020 07:17    141    |  110    |  12     ----------------------------<  80     3.8     |  24     |  0.83     Ca    8.5        19 Aug 2020 07:17    TPro  6.7    /  Alb  3.1    /  TBili  0.7    /  DBili  x      /  AST  14     /  ALT  18     /  AlkPhos  67     19 Aug 2020 07:17    PT/INR - ( 18 Aug 2020 09:06 )   PT: 12.5 sec;   INR: 1.07 ratio         PTT - ( 18 Aug 2020 09:06 )  PTT:29.9 sec    CAPILLARY BLOOD GLUCOSE              RADIOLOGY & ADDITIONAL TESTS:    Personally reviewed.     Consultant(s) Notes Reviewed:  [x] YES  [ ] NO

## 2020-08-19 NOTE — BRIEF OPERATIVE NOTE - SPECIMENS
1) Right foot first metatarsal pathology, 2) right foot first metatarsal bone culture 3) right foot first metatarsal proximal clean margin pathology

## 2020-08-19 NOTE — PROGRESS NOTE ADULT - SUBJECTIVE AND OBJECTIVE BOX
[facility] -- Karla Reed, Santos, Fredis, Thomas Echevarria Savella, Goodger: Office # [phone]    Follow Up:  Cardiac optimization pre/post op, A fib    Subjective/Observations: Patient seen and examined. Patient awake and alert, resting comfortably in chair. Tolerating room air. Plan for surgery today 10 am. No complaints of chest pain, SOB, LE edema, cough. No signs of orthopnea or PND.    REVIEW OF SYSTEMS: All review of systems is negative for eye, ENT, GI, , allergic, dermatologic, musculoskeletal and neurologic except as described above    PAST MEDICAL & SURGICAL HISTORY:  Afib: s/p cardioversion  Melanocytic nevi of left ear: s/p excision  Arthritis: hx of multiple knee surgery  Hypertension  History of skin cancer in adulthood: left ear lobe wide excision 7/2018  S/P foot surgery: right 2007  Ulnar nerve impingement: left 1996  S/P knee surgery: arthroscopy bilat knees - total 3 times - 2000, 2002, 2010  S/P carpal tunnel release: left 1996  S/P laminectomy: 1995    MEDICATIONS  (STANDING):  ergocalciferol 87902 Unit(s) Oral every week  metoprolol succinate  milliGRAM(s) Oral daily    MEDICATIONS  (PRN):  acetaminophen   Tablet .. 650 milliGRAM(s) Oral every 6 hours PRN Temp greater or equal to 38C (100.4F), Mild Pain (1 - 3), Moderate Pain (4 - 6)    Allergies  No Known Allergies    Vital Signs Last 24 Hrs  T(C): 36.4 (19 Aug 2020 05:27), Max: 36.4 (18 Aug 2020 12:21)  T(F): 97.5 (19 Aug 2020 05:27), Max: 97.6 (18 Aug 2020 12:21)  HR: 87 (19 Aug 2020 05:27) (80 - 87)  BP: 141/85 (19 Aug 2020 05:27) (141/85 - 172/106)  BP(mean): --  RR: 16 (19 Aug 2020 05:27) (16 - 19)  SpO2: 96% (19 Aug 2020 05:27) (96% - 100%)    I&O's Summary      TELE: Not on telemetry   PHYSICAL EXAM:  Appearance: NAD, no distress, alert, Well developed   HEENT: Moist Mucous Membranes, Anicteric  Cardiovascular: Regular rate and rhythm, Normal S1 S2, No JVD, No murmurs, No rubs, gallops or clicks  Respiratory: Non-labored, Clear to auscultation, No rales, No rhonchi, No wheezing.   Gastrointestinal:  Soft, Non-tender, + BS  Neurologic: Non-focal  Skin: Warm and dry, Rt foot DSD intact, No ecchymosis, No cyanosis  Musculoskeletal: No clubbing, No cyanosis, No joint swelling/tenderness  Psychiatry: Mood & affect appropriate  Lymph: No peripheral edema.     LABS: All Labs Reviewed:                        14.3   4.45  )-----------( 210      ( 19 Aug 2020 07:17 )             42.3                         15.1   6.15  )-----------( 210      ( 18 Aug 2020 09:06 )             45.2     19 Aug 2020 07:17    141    |  110    |  12     ----------------------------<  80     3.8     |  24     |  0.83   18 Aug 2020 09:06    140    |  110    |  16     ----------------------------<  98     4.2     |  26     |  1.00     Ca    8.5        19 Aug 2020 07:17  Ca    8.8        18 Aug 2020 09:06    TPro  6.7    /  Alb  3.1    /  TBili  0.7    /  DBili  x      /  AST  14     /  ALT  18     /  AlkPhos  67     19 Aug 2020 07:17  TPro  7.3    /  Alb  3.5    /  TBili  0.5    /  DBili  x      /  AST  19     /  ALT  20     /  AlkPhos  75     18 Aug 2020 09:06    PT/INR - ( 18 Aug 2020 09:06 )   PT: 12.5 sec;   INR: 1.07 ratio    PTT - ( 18 Aug 2020 09:06 )  PTT:29.9 sec  Lactate, Blood: 0.7 mmol/L (08-18-20 @ 09:06)    12 Lead ECG:   Ventricular Rate 75 BPM  Atrial Rate 340 BPM  QRS Duration 102 ms  Q-T Interval 376 ms  QTC Calculation(Bezet) 419 ms  R Axis 31 degrees  T Axis 25 degrees  Diagnosis Line Atrial fibrillation  Confirmed by JULIANO ECHEVARRIA ([id number]) on 8/18/2020 2:46:10 PM (08-18-20 @ 09:47)    < from: Nuclear Stress Test-Pharmacologic (03.17.19 @ 11:35) >  IMPRESSIONS:Abnormal Study  * Chest Pain: No chest pain with administration of Regadenoson.  * Symptom: No Symptom.  * HR Response: Appropriate.  * BP Response: Appropriate.  * Heart Rhythm: Atrial Fibrillation - 83 BPM.  * Conduction defects: Early Repolarization.  * ECG Abnormalities: None.  * Arrhythmia: None.  * There is a small, mild defect in the mid to distal infero-lateral wall that is reversible consistent with ischemia.  * Post-stress gated wall motion analysis was performed (LVEF = 59 %;LVEDV = 104 ml.), revealing mild hypokinesis in distal inferolateral wall(s).  Conclusion:  There is a small, mild defect in the mid to distal infero-lateral wall that is reversible consistent with ischemia.  < end of copied text >

## 2020-08-19 NOTE — BRIEF OPERATIVE NOTE - COMMENTS
Patient tolerated the procedure and anesthesia well without any complications. Patient was transported to PACU with all vital signs stable and neurovascular status intact to the right foot

## 2020-08-20 LAB
ANION GAP SERPL CALC-SCNC: 6 MMOL/L — SIGNIFICANT CHANGE UP (ref 5–17)
BUN SERPL-MCNC: 11 MG/DL — SIGNIFICANT CHANGE UP (ref 7–23)
CALCIUM SERPL-MCNC: 8.4 MG/DL — LOW (ref 8.5–10.1)
CHLORIDE SERPL-SCNC: 108 MMOL/L — SIGNIFICANT CHANGE UP (ref 96–108)
CO2 SERPL-SCNC: 26 MMOL/L — SIGNIFICANT CHANGE UP (ref 22–31)
CREAT SERPL-MCNC: 0.79 MG/DL — SIGNIFICANT CHANGE UP (ref 0.5–1.3)
GLUCOSE SERPL-MCNC: 99 MG/DL — SIGNIFICANT CHANGE UP (ref 70–99)
HCT VFR BLD CALC: 39.7 % — SIGNIFICANT CHANGE UP (ref 39–50)
HGB BLD-MCNC: 13.3 G/DL — SIGNIFICANT CHANGE UP (ref 13–17)
MCHC RBC-ENTMCNC: 30.6 PG — SIGNIFICANT CHANGE UP (ref 27–34)
MCHC RBC-ENTMCNC: 33.5 GM/DL — SIGNIFICANT CHANGE UP (ref 32–36)
MCV RBC AUTO: 91.3 FL — SIGNIFICANT CHANGE UP (ref 80–100)
NRBC # BLD: 0 /100 WBCS — SIGNIFICANT CHANGE UP (ref 0–0)
PLATELET # BLD AUTO: 208 K/UL — SIGNIFICANT CHANGE UP (ref 150–400)
POTASSIUM SERPL-MCNC: 3.7 MMOL/L — SIGNIFICANT CHANGE UP (ref 3.5–5.3)
POTASSIUM SERPL-SCNC: 3.7 MMOL/L — SIGNIFICANT CHANGE UP (ref 3.5–5.3)
RBC # BLD: 4.35 M/UL — SIGNIFICANT CHANGE UP (ref 4.2–5.8)
RBC # FLD: 13.3 % — SIGNIFICANT CHANGE UP (ref 10.3–14.5)
SODIUM SERPL-SCNC: 140 MMOL/L — SIGNIFICANT CHANGE UP (ref 135–145)
WBC # BLD: 6.34 K/UL — SIGNIFICANT CHANGE UP (ref 3.8–10.5)
WBC # FLD AUTO: 6.34 K/UL — SIGNIFICANT CHANGE UP (ref 3.8–10.5)

## 2020-08-20 PROCEDURE — 93306 TTE W/DOPPLER COMPLETE: CPT | Mod: 26

## 2020-08-20 PROCEDURE — 99232 SBSQ HOSP IP/OBS MODERATE 35: CPT

## 2020-08-20 PROCEDURE — 99024 POSTOP FOLLOW-UP VISIT: CPT

## 2020-08-20 RX ORDER — CEFAZOLIN SODIUM 1 G
2000 VIAL (EA) INJECTION EVERY 8 HOURS
Refills: 0 | Status: DISCONTINUED | OUTPATIENT
Start: 2020-08-20 | End: 2020-08-21

## 2020-08-20 RX ORDER — RIVAROXABAN 15 MG-20MG
20 KIT ORAL
Refills: 0 | Status: DISCONTINUED | OUTPATIENT
Start: 2020-08-20 | End: 2020-08-21

## 2020-08-20 RX ORDER — ENOXAPARIN SODIUM 100 MG/ML
40 INJECTION SUBCUTANEOUS EVERY 24 HOURS
Refills: 0 | Status: DISCONTINUED | OUTPATIENT
Start: 2020-08-20 | End: 2020-08-20

## 2020-08-20 RX ADMIN — Medication 650 MILLIGRAM(S): at 06:38

## 2020-08-20 RX ADMIN — Medication 650 MILLIGRAM(S): at 20:20

## 2020-08-20 RX ADMIN — Medication 650 MILLIGRAM(S): at 19:36

## 2020-08-20 RX ADMIN — Medication 100 MILLIGRAM(S): at 21:46

## 2020-08-20 RX ADMIN — RIVAROXABAN 20 MILLIGRAM(S): KIT at 16:46

## 2020-08-20 RX ADMIN — Medication 100 MILLIGRAM(S): at 13:57

## 2020-08-20 RX ADMIN — Medication 100 MILLIGRAM(S): at 06:38

## 2020-08-20 NOTE — PROGRESS NOTE ADULT - ASSESSMENT
Patient is a 69 year old male s/p right first metatarsal resection (DOS: 8/19/2020).   Patient examined and evaluated.  Mild sanguinous drainage noted to the sterile gauze over the surgical site   Surgical site dressed with adaptic and dry, sterile dressing.  Patient is to be partial weight bearing to the heel of the surgical lower extremity as tolerated in a surgical shoe.  Surgical pathology and cultures pending at this time.  Antibiotics as per ID recommendations.  Will follow patient

## 2020-08-20 NOTE — PROGRESS NOTE ADULT - SUBJECTIVE AND OBJECTIVE BOX
INCOMPLETE NOTE   HPI:     Patient is a 69y year old Male seen at [facility] 2NOR 230 D1 s/p right first metatarsal resection POD# 1 (DOS: 8/19/2020). Patient Denies any fever, chills, nausea, vomiting, chest pain, shortness of breath, or calf pain at this time.    Allergies    No Known Allergies    Intolerances        MEDICATIONS  (STANDING):  ergocalciferol 19450 Unit(s) Oral every week  metoprolol succinate  milliGRAM(s) Oral daily    MEDICATIONS  (PRN):  acetaminophen   Tablet .. 650 milliGRAM(s) Oral every 6 hours PRN Temp greater or equal to 38C (100.4F), Mild Pain (1 - 3), Moderate Pain (4 - 6)  melatonin 5 milliGRAM(s) Oral at bedtime PRN Insomnia      Vital Signs Last 24 Hrs  T(C): 36.8 (20 Aug 2020 05:22), Max: 37.8 (19 Aug 2020 21:19)  T(F): 98.2 (20 Aug 2020 05:22), Max: 100 (19 Aug 2020 21:19)  HR: 81 (20 Aug 2020 05:22) (78 - 90)  BP: 141/77 (20 Aug 2020 05:22) (111/76 - 175/84)  BP(mean): --  RR: 18 (20 Aug 2020 05:22) (13 - 18)  SpO2: 95% (20 Aug 2020 05:22) (95% - 100%)    PHYSICAL EXAM:  Vascular: ----------  Neurological: ----------  Musculoskeletal: ----------  Dermatological: ----------    CBC Full  -  ( 20 Aug 2020 06:44 )  WBC Count : 6.34 K/uL  RBC Count : 4.35 M/uL  Hemoglobin : 13.3 g/dL  Hematocrit : 39.7 %  Platelet Count - Automated : 208 K/uL  Mean Cell Volume : 91.3 fl  Mean Cell Hemoglobin : 30.6 pg  Mean Cell Hemoglobin Concentration : 33.5 gm/dL  Auto Neutrophil # : x  Auto Lymphocyte # : x  Auto Monocyte # : x  Auto Eosinophil # : x  Auto Basophil # : x  Auto Neutrophil % : x  Auto Lymphocyte % : x  Auto Monocyte % : x  Auto Eosinophil % : x  Auto Basophil % : x    08-20    140  |  108  |  11  ----------------------------<  99  3.7   |  26  |  0.79    Ca    8.4<L>      20 Aug 2020 06:44    TPro  6.7  /  Alb  3.1<L>  /  TBili  0.7  /  DBili  x   /  AST  14<L>  /  ALT  18  /  AlkPhos  67  08-19            Culture - Tissue with Gram Stain (collected 19 Aug 2020 17:43)  Source: .Tissue Other, right foot  Gram Stain (19 Aug 2020 23:49):    No polymorphonuclear cells seen per low power field    No organisms seen per oil power field    Culture - Blood (collected 18 Aug 2020 11:37)  Source: .Blood Blood  Preliminary Report (19 Aug 2020 12:02):    No growth to date.    Culture - Blood (collected 18 Aug 2020 11:37)  Source: .Blood Blood  Preliminary Report (19 Aug 2020 12:02):    No growth to date.        Imaging: ----------  < from: Xray Foot AP + Lateral + Oblique, Right (08.19.20 @ 13:01) >  EXAM:  FOOT RIGHT (MINIMUM 3 VIEWS)                            PROCEDURE DATE:  08/19/2020          INTERPRETATION:  Radiographs of the right foot    CLINICAL INFORMATION:  Postop    TECHNIQUE:  Frontal, oblique and lateral views of the foot were obtained.    FINDINGS:   The study of 6/10/2020 is available for review.    The forefoot is remarkable for further amputation of the first metatarsal with postoperative changes in the soft tissue at this level. Screw is again seen through the phalangesof the great toe. Previous second and third metatarsal osteotomies are again appreciated.    The hindfoot demonstrates a small plantar calcaneal spur.    There is ulcer on the plantar surface of the forefoot    IMPRESSION:   Postoperative changes with amputation at the proximal first metatarsal level. Plantar ulcer.        < end of copied text > HPI:     Patient is a 69y year old Male seen at [facility] [address] 230 D1 s/p right first metatarsal resection POD# 1 (DOS: 8/19/2020). Patient states he has been tolerating diet well and without any complications. Patient has been urinating and has had a bowel movement post op. Patient denies any pain to the right foot. Denies any fever, chills, nausea, vomiting, chest pain, shortness of breath, or calf pain at this time.    Allergies    No Known Allergies    Intolerances        MEDICATIONS  (STANDING):  ergocalciferol 67758 Unit(s) Oral every week  metoprolol succinate  milliGRAM(s) Oral daily    MEDICATIONS  (PRN):  acetaminophen   Tablet .. 650 milliGRAM(s) Oral every 6 hours PRN Temp greater or equal to 38C (100.4F), Mild Pain (1 - 3), Moderate Pain (4 - 6)  melatonin 5 milliGRAM(s) Oral at bedtime PRN Insomnia      Vital Signs Last 24 Hrs  T(C): 36.8 (20 Aug 2020 05:22), Max: 37.8 (19 Aug 2020 21:19)  T(F): 98.2 (20 Aug 2020 05:22), Max: 100 (19 Aug 2020 21:19)  HR: 81 (20 Aug 2020 05:22) (78 - 90)  BP: 141/77 (20 Aug 2020 05:22) (111/76 - 175/84)  BP(mean): --  RR: 18 (20 Aug 2020 05:22) (13 - 18)  SpO2: 95% (20 Aug 2020 05:22) (95% - 100%)    PHYSICAL EXAM:  Vascular: DP & PT palpable bilaterally, Capillary refill 3 seconds, Digital hair present bilaterally. Non pitting edema noted to the right foot.   Neurological: Light touch sensation diminished to the level of the ankle bilaterally   Musculoskeletal: 5/5 strength in all quadrants bilaterally, AJ & STJ ROM intact, limited and pain free. Absence of left 2nd digit   Dermatological: Skin of bilateral lower extremity is warm to touch. Full thickness wound right foot 2.4cm x 2.0 cm x 2.5cm ulceration noted to the sub 1st metatarsal head, mild serous drainage,  fibrotic wound bed, (+)probe to bone, no periwound erythema, no fluctuance, no malodor, no proximal streaking at this time.    CBC Full  -  ( 20 Aug 2020 06:44 )  WBC Count : 6.34 K/uL  RBC Count : 4.35 M/uL  Hemoglobin : 13.3 g/dL  Hematocrit : 39.7 %  Platelet Count - Automated : 208 K/uL  Mean Cell Volume : 91.3 fl  Mean Cell Hemoglobin : 30.6 pg  Mean Cell Hemoglobin Concentration : 33.5 gm/dL  Auto Neutrophil # : x  Auto Lymphocyte # : x  Auto Monocyte # : x  Auto Eosinophil # : x  Auto Basophil # : x  Auto Neutrophil % : x  Auto Lymphocyte % : x  Auto Monocyte % : x  Auto Eosinophil % : x  Auto Basophil % : x    08-20    140  |  108  |  11  ----------------------------<  99  3.7   |  26  |  0.79    Ca    8.4<L>      20 Aug 2020 06:44    TPro  6.7  /  Alb  3.1<L>  /  TBili  0.7  /  DBili  x   /  AST  14<L>  /  ALT  18  /  AlkPhos  67  08-19            Culture - Tissue with Gram Stain (collected 19 Aug 2020 17:43)  Source: .Tissue Other, right foot  Gram Stain (19 Aug 2020 23:49):    No polymorphonuclear cells seen per low power field    No organisms seen per oil power field    Culture - Blood (collected 18 Aug 2020 11:37)  Source: .Blood Blood  Preliminary Report (19 Aug 2020 12:02):    No growth to date.    Culture - Blood (collected 18 Aug 2020 11:37)  Source: .Blood Blood  Preliminary Report (19 Aug 2020 12:02):    No growth to date.        Imaging: ----------  < from: Xray Foot AP + Lateral + Oblique, Right (08.19.20 @ 13:01) >  EXAM:  FOOT RIGHT (MINIMUM 3 VIEWS)                            PROCEDURE DATE:  08/19/2020          INTERPRETATION:  Radiographs of the right foot    CLINICAL INFORMATION:  Postop    TECHNIQUE:  Frontal, oblique and lateral views of the foot were obtained.    FINDINGS:   The study of 6/10/2020 is available for review.    The forefoot is remarkable for further amputation of the first metatarsal with postoperative changes in the soft tissue at this level. Screw is again seen through the phalangesof the great toe. Previous second and third metatarsal osteotomies are again appreciated.    The hindfoot demonstrates a small plantar calcaneal spur.    There is ulcer on the plantar surface of the forefoot    IMPRESSION:   Postoperative changes with amputation at the proximal first metatarsal level. Plantar ulcer.        < end of copied text > HPI:     Patient is a 69y year old Male seen at [facility] [address] D1 s/p right first metatarsal resection POD# 1 (DOS: 8/19/2020). Patient states he has been tolerating diet well and without any complications. Patient has been urinating and has had a bowel movement post op. Patient denies any pain to the right foot. Denies any fever, chills, nausea, vomiting, chest pain, shortness of breath, or calf pain at this time.    Allergies    No Known Allergies    Intolerances        MEDICATIONS  (STANDING):  ergocalciferol 87124 Unit(s) Oral every week  metoprolol succinate  milliGRAM(s) Oral daily    MEDICATIONS  (PRN):  acetaminophen   Tablet .. 650 milliGRAM(s) Oral every 6 hours PRN Temp greater or equal to 38C (100.4F), Mild Pain (1 - 3), Moderate Pain (4 - 6)  melatonin 5 milliGRAM(s) Oral at bedtime PRN Insomnia      Vital Signs Last 24 Hrs  T(C): 36.8 (20 Aug 2020 05:22), Max: 37.8 (19 Aug 2020 21:19)  T(F): 98.2 (20 Aug 2020 05:22), Max: 100 (19 Aug 2020 21:19)  HR: 81 (20 Aug 2020 05:22) (78 - 90)  BP: 141/77 (20 Aug 2020 05:22) (111/76 - 175/84)  BP(mean): --  RR: 18 (20 Aug 2020 05:22) (13 - 18)  SpO2: 95% (20 Aug 2020 05:22) (95% - 100%)    PHYSICAL EXAM:  Vascular: DP & PT palpable bilaterally, Capillary refill 3 seconds, Digital hair present bilaterally. Non pitting edema noted to the right foot.   Neurological: Light touch sensation diminished to the level of the ankle bilaterally   Musculoskeletal: 5/5 strength in all quadrants bilaterally, AJ & STJ ROM intact, limited and pain free. Absence of left 2nd digit   Dermatological: Skin of bilateral lower extremity is warm to touch. Surtures intact to the right foot surgical site with no dehiscence, no drainage, mild erythema noted to the dorsal foot, no proximal streaking        CBC Full  -  ( 20 Aug 2020 06:44 )  WBC Count : 6.34 K/uL  RBC Count : 4.35 M/uL  Hemoglobin : 13.3 g/dL  Hematocrit : 39.7 %  Platelet Count - Automated : 208 K/uL  Mean Cell Volume : 91.3 fl  Mean Cell Hemoglobin : 30.6 pg  Mean Cell Hemoglobin Concentration : 33.5 gm/dL  Auto Neutrophil # : x  Auto Lymphocyte # : x  Auto Monocyte # : x  Auto Eosinophil # : x  Auto Basophil # : x  Auto Neutrophil % : x  Auto Lymphocyte % : x  Auto Monocyte % : x  Auto Eosinophil % : x  Auto Basophil % : x    08-20    140  |  108  |  11  ----------------------------<  99  3.7   |  26  |  0.79    Ca    8.4<L>      20 Aug 2020 06:44    TPro  6.7  /  Alb  3.1<L>  /  TBili  0.7  /  DBili  x   /  AST  14<L>  /  ALT  18  /  AlkPhos  67  08-19            Culture - Tissue with Gram Stain (collected 19 Aug 2020 17:43)  Source: .Tissue Other, right foot  Gram Stain (19 Aug 2020 23:49):    No polymorphonuclear cells seen per low power field    No organisms seen per oil power field    Culture - Blood (collected 18 Aug 2020 11:37)  Source: .Blood Blood  Preliminary Report (19 Aug 2020 12:02):    No growth to date.    Culture - Blood (collected 18 Aug 2020 11:37)  Source: .Blood Blood  Preliminary Report (19 Aug 2020 12:02):    No growth to date.        Imaging: ----------  < from: Xray Foot AP + Lateral + Oblique, Right (08.19.20 @ 13:01) >  EXAM:  FOOT RIGHT (MINIMUM 3 VIEWS)                            PROCEDURE DATE:  08/19/2020          INTERPRETATION:  Radiographs of the right foot    CLINICAL INFORMATION:  Postop    TECHNIQUE:  Frontal, oblique and lateral views of the foot were obtained.    FINDINGS:   The study of 6/10/2020 is available for review.    The forefoot is remarkable for further amputation of the first metatarsal with postoperative changes in the soft tissue at this level. Screw is again seen through the phalangesof the great toe. Previous second and third metatarsal osteotomies are again appreciated.    The hindfoot demonstrates a small plantar calcaneal spur.    There is ulcer on the plantar surface of the forefoot    IMPRESSION:   Postoperative changes with amputation at the proximal first metatarsal level. Plantar ulcer.        < end of copied text >

## 2020-08-20 NOTE — PROGRESS NOTE ADULT - ASSESSMENT
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     Hypertension  - BP: 145/78 (08-20-20 @ 12:56) (141/77 - 175/84)  - On metoprolol  mg once a day at home, Continue BB  -Can add ACE if tighter BP is needed, SBP spikes maybe reactive  - Monitor routine hemodynamics     Atrial Fibrillation   - HR: 95 (08-20-20 @ 12:56) (81 - 95)  - Continue Toprol    - Patient has history of A fib s/p cardioversion, was not on AC as he was in NSR, found to be in A fib on admission ECG; will discuss restarting on AC upon discharge post op as  his CHADSVASC score is 2.  -cw Eliquis .   - TTE per routine (can be done after surgery)     Surgical resection of 1st metatarsal of right foot.   -s/p resection of 1st metatarsal of right foot w/o cardiac complications  - per surgery      - Monitor and replete lytes, keep K>4, Mg>2.  - All other medical needs as per primary team.  - Other cardiovascular workup will depend on clinical course.  - Will continue to follow.    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     Hypertension  - BP: 145/78 (08-20-20 @ 12:56) (141/77 - 175/84)  - On metoprolol  mg once a day at home, Continue BB  - Can add ACE if tighter BP is needed, SBP spikes maybe reactive  - Monitor routine hemodynamics     Atrial Fibrillation   - HR: 95 (08-20-20 @ 12:56) (81 - 95)  - Continue Toprol    - Patient has history of A fib s/p cardioversion, was not on AC as he was in NSR, found to be in A fib on admission ECG; will discuss restarting on AC upon discharge post op as  his CHADSVASC score is 2.  - start a/c, he would prefer xarelto because it is once/day  - TTE per routine    Surgical resection of 1st metatarsal of right foot.   - s/p resection of 1st metatarsal of right foot w/o cardiac complications  - per surgery      - Monitor and replete lytes, keep K>4, Mg>2.  - All other medical needs as per primary team.  - Other cardiovascular workup will depend on clinical course.  - Will continue to follow.    Panda Peter DNP, ANP-c  Cardiology   Spectra #[id number]  [phone]

## 2020-08-20 NOTE — PROGRESS NOTE ADULT - SUBJECTIVE AND OBJECTIVE BOX
The patient was interviewed and evaluated  69y Male s/p right foot metatarsal head resection    T(C): 36.8 (08-20-20 @ 05:22), Max: 37.8 (08-19-20 @ 21:19)  HR: 81 (08-20-20 @ 05:22) (78 - 90)  BP: 141/77 (08-20-20 @ 05:22) (111/76 - 175/84)  RR: 18 (08-20-20 @ 05:22) (13 - 18)  SpO2: 95% (08-20-20 @ 05:22) (95% - 100%)  Wt(kg): --    Pt seen, doing well, no anesthesia complications or complaints noted or reported.   No Nausea    No additional recommendations.     Pain well controlled

## 2020-08-20 NOTE — PROGRESS NOTE ADULT - SUBJECTIVE AND OBJECTIVE BOX
[facility] -- Karla Reed, Fredis Graham, Thomas Sanches [place]  Office # [phone]      Follow Up:    Cardiac optimization pre/post op, A fib  Subjective/Observations:   No events overnight resting comfortably in bed.  No complaints of chest pain, dyspnea, or palpitations reported. No signs of orthopnea or PND.     REVIEW OF SYSTEMS: All other review of systems is negative unless indicated above    PAST MEDICAL & SURGICAL HISTORY:  Afib: s/p cardioversion  Melanocytic nevi of left ear: s/p excision  Arthritis: hx of multiple knee surgery  Hypertension  History of skin cancer in adulthood: left ear lobe wide excision 7/2018  S/P foot surgery: right 2007  Ulnar nerve impingement: left 1996  S/P knee surgery: arthroscopy bilat knees - total 3 times - 2000, 2002, 2010  S/P carpal tunnel release: left 1996  S/P laminectomy: 1995      MEDICATIONS  (STANDING):  ceFAZolin   IVPB 2000 milliGRAM(s) IV Intermittent every 8 hours  ergocalciferol 20300 Unit(s) Oral every week  metoprolol succinate  milliGRAM(s) Oral daily  rivaroxaban 20 milliGRAM(s) Oral with dinner    MEDICATIONS  (PRN):  acetaminophen   Tablet .. 650 milliGRAM(s) Oral every 6 hours PRN Temp greater or equal to 38C (100.4F), Mild Pain (1 - 3), Moderate Pain (4 - 6)  melatonin 5 milliGRAM(s) Oral at bedtime PRN Insomnia      Allergies    No Known Allergies    Intolerances        Vital Signs Last 24 Hrs  T(C): 36.7 (20 Aug 2020 12:56), Max: 37.8 (19 Aug 2020 21:19)  T(F): 98.1 (20 Aug 2020 12:56), Max: 100 (19 Aug 2020 21:19)  HR: 95 (20 Aug 2020 12:56) (81 - 95)  BP: 145/78 (20 Aug 2020 12:56) (141/77 - 175/84)  BP(mean): --  RR: 18 (20 Aug 2020 12:56) (18 - 18)  SpO2: 96% (20 Aug 2020 12:56) (95% - 96%)    I&O's Summary    19 Aug 2020 07:01  -  20 Aug 2020 07:00  --------------------------------------------------------  IN: 0 mL / OUT: 300 mL / NET: -300 mL          PHYSICAL EXAM:  TELE: Off tele   Constitutional: NAD, awake and alert, well-developed  HEENT: Moist Mucous Membranes, Anicteric  Pulmonary: Non-labored, breath sounds are clear bilaterally, No wheezing, crackles or rhonchi  Cardiovascular: Irregular, S1 and S2 nl, No murmurs, rubs, gallops or clicks  Gastrointestinal: Bowel Sounds present, soft, nontender.   Lymph: No lymphadenopathy. No peripheral edema.  Skin: No visible rashes or ulcers.  Psych:  Mood & affect appropriate    LABS: All Labs Reviewed:                        13.3   6.34  )-----------( 208      ( 20 Aug 2020 06:44 )             39.7                         14.3   4.45  )-----------( 210      ( 19 Aug 2020 07:17 )             42.3                         15.1   6.15  )-----------( 210      ( 18 Aug 2020 09:06 )             45.2     20 Aug 2020 06:44    140    |  108    |  11     ----------------------------<  99     3.7     |  26     |  0.79   19 Aug 2020 07:17    141    |  110    |  12     ----------------------------<  80     3.8     |  24     |  0.83   18 Aug 2020 09:06    140    |  110    |  16     ----------------------------<  98     4.2     |  26     |  1.00     Ca    8.4        20 Aug 2020 06:44  Ca    8.5        19 Aug 2020 07:17  Ca    8.8        18 Aug 2020 09:06    TPro  6.7    /  Alb  3.1    /  TBili  0.7    /  DBili  x      /  AST  14     /  ALT  18     /  AlkPhos  67     19 Aug 2020 07:17  TPro  7.3    /  Alb  3.5    /  TBili  0.5    /  DBili  x      /  AST  19     /  ALT  20     /  AlkPhos  75     18 Aug 2020 09:06             ECG:  < from: 12 Lead ECG (08.18.20 @ 09:47) >  Ventricular Rate 75 BPM    Atrial Rate 340 BPM    QRS Duration 102 ms    Q-T Interval 376 ms    QTC Calculation(Bezet) 419 ms    R Axis 31 degrees    T Axis 25 degrees    Diagnosis Line Atrial fibrillation  Confirmed by JULIANO SANCHES ([id number]) on 8/18/2020 2:46:10 PM    < end of copied text >    Echo:  pending interpretation   Radiology:  < from: Xray Chest 1 View- PORTABLE-Routine (08.18.20 @ 09:52) >  EXAM:  XR CHEST PORTABLE ROUTINE 1V                            PROCEDURE DATE:  08/18/2020          INTERPRETATION:  Chest portable.    Clinical History: Preoperative foot surgery.    Comparison: 4/16/2019.    Single AP view submitted.    The evaluation of the cardiomediastinal silhouette is limited on portable technique.    No focal consolidation, effusion or pneumothorax is noted.    Chronic fracture deformity right clavicle.    Impression:    No acute pulmonary process demonstrated.      EDUARDO VERGARA M.D., ATTENDING RADIOLOGIST  This document has been electronically signed. Aug 18 2020  2:55PM              < end of copied text >

## 2020-08-20 NOTE — CONSULT NOTE ADULT - SUBJECTIVE AND OBJECTIVE BOX
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 Denies trauma.  Denies hx of diabetes mellitus.     Infectious Disease consult was called to evaluate pt and for antibiotic management    Past Medical & Surgical Hx:  PAST MEDICAL & SURGICAL HISTORY:  Afib: s/p cardioversion  Melanocytic nevi of left ear: s/p excision  Arthritis: hx of multiple knee surgery  Hypertension  History of skin cancer in adulthood: left ear lobe wide excision 7/2018  S/P foot surgery: right 2007  Ulnar nerve impingement: left 1996  S/P knee surgery: arthroscopy bilat knees - total 3 times - 2000, 2002, 2010  S/P carpal tunnel release: left 1996  S/P laminectomy: 1995      Social History--  EtOH: denies   Tobacco: denies   Drug Use: denies       FAMILY HISTORY:  No pertinent family history in first degree relatives      Allergies  No Known Allergies    Intolerances  NONE    Home Medications:  Vitamin D3 50,000 intl units (1250 mcg) oral capsule: 1 cap(s) orally once a week on Wednesday (18 Aug 2020 11:18)    Current Inpatient Medications :    ANTIBIOTICS:   ceFAZolin   IVPB 2000 milliGRAM(s) IV Intermittent every 8 hours      OTHER RELEVANT MEDICATIONS :  acetaminophen   Tablet .. 650 milliGRAM(s) Oral every 6 hours PRN  ergocalciferol 56894 Unit(s) Oral every week  melatonin 5 milliGRAM(s) Oral at bedtime PRN  metoprolol succinate  milliGRAM(s) Oral daily  rivaroxaban 20 milliGRAM(s) Oral with dinner    ROS:  CONSTITUTIONAL:  Negative fever or chills  EYES:  Negative  blurry vision or double vision  CARDIOVASCULAR:  Negative for chest pain or palpitations  RESPIRATORY:  Negative for cough, wheezing, or SOB   GASTROINTESTINAL:  Negative for nausea, vomiting, diarrhea, constipation, or abdominal pain  GENITOURINARY:  Negative frequency, urgency , dysuria or hematuria   NEUROLOGIC:  No headache, confusion, dizziness, lightheadedness  All other systems were reviewed and are negative      I&O's Detail    19 Aug 2020 07:01  -  20 Aug 2020 07:00  --------------------------------------------------------  IN:  Total IN: 0 mL    OUT:    Voided: 300 mL  Total OUT: 300 mL    Total NET: -300 mL    Physical Exam:  Vital Signs Last 24 Hrs  T(C): 36.7 (20 Aug 2020 12:56), Max: 37.8 (19 Aug 2020 21:19)  T(F): 98.1 (20 Aug 2020 12:56), Max: 100 (19 Aug 2020 21:19)  HR: 95 (20 Aug 2020 12:56) (81 - 95)  BP: 145/78 (20 Aug 2020 12:56) (141/77 - 175/84)  RR: 18 (20 Aug 2020 12:56) (18 - 18)  SpO2: 96% (20 Aug 2020 12:56) (95% - 96%)      General:  no acute distress  Eyes: sclera anicteric, pupils equal and reactive to light  ENMT: buccal mucosa moist, pharynx not injected  Neck: supple, trachea midline  Lungs: clear, no wheeze/rhonchi  Cardiovascular: regular rate and rhythm, S1 S2  Abdomen: soft, nontender, no organomegaly present, bowel sounds normal  Neurological:  alert and oriented x3, Cranial Nerves II-XII grossly intact  Skin:no increased ecchymosis/petechiae/purpura  Lymph Nodes: no palpable cervical/supraclavicular lymph nodes enlargements  Extremities: right foot drsg c/d/i    Labs:                         13.3   6.34  )-----------( 208      ( 20 Aug 2020 06:44 )             39.7   08-20    140  |  108  |  11  ----------------------------<  99  3.7   |  26  |  0.79    Ca    8.4<L>      20 Aug 2020 06:44    TPro  6.7  /  Alb  3.1<L>  /  TBili  0.7  /  DBili  x   /  AST  14<L>  /  ALT  18  /  AlkPhos  67  08-19    Sedimentation Rate, Erythrocyte (04.16.19 @ 10:01)    Sedimentation Rate, Erythrocyte: 2 mm/hr    RECENT CULTURES:    Culture - Tissue with Gram Stain (collected 19 Aug 2020 17:43)  Source: .Tissue Other, right foot  Gram Stain (19 Aug 2020 23:49):    No polymorphonuclear cells seen per low power field    No organisms seen per oil power field    Culture - Blood (collected 18 Aug 2020 11:37)  Source: .Blood Blood  Preliminary Report (19 Aug 2020 12:02):    No growth to date.    Culture - Blood (collected 18 Aug 2020 11:37)  Source: .Blood Blood  Preliminary Report (19 Aug 2020 12:02):    No growth to date.    RADIOLOGY & ADDITIONAL STUDIES:  EXAM:  FOOT RIGHT (MINIMUM 3 VIEWS)                            PROCEDURE DATE:  08/19/2020          INTERPRETATION:  Radiographs of the right foot    CLINICAL INFORMATION:  Postop    TECHNIQUE:  Frontal, oblique and lateral views of the foot were obtained.    FINDINGS:   The study of 6/10/2020 is available for review.    The forefoot is remarkable for further amputation of the first metatarsal with postoperative changes in the soft tissue at this level. Screw is again seen through the phalangesof the great toe. Previous second and third metatarsal osteotomies are again appreciated.    The hindfoot demonstrates a small plantar calcaneal spur.    There is ulcer on the plantar surface of the forefoot    IMPRESSION:   Postoperative changes with amputation at the proximal first metatarsal level. Plantar ulcer.    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  Approptiate use of antibiotics and adverse effects reviewed.      I have discussed the above plan of care with patient/family in detail. They expressed understanding of the treatment plan . Risks, benefits and alternatives discussed in detail. I have asked if they have any questions or concerns and appropriately addressed them to the best of my ability .      > 45 minutes spent in direct patient care reviewing  the notes, lab data/ imaging , discussion with multidisciplinary team. All questions were addressed and answered to the best of my capacity .    Thank you for allowing me to participate in the care of your patient .      Allyson Frye MD  Infectious Disease  [phone]

## 2020-08-20 NOTE — PROGRESS NOTE ADULT - SUBJECTIVE AND OBJECTIVE BOX
Patient is a 69y old  Male who presents with a chief complaint of Nonhealing R. foot ulcer (18 Aug 2020 16:45)      INTERVAL HPI/OVERNIGHT EVENTS: Patient seen and examined. Doing well this AM. Had amputation yesterday. Post op course uncomplicated. No CP/SOB/palpitations. No N/V/D.     MEDICATIONS  (STANDING):  ceFAZolin   IVPB 2000 milliGRAM(s) IV Intermittent every 8 hours  ergocalciferol 90364 Unit(s) Oral every week  metoprolol succinate  milliGRAM(s) Oral daily    MEDICATIONS  (PRN):  acetaminophen   Tablet .. 650 milliGRAM(s) Oral every 6 hours PRN Temp greater or equal to 38C (100.4F), Mild Pain (1 - 3), Moderate Pain (4 - 6)  melatonin 5 milliGRAM(s) Oral at bedtime PRN Insomnia        Allergies    No Known Allergies      REVIEW OF SYSTEMS:  CONSTITUTIONAL: No fever or chills  HEENT:  No headache, no sore throat  RESPIRATORY: No cough, wheezing, or shortness of breath  CARDIOVASCULAR: No chest pain, palpitations  GASTROINTESTINAL: No abd pain, nausea, vomiting, or diarrhea  GENITOURINARY: No dysuria, frequency, or hematuria  NEUROLOGICAL: no focal weakness or dizziness  MUSCULOSKELETAL: no myalgias     Vital Signs Last 24 Hrs  T(C): 36.8 (20 Aug 2020 05:22), Max: 37.8 (19 Aug 2020 21:19)  T(F): 98.2 (20 Aug 2020 05:22), Max: 100 (19 Aug 2020 21:19)  HR: 81 (20 Aug 2020 05:22) (81 - 90)  BP: 141/77 (20 Aug 2020 05:22) (111/76 - 175/84)  BP(mean): --  RR: 18 (20 Aug 2020 05:22) (13 - 18)  SpO2: 95% (20 Aug 2020 05:22) (95% - 100%)    PHYSICAL EXAM:  GENERAL: NAD  HEENT:  anicteric, moist mucous membranes  CHEST/LUNG:  CTA b/l, no rales, wheezes, or rhonchi  HEART:  RRR, S1, S2  ABDOMEN:  BS+, soft, nontender, nondistended  EXTREMITIES: +right foot in bulky dressing, +varicose veins, no edema, cyanosis, or calf tenderness  NERVOUS SYSTEM: answers questions and follows commands appropriately    LABS:                          13.3   6.34  )-----------( 208      ( 20 Aug 2020 06:44 )             39.7   08-20    140  |  108  |  11  ----------------------------<  99  3.7   |  26  |  0.79    Ca    8.4<L>      20 Aug 2020 06:44    TPro  6.7  /  Alb  3.1<L>  /  TBili  0.7  /  DBili  x   /  AST  14<L>  /  ALT  18  /  AlkPhos  67  08-19    RADIOLOGY & ADDITIONAL TESTS:    Personally reviewed.     Consultant(s) Notes Reviewed:  [x] YES  [ ] NO

## 2020-08-20 NOTE — CONSULT NOTE ADULT - CONSULT REASON
Pre op cardiac clearance for surgical resection of 1st metatarsal bone of right foot
Right foot non healing wound with
Right great toe nonhealing ulcer rule out osteomyelitis

## 2020-08-21 ENCOUNTER — TRANSCRIPTION ENCOUNTER (OUTPATIENT)
Age: 70
End: 2020-08-21

## 2020-08-21 VITALS
DIASTOLIC BLOOD PRESSURE: 92 MMHG | OXYGEN SATURATION: 98 % | SYSTOLIC BLOOD PRESSURE: 173 MMHG | TEMPERATURE: 98 F | RESPIRATION RATE: 20 BRPM | HEART RATE: 85 BPM

## 2020-08-21 LAB
-  AMIKACIN: SIGNIFICANT CHANGE UP
-  AMOXICILLIN/CLAVULANIC ACID: SIGNIFICANT CHANGE UP
-  AMPICILLIN/SULBACTAM: SIGNIFICANT CHANGE UP
-  AMPICILLIN: SIGNIFICANT CHANGE UP
-  AZTREONAM: SIGNIFICANT CHANGE UP
-  CEFAZOLIN: SIGNIFICANT CHANGE UP
-  CEFEPIME: SIGNIFICANT CHANGE UP
-  CEFOXITIN: SIGNIFICANT CHANGE UP
-  CEFTRIAXONE: SIGNIFICANT CHANGE UP
-  CIPROFLOXACIN: SIGNIFICANT CHANGE UP
-  ERTAPENEM: SIGNIFICANT CHANGE UP
-  GENTAMICIN: SIGNIFICANT CHANGE UP
-  IMIPENEM: SIGNIFICANT CHANGE UP
-  LEVOFLOXACIN: SIGNIFICANT CHANGE UP
-  MEROPENEM: SIGNIFICANT CHANGE UP
-  PIPERACILLIN/TAZOBACTAM: SIGNIFICANT CHANGE UP
-  TOBRAMYCIN: SIGNIFICANT CHANGE UP
-  TRIMETHOPRIM/SULFAMETHOXAZOLE: SIGNIFICANT CHANGE UP
ANION GAP SERPL CALC-SCNC: 4 MMOL/L — LOW (ref 5–17)
BUN SERPL-MCNC: 13 MG/DL — SIGNIFICANT CHANGE UP (ref 7–23)
CALCIUM SERPL-MCNC: 8.5 MG/DL — SIGNIFICANT CHANGE UP (ref 8.5–10.1)
CHLORIDE SERPL-SCNC: 107 MMOL/L — SIGNIFICANT CHANGE UP (ref 96–108)
CO2 SERPL-SCNC: 29 MMOL/L — SIGNIFICANT CHANGE UP (ref 22–31)
CREAT SERPL-MCNC: 0.99 MG/DL — SIGNIFICANT CHANGE UP (ref 0.5–1.3)
CULTURE RESULTS: SIGNIFICANT CHANGE UP
GLUCOSE SERPL-MCNC: 119 MG/DL — HIGH (ref 70–99)
HCT VFR BLD CALC: 41.5 % — SIGNIFICANT CHANGE UP (ref 39–50)
HGB BLD-MCNC: 14 G/DL — SIGNIFICANT CHANGE UP (ref 13–17)
MCHC RBC-ENTMCNC: 31 PG — SIGNIFICANT CHANGE UP (ref 27–34)
MCHC RBC-ENTMCNC: 33.7 GM/DL — SIGNIFICANT CHANGE UP (ref 32–36)
MCV RBC AUTO: 91.8 FL — SIGNIFICANT CHANGE UP (ref 80–100)
METHOD TYPE: SIGNIFICANT CHANGE UP
NRBC # BLD: 0 /100 WBCS — SIGNIFICANT CHANGE UP (ref 0–0)
ORGANISM # SPEC MICROSCOPIC CNT: SIGNIFICANT CHANGE UP
ORGANISM # SPEC MICROSCOPIC CNT: SIGNIFICANT CHANGE UP
PLATELET # BLD AUTO: 213 K/UL — SIGNIFICANT CHANGE UP (ref 150–400)
POTASSIUM SERPL-MCNC: 3.6 MMOL/L — SIGNIFICANT CHANGE UP (ref 3.5–5.3)
POTASSIUM SERPL-SCNC: 3.6 MMOL/L — SIGNIFICANT CHANGE UP (ref 3.5–5.3)
RBC # BLD: 4.52 M/UL — SIGNIFICANT CHANGE UP (ref 4.2–5.8)
RBC # FLD: 13.2 % — SIGNIFICANT CHANGE UP (ref 10.3–14.5)
SODIUM SERPL-SCNC: 140 MMOL/L — SIGNIFICANT CHANGE UP (ref 135–145)
SPECIMEN SOURCE: SIGNIFICANT CHANGE UP
WBC # BLD: 5.56 K/UL — SIGNIFICANT CHANGE UP (ref 3.8–10.5)
WBC # FLD AUTO: 5.56 K/UL — SIGNIFICANT CHANGE UP (ref 3.8–10.5)

## 2020-08-21 PROCEDURE — 73630 X-RAY EXAM OF FOOT: CPT

## 2020-08-21 PROCEDURE — 88311 DECALCIFY TISSUE: CPT

## 2020-08-21 PROCEDURE — 99024 POSTOP FOLLOW-UP VISIT: CPT

## 2020-08-21 PROCEDURE — 86769 SARS-COV-2 COVID-19 ANTIBODY: CPT

## 2020-08-21 PROCEDURE — 83605 ASSAY OF LACTIC ACID: CPT

## 2020-08-21 PROCEDURE — 85027 COMPLETE CBC AUTOMATED: CPT

## 2020-08-21 PROCEDURE — 83036 HEMOGLOBIN GLYCOSYLATED A1C: CPT

## 2020-08-21 PROCEDURE — 80053 COMPREHEN METABOLIC PANEL: CPT

## 2020-08-21 PROCEDURE — 36415 COLL VENOUS BLD VENIPUNCTURE: CPT

## 2020-08-21 PROCEDURE — 99239 HOSP IP/OBS DSCHRG MGMT >30: CPT

## 2020-08-21 PROCEDURE — 86900 BLOOD TYPING SEROLOGIC ABO: CPT

## 2020-08-21 PROCEDURE — 85610 PROTHROMBIN TIME: CPT

## 2020-08-21 PROCEDURE — 93005 ELECTROCARDIOGRAM TRACING: CPT

## 2020-08-21 PROCEDURE — 86850 RBC ANTIBODY SCREEN: CPT

## 2020-08-21 PROCEDURE — 86901 BLOOD TYPING SEROLOGIC RH(D): CPT

## 2020-08-21 PROCEDURE — 88304 TISSUE EXAM BY PATHOLOGIST: CPT

## 2020-08-21 PROCEDURE — 85730 THROMBOPLASTIN TIME PARTIAL: CPT

## 2020-08-21 PROCEDURE — 80048 BASIC METABOLIC PNL TOTAL CA: CPT

## 2020-08-21 PROCEDURE — 87186 SC STD MICRODIL/AGAR DIL: CPT

## 2020-08-21 PROCEDURE — 87070 CULTURE OTHR SPECIMN AEROBIC: CPT

## 2020-08-21 PROCEDURE — 99285 EMERGENCY DEPT VISIT HI MDM: CPT | Mod: 25

## 2020-08-21 PROCEDURE — 71045 X-RAY EXAM CHEST 1 VIEW: CPT

## 2020-08-21 PROCEDURE — 93306 TTE W/DOPPLER COMPLETE: CPT

## 2020-08-21 PROCEDURE — 87040 BLOOD CULTURE FOR BACTERIA: CPT

## 2020-08-21 PROCEDURE — 87075 CULTR BACTERIA EXCEPT BLOOD: CPT

## 2020-08-21 PROCEDURE — 99232 SBSQ HOSP IP/OBS MODERATE 35: CPT

## 2020-08-21 PROCEDURE — 87635 SARS-COV-2 COVID-19 AMP PRB: CPT

## 2020-08-21 RX ORDER — RIVAROXABAN 15 MG-20MG
1 KIT ORAL
Qty: 30 | Refills: 0
Start: 2020-08-21 | End: 2020-09-19

## 2020-08-21 RX ORDER — AMLODIPINE BESYLATE 2.5 MG/1
1 TABLET ORAL
Qty: 30 | Refills: 0
Start: 2020-08-21 | End: 2020-09-19

## 2020-08-21 RX ORDER — CEFPODOXIME PROXETIL 100 MG
1 TABLET ORAL
Qty: 28 | Refills: 0
Start: 2020-08-21 | End: 2020-09-03

## 2020-08-21 RX ORDER — AMLODIPINE BESYLATE 2.5 MG/1
5 TABLET ORAL DAILY
Refills: 0 | Status: DISCONTINUED | OUTPATIENT
Start: 2020-08-21 | End: 2020-08-21

## 2020-08-21 RX ADMIN — Medication 100 MILLIGRAM(S): at 14:27

## 2020-08-21 RX ADMIN — Medication 5 MILLIGRAM(S): at 00:25

## 2020-08-21 RX ADMIN — Medication 100 MILLIGRAM(S): at 05:23

## 2020-08-21 RX ADMIN — RIVAROXABAN 20 MILLIGRAM(S): KIT at 16:39

## 2020-08-21 RX ADMIN — AMLODIPINE BESYLATE 5 MILLIGRAM(S): 2.5 TABLET ORAL at 10:07

## 2020-08-21 NOTE — PROGRESS NOTE ADULT - SUBJECTIVE AND OBJECTIVE BOX
KHOI RAMOS is a 69yMale , patient examined and chart reviewed.     INTERVAL HPI/ OVERNIGHT EVENTS:   Afebrile. Feels well. Wants to go home.    PAST MEDICAL & SURGICAL HISTORY:  Afib: s/p cardioversion  Melanocytic nevi of left ear: s/p excision  Arthritis: hx of multiple knee surgery  Hypertension  History of skin cancer in adulthood: left ear lobe wide excision 7/2018  S/P foot surgery: right 2007  Ulnar nerve impingement: left 1996  S/P knee surgery: arthroscopy bilat knees - total 3 times - 2000, 2002, 2010  S/P carpal tunnel release: left 1996  S/P laminectomy: 1995      For details regarding the patient's social history, family history, and other miscellaneous elements, please refer the initial infectious diseases consultation and/or the admitting history and physical examination for this admission.    ROS:  CONSTITUTIONAL:  Negative fever or chills  EYES:  Negative  blurry vision or double vision  CARDIOVASCULAR:  Negative for chest pain or palpitations  RESPIRATORY:  Negative for cough, wheezing, or SOB   GASTROINTESTINAL:  Negative for nausea, vomiting, diarrhea, constipation, or abdominal pain  GENITOURINARY:  Negative frequency, urgency or dysuria  NEUROLOGIC:  No headache, confusion, dizziness, lightheadedness  All other systems were reviewed and are negative     Current inpatient medications :    ANTIBIOTICS/RELEVANT:  ceFAZolin   IVPB 2000 milliGRAM(s) IV Intermittent every 8 hours      acetaminophen   Tablet .. 650 milliGRAM(s) Oral every 6 hours PRN  amLODIPine   Tablet 5 milliGRAM(s) Oral daily  ergocalciferol 93794 Unit(s) Oral every week  melatonin 5 milliGRAM(s) Oral at bedtime PRN  metoprolol succinate  milliGRAM(s) Oral daily  rivaroxaban 20 milliGRAM(s) Oral with dinner      Objective:    T(C): 36.8 (08-21-20 @ 12:34), Max: 36.8 (08-20-20 @ 21:22)  HR: 85 (08-21-20 @ 12:34) (84 - 87)  BP: 173/92 (08-21-20 @ 12:34) (153/81 - 173/92)  RR: 20 (08-21-20 @ 12:34) (18 - 20)  SpO2: 98% (08-21-20 @ 12:34) (96% - 98%)      Physical Exam:  General: no acute distress  Eyes: sclera anicteric, pupils equal and reactive to light  ENMT: buccal mucosa moist, pharynx not injected  Neck: supple, trachea midline  Lungs: clear, no wheeze/rhonchi  Cardiovascular: regular rate and rhythm, S1 S2  Abdomen: soft, nontender, no organomegaly present, bowel sounds normal  Neurological: alert and oriented x3, Cranial Nerves II-XII grossly intact  Skin: no increased ecchymosis/petechiae/purpura  Lymph Nodes: no palpable cervical/supraclavicular lymph nodes enlargements  Extremities: right foot drsg c/d/i    LABS:                          14.0   5.56  )-----------( 213      ( 21 Aug 2020 09:17 )             41.5       08-21    140  |  107  |  13  ----------------------------<  119<H>  3.6   |  29  |  0.99    Ca    8.5      21 Aug 2020 09:17    MICROBIOLOGY:  Culture - Tissue with Gram Stain (collected 19 Aug 2020 17:43)  Source: .Tissue Other, right foot  Gram Stain (19 Aug 2020 23:49):    No polymorphonuclear cells seen per low power field    No organisms seen per oil power field  Preliminary Report (20 Aug 2020 21:33):    Rare Enterobacter cloacae complex      Assessment :  68yo male with PMHx of 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  Appropriate use of antibiotics and adverse effects reviewed.      I have discussed the above plan of care with patient/ family in detail. They expressed understanding of the  treatment plan . Risks, benefits and alternatives discussed in detail. I have asked if they have any questions or concerns and appropriately addressed them to the best of my ability .    > 35 minutes were spent in direct patient care reviewing notes, medications ,labs data/ imaging , discussion with multidisciplinary team.    Thank you for allowing me to participate in care of your patient .    Allyson Frye MD  Infectious Disease  [phone]

## 2020-08-21 NOTE — PROGRESS NOTE ADULT - ASSESSMENT
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

## 2020-08-21 NOTE — PROGRESS NOTE ADULT - SUBJECTIVE AND OBJECTIVE BOX
[facility] -- Karla Reed, Fredis Graham, Thomas Sanches [place], [place]  Office # [phone]    Follow Up:  Preop/Postop Optimization, Afib    Subjective/Observations: Sitting on the chair, denies postop pain.  Denies any respiratory or cardiac discomfort    REVIEW OF SYSTEMS: All other review of systems is negative unless indicated above  PAST MEDICAL & SURGICAL HISTORY:  Afib: s/p cardioversion  Melanocytic nevi of left ear: s/p excision  Arthritis: hx of multiple knee surgery  Hypertension  History of skin cancer in adulthood: left ear lobe wide excision 7/2018  S/P foot surgery: right 2007  Ulnar nerve impingement: left 1996  S/P knee surgery: arthroscopy bilat knees - total 3 times - 2000, 2002, 2010  S/P carpal tunnel release: left 1996  S/P laminectomy: 1995    MEDICATIONS  (STANDING):  amLODIPine   Tablet 5 milliGRAM(s) Oral daily  ceFAZolin   IVPB 2000 milliGRAM(s) IV Intermittent every 8 hours  ergocalciferol 48420 Unit(s) Oral every week  metoprolol succinate  milliGRAM(s) Oral daily  rivaroxaban 20 milliGRAM(s) Oral with dinner    MEDICATIONS  (PRN):  acetaminophen   Tablet .. 650 milliGRAM(s) Oral every 6 hours PRN Temp greater or equal to 38C (100.4F), Mild Pain (1 - 3), Moderate Pain (4 - 6)  melatonin 5 milliGRAM(s) Oral at bedtime PRN Insomnia    Allergies    No Known Allergies    Intolerances    Vital Signs Last 24 Hrs  T(C): 36.8 (21 Aug 2020 05:16), Max: 36.8 (20 Aug 2020 21:22)  T(F): 98.3 (21 Aug 2020 05:16), Max: 98.3 (20 Aug 2020 21:22)  HR: 84 (21 Aug 2020 05:16) (84 - 95)  BP: 156/84 (21 Aug 2020 05:16) (145/78 - 164/92)  BP(mean): --  RR: 18 (21 Aug 2020 05:16) (18 - 18)  SpO2: 97% (21 Aug 2020 05:16) (96% - 97%)  I&O's Summary      PHYSICAL EXAM:  TELE: Not on tele  Constitutional: NAD, awake and alert, obese  HEENT: Moist Mucous Membranes, Anicteric  Pulmonary: Non-labored, breath sounds are clear bilaterally, No wheezing, rales or rhonchi  Cardiovascular: Regular, S1 and S2, No murmurs, rubs, gallops or clicks  Gastrointestinal: Bowel Sounds present, soft, nontender.   Lymph: No peripheral edema. No lymphadenopathy.  MSK:  Right foor dressing dry and intact  Skin: No visible rashes or ulcers.  Psych:  Mood & affect appropriate  LABS: All Labs Reviewed:                        13.3   6.34  )-----------( 208      ( 20 Aug 2020 06:44 )             39.7                         14.3   4.45  )-----------( 210      ( 19 Aug 2020 07:17 )             42.3                         15.1   6.15  )-----------( 210      ( 18 Aug 2020 09:06 )             45.2     20 Aug 2020 06:44    140    |  108    |  11     ----------------------------<  99     3.7     |  26     |  0.79   19 Aug 2020 07:17    141    |  110    |  12     ----------------------------<  80     3.8     |  24     |  0.83   18 Aug 2020 09:06    140    |  110    |  16     ----------------------------<  98     4.2     |  26     |  1.00     Ca    8.4        20 Aug 2020 06:44  Ca    8.5        19 Aug 2020 07:17  Ca    8.8        18 Aug 2020 09:06    TPro  6.7    /  Alb  3.1    /  TBili  0.7    /  DBili  x      /  AST  14     /  ALT  18     /  AlkPhos  67     19 Aug 2020 07:17  TPro  7.3    /  Alb  3.5    /  TBili  0.5    /  DBili  x      /  AST  19     /  ALT  20     /  AlkPhos  75     18 Aug 2020 09:06    < from: Transesophageal Echocardiogram w/o TTE (03.18.19 @ 09:34) >    Patient name: KHOI RAMOS  YOB: 1950   Age: 68 (M)   MR#: [id number]  Study Date: 3/18/2019  Location: [address] [place], [place] [id number]: Francheska Daniel M.D.  Study quality: Technically good  Referring Physician: Magy Gee MD  Blood Pressure: 174/90 mmHg  Height: 191 cm  Weight: 107 kg  BSA: 2.4 m2  ------------------------------------------------------------------------  PROCEDURE: Transesophageal and transthoracic  echocardiograms with 2-D, M-Mode and complete spectral and  color flow Doppler were performed.  Informed consent was  first obtained for YANICK. The patient was sedated - see  anesthesia record.  The procedure was monitored with  automatic blood pressure monitoring, ECG tracings and pulse  oximetry.  The transesophageal probe was placed in the  esophagus posterior to the heart without complications.  INDICATION: Unspecified atrial fibrillation (I48.91)  ------------------------------------------------------------------------  Observations:  Mitral Valve: Normal mitral valve. Mild mitral  regurgitation.  Aortic Valve/Aorta: Normal trileaflet aortic valve. No  aortic valve regurgitation seen.  Normal aortic root, aortic arch and descending thoracic  aorta.  Left Atrium: Enlarged left atrium. No left atrial or left  atrial appendage thrombus. Decreased left atrial appendage  velocities noted, 29cm/sec.  Left Ventricle: Normal left ventricular systolic function.  No segmental wall motion abnormalities.  Right Heart: Normal right atrium. Normal right ventricular  size and function. Normal tricuspid valve. Mild tricuspid  regurgitation. Normal pulmonic valve.  Pericardium/Pleura: Normal pericardium with no pericardial  effusion.  ------------------------------------------------------------------------  Conclusions:  1. Enlarged left atrium. No left atrial or left atrial  appendage thrombus. Decreased left atrial appendage  velocities noted, 29cm/sec.  2. Normal left ventricular systolic function. No segmental  wall motion abnormalities.  3. Normal right ventricular size and function.  ------------------------------------------------------------------------  Confirmed on  3/18/2019 - 16:59:14 by Francheska Daniel M.D.  ------------------------------------------------------------------------    < end of copied text >    < from: Xray Chest 1 View- PORTABLE-Routine (08.18.20 @ 09:52) >    EXAM:  XR CHEST PORTABLE ROUTINE 1V                          PROCEDURE DATE:  08/18/2020      INTERPRETATION:  Chest portable.    Clinical History: Preoperative foot surgery.    Comparison: 4/16/2019.    Single AP view submitted.    The evaluation of the cardiomediastinal silhouette is limited on portable technique.    No focal consolidation, effusion or pneumothorax is noted.    Chronic fracture deformity right clavicle.    Impression:    No acute pulmonary process demonstrated.    EDUARDO VERGARA M.D., ATTENDING RADIOLOGIST  This document has been electronically signed. Aug 18 2020  2:55PM     < end of copied text >    < from: 12 Lead ECG (08.18.20 @ 09:47) >    Ventricular Rate 75 BPM    Atrial Rate 340 BPM    QRS Duration 102 ms    Q-T Interval 376 ms    QTC Calculation(Bezet) 419 ms    R Axis 31 degrees    T Axis 25 degrees    Diagnosis Line Atrial fibrillation  Confirmed by JULIANO SANCHES ([id number]) on 8/18/2020 2:46:10 PM    < end of copied text >

## 2020-08-21 NOTE — PROGRESS NOTE ADULT - PROBLEM SELECTOR PLAN 4
Start SQ Lovenox today. May start full dose anticoagulation pending Cardio's conversation with patient.
On full dose AC with Xarelto.
SCDs, for now. DVT ppx or full dose anticoagulation post op.

## 2020-08-21 NOTE — PROGRESS NOTE ADULT - SUBJECTIVE AND OBJECTIVE BOX
HPI:     Patient is a 69y year old Male seen at \[facility]\"" 2NOR 230 D1 s/p right first metatarsal resection POD# 2 (DOS: 8/19/2020). Patient denies any pain to the right foot. Patient was seated in the recliner at bed side with legs elevated. Denies any fever, chills, nausea, vomiting, chest pain, shortness of breath, or calf pain at this time.    Allergies    No Known Allergies    Intolerances        MEDICATIONS  (STANDING):  ergocalciferol 62815 Unit(s) Oral every week  metoprolol succinate  milliGRAM(s) Oral daily    MEDICATIONS  (PRN):  acetaminophen   Tablet .. 650 milliGRAM(s) Oral every 6 hours PRN Temp greater or equal to 38C (100.4F), Mild Pain (1 - 3), Moderate Pain (4 - 6)  melatonin 5 milliGRAM(s) Oral at bedtime PRN Insomnia      Vital Signs Last 24 Hrs  T(C): 36.8 (21 Aug 2020 12:34), Max: 36.8 (20 Aug 2020 21:22)  T(F): 98.2 (21 Aug 2020 12:34), Max: 98.3 (20 Aug 2020 21:22)  HR: 85 (21 Aug 2020 12:34) (84 - 87)  BP: 173/92 (21 Aug 2020 12:34) (153/81 - 173/92)  BP(mean): --  ABP: --  ABP(mean): --  RR: 20 (21 Aug 2020 12:34) (18 - 20)  SpO2: 98% (21 Aug 2020 12:34) (96% - 98%)    PHYSICAL EXAM:  Vascular: DP & PT palpable bilaterally, Capillary refill 3 seconds, Digital hair present bilaterally. Non pitting edema noted to the right foot.   Neurological: Light touch sensation diminished to the level of the ankle bilaterally   Musculoskeletal: 5/5 strength in all quadrants bilaterally, AJ & STJ ROM intact, limited and pain free. Absence of left 2nd digit   Dermatological: Skin of bilateral lower extremity is warm to touch. Surtures intact to the right foot surgical site with no dehiscence, no drainage, no erythema noted to the dorsal foot, no proximal streaking      CBC Full  -  ( 21 Aug 2020 09:17 )  WBC Count : 5.56 K/uL  RBC Count : 4.52 M/uL  Hemoglobin : 14.0 g/dL  Hematocrit : 41.5 %  Platelet Count - Automated : 213 K/uL  Mean Cell Volume : 91.8 fl  Mean Cell Hemoglobin : 31.0 pg  Mean Cell Hemoglobin Concentration : 33.7 gm/dL  Auto Neutrophil # : x  Auto Lymphocyte # : x  Auto Monocyte # : x  Auto Eosinophil # : x  Auto Basophil # : x  Auto Neutrophil % : x  Auto Lymphocyte % : x  Auto Monocyte % : x  Auto Eosinophil % : x  Auto Basophil % : x    08-21    140  |  107  |  13  ----------------------------<  119<H>  3.6   |  29  |  0.99    Ca    8.5      21 Aug 2020 09:17        Culture - Tissue with Gram Stain (08.19.20 @ 17:43)    Gram Stain:   No polymorphonuclear cells seen per low power field  No organisms seen per oil power field    Specimen Source: .Tissue Other, right foot    Culture Results:   Rare Enterobacter cloacae complex        Imaging: ----------  < from: Xray Foot AP + Lateral + Oblique, Right (08.19.20 @ 13:01) >  EXAM:  FOOT RIGHT (MINIMUM 3 VIEWS)                            PROCEDURE DATE:  08/19/2020          INTERPRETATION:  Radiographs of the right foot    CLINICAL INFORMATION:  Postop    TECHNIQUE:  Frontal, oblique and lateral views of the foot were obtained.    FINDINGS:   The study of 6/10/2020 is available for review.    The forefoot is remarkable for further amputation of the first metatarsal with postoperative changes in the soft tissue at this level. Screw is again seen through the phalangesof the great toe. Previous second and third metatarsal osteotomies are again appreciated.    The hindfoot demonstrates a small plantar calcaneal spur.    There is ulcer on the plantar surface of the forefoot    IMPRESSION:   Postoperative changes with amputation at the proximal first metatarsal level. Plantar ulcer.        < end of copied text >

## 2020-08-21 NOTE — PROGRESS NOTE ADULT - PROBLEM SELECTOR PLAN 3
- uncontrolled on admission, now improved.  - monitor BPs  - Continue home med, metoprolol

## 2020-08-21 NOTE — PROGRESS NOTE ADULT - REASON FOR ADMISSION
Nonhealing R. foot ulcer

## 2020-08-21 NOTE — PROGRESS NOTE ADULT - ASSESSMENT
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     Surgical resection of 1st metatarsal of right foot.   - s/p resection of 1st metatarsal of right foot w/o cardiac complications  - per surgery  - Pain control    Hypertension  - BP remains elevated at 140-160  - Continue BB  - Add Norvasc  - Monitor routine hemodynamics     Atrial Fibrillation   - Rate-controlled  - Continue Toprol    - Patient has history of A fib s/p cardioversion, was not on AC as he was in NSR, found to be in A fib on admission ECG  - He is now on Xarelto  - He will need to follow up with us as outpatient  - TTE can be done as outpatient unless change of cardiac status  - Monitor and replete lytes, keep K>4, Mg>2.    - All other medical needs as per primary team.  - Other cardiovascular workup will depend on clinical course.  - Will continue to follow.    Yarelis Givens DNP, NP-C  Cardiology   Spectra #[phone]

## 2020-08-21 NOTE — PROGRESS NOTE ADULT - ATTENDING COMMENTS
Chart reviewed    Patient seen and examined    Agree with plan as outlined above
The patient was personally seen and examined, in addition to being examined and evaluated by NP.  All elements of the note were edited where appropriate.
Seen/examined. agree with above.

## 2020-08-21 NOTE — DISCHARGE NOTE NURSING/CASE MANAGEMENT/SOCIAL WORK - PATIENT PORTAL LINK FT
You can access the FollowMyHealth Patient Portal offered by [facility] by registering at the following website: [email or web address]. By joining [employer]’s FollowMyHealth portal, you will also be able to view your health information using other applications (apps) compatible with our system.

## 2020-08-21 NOTE — DISCHARGE NOTE PROVIDER - NSDCCPCAREPLAN_GEN_ALL_CORE_FT
PRINCIPAL DISCHARGE DIAGNOSIS  Diagnosis: Plantar ulcer of right foot, unspecified ulcer stage  Assessment and Plan of Treatment: Pathology is pending and it unclear if you have infected bone remaining. You are leaving the hospital against medical advise and are at risk of poor outcomes due to incomplete workup. Please take antibiotics as prescribed, but a change in regimen may be warranted once pathology results are back. You will be contacted, and this could require return to the hospital. IF you develop fever, chills, worsening foot pain or drainage, please return immediately.      SECONDARY DISCHARGE DIAGNOSES  Diagnosis: Atrial fibrillation, unspecified type  Assessment and Plan of Treatment: You were restarted on a blood thinner. Please take Xarelto daily as prescribed. Take metoprolol as prescribed.    Diagnosis: Hypertension, unspecified type  Assessment and Plan of Treatment: Your blood pressure was uncontrolled, so you were started on a new medication. Please  from pharmacy and take as directed. Follow up with your PCP.

## 2020-08-21 NOTE — PROGRESS NOTE ADULT - PROBLEM SELECTOR PLAN 1
No healing, possible OM.  Podiatry planning for resection of first metatarsal head this AM.  Patient is medically optimized for procedure. Also cleared by cardiology.   NPO since midnight.  Wound care per podiatry.
Non-healing, possible OM.  S/P resection of first metatarsal head on 8/19.  Wound care per podiatry.  Anc for now.  Await path.  Dr Kenrick Frye from ID following.
Non-healing, possible OM.  S/P resection of first metatarsal head yesterday.  Wound care per podiatry.  Ancef for now.  Await path and cultures.  Dr Kenrick Frye from ID to see.

## 2020-08-21 NOTE — DISCHARGE NOTE PROVIDER - HOSPITAL COURSE
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 foot ulcer, melanoma s/p surgical excision, hypertension sent to hospital due to concern for non healing ulcer per podiatry. Pt states he has been following at he wound clinic with Dr Mcrae (podiatry) for wound debridements. States he was advised that surgery would be needed as ulcer is not healing well. Denies fevers, chills, sick contacts, new pus/drainage, pain. Denies hx of diabetes mellitus. Patient taken to OR for amputation of first metatarsal head. Culture grew enterobacter cloacae. Path was sent but remains pending and patient leaving AMA as we do not have path back. He is aware of this and will be treated with Vantin for now pending path results. He is aware that this is not the standard of care and could worsen his outcome and accepts this risk as he has family commitments he must tend to. He will be contacted if changes in management become necessary and understands this may require his return to the hospital.        Time spent: 32 minutes.

## 2020-08-21 NOTE — PROGRESS NOTE ADULT - PROBLEM SELECTOR PLAN 2
- S/p cardioversion 1.5yrs ago.  - Was on anticoagulation in the past for sometime after cardioversion and it was discontinued by his Cardiologist  - CHADVASC score; 2 (EKG on admission shows afib)  - Restarted on Xarelto now.
- S/p cardioversion 1.5yrs ago.  - Was on anticoagulation in the past for sometime after cardioversion and it was discontinued by his Cardiologist  - CHADVASC score; 2 (EKG on admission shows afib, need to discuss about anticoag post procedure)  - Cardio consulted (Dr Singh) for optimization
- S/p cardioversion 1.5yrs ago.  - Was on anticoagulation in the past for sometime after cardioversion and it was discontinued by his Cardiologist  - CHADVASC score; 2 (EKG on admission shows afib)  - Cardio to discuss AC with patient today.

## 2020-08-21 NOTE — PROGRESS NOTE ADULT - NSHPATTENDINGPLANDISCUSS_GEN_ALL_CORE
Attending
patient re: antibiotics, awaiting path, hospital course, discharge planning
Attending
patient re: antibiotics, awaiting path, hospital course, discharge planning
patient re: surgery today, BP control, anticipated hospital course, discharge planning

## 2020-08-21 NOTE — PROGRESS NOTE ADULT - SUBJECTIVE AND OBJECTIVE BOX
Patient is a 69y old  Male who presents with a chief complaint of Nonhealing R. foot ulcer (18 Aug 2020 16:45)      INTERVAL HPI/OVERNIGHT EVENTS: Patient seen and examined. Doing well today and anxious to go home. Denies foot pain. No CP/SOB/palp. No fever/chills.     MEDICATIONS  (STANDING):  amLODIPine   Tablet 5 milliGRAM(s) Oral daily  ceFAZolin   IVPB 2000 milliGRAM(s) IV Intermittent every 8 hours  ergocalciferol 81722 Unit(s) Oral every week  metoprolol succinate  milliGRAM(s) Oral daily  rivaroxaban 20 milliGRAM(s) Oral with dinner    MEDICATIONS  (PRN):  acetaminophen   Tablet .. 650 milliGRAM(s) Oral every 6 hours PRN Temp greater or equal to 38C (100.4F), Mild Pain (1 - 3), Moderate Pain (4 - 6)  melatonin 5 milliGRAM(s) Oral at bedtime PRN Insomnia          Allergies    No Known Allergies      REVIEW OF SYSTEMS:  CONSTITUTIONAL: No fever or chills  HEENT:  No headache, no sore throat  RESPIRATORY: No cough, wheezing, or shortness of breath  CARDIOVASCULAR: No chest pain, palpitations  GASTROINTESTINAL: No abd pain, nausea, vomiting, or diarrhea  GENITOURINARY: No dysuria, frequency, or hematuria  NEUROLOGICAL: no focal weakness or dizziness  MUSCULOSKELETAL: no myalgias     Vital Signs Last 24 Hrs  T(C): 36.8 (21 Aug 2020 05:16), Max: 36.8 (20 Aug 2020 21:22)  T(F): 98.3 (21 Aug 2020 05:16), Max: 98.3 (20 Aug 2020 21:22)  HR: 84 (21 Aug 2020 05:16) (84 - 95)  BP: 156/84 (21 Aug 2020 05:16) (145/78 - 164/92)  BP(mean): --  RR: 18 (21 Aug 2020 05:16) (18 - 18)  SpO2: 97% (21 Aug 2020 05:16) (96% - 97%)    PHYSICAL EXAM:  GENERAL: NAD  HEENT:  anicteric, moist mucous membranes  CHEST/LUNG:  CTA b/l, no rales, wheezes, or rhonchi  HEART:  RRR, S1, S2  ABDOMEN:  BS+, soft, nontender, nondistended  EXTREMITIES: +right foot in bulky dressing that is C/D/I, +varicose veins, no edema, cyanosis, or calf tenderness  NERVOUS SYSTEM: answers questions and follows commands appropriately    LABS:                          13.3   6.34  )-----------( 208      ( 20 Aug 2020 06:44 )             39.7   08-20    140  |  108  |  11  ----------------------------<  99  3.7   |  26  |  0.79    Ca    8.4<L>      20 Aug 2020 06:44        RADIOLOGY & ADDITIONAL TESTS:    Personally reviewed.     Consultant(s) Notes Reviewed:  [x] YES  [ ] NO

## 2020-08-21 NOTE — DISCHARGE NOTE PROVIDER - CARE PROVIDER_API CALL
Frank Vieira  INTERNAL MEDICINE  [address] [place] [address]  Phone: [phone]  Fax: [phone]  Follow Up Time:     Zandra Frye  INFECTIOUS DISEASE  [address] [place] [address] [address]  [place] [address]  Phone: [phone]  Fax: [phone]  Follow Up Time:

## 2020-08-21 NOTE — PROGRESS NOTE ADULT - PROVIDER SPECIALTY LIST ADULT
Anesthesia
Cardiology
Hospitalist
Hospitalist
Infectious Disease
Podiatry
Hospitalist
Podiatry

## 2020-08-21 NOTE — PROGRESS NOTE ADULT - PROBLEM SELECTOR PROBLEM 1
Plantar ulcer of right foot, unspecified ulcer stage

## 2020-08-23 LAB
CULTURE RESULTS: SIGNIFICANT CHANGE UP
CULTURE RESULTS: SIGNIFICANT CHANGE UP
SPECIMEN SOURCE: SIGNIFICANT CHANGE UP
SPECIMEN SOURCE: SIGNIFICANT CHANGE UP

## 2020-08-24 PROBLEM — I48.91 UNSPECIFIED ATRIAL FIBRILLATION: Chronic | Status: ACTIVE | Noted: 2019-04-16

## 2020-08-24 PROBLEM — D22.22 MELANOCYTIC NEVI OF LEFT EAR AND EXTERNAL AURICULAR CANAL: Chronic | Status: ACTIVE | Noted: 2019-03-05

## 2020-08-25 ENCOUNTER — APPOINTMENT (OUTPATIENT)
Dept: PODIATRY | Facility: HOSPITAL | Age: 70
End: 2020-08-25

## 2020-08-25 ENCOUNTER — OUTPATIENT (OUTPATIENT)
Dept: OUTPATIENT SERVICES | Facility: HOSPITAL | Age: 70
LOS: 1 days | Discharge: HOME | End: 2020-08-25
Payer: COMMERCIAL

## 2020-08-25 VITALS
HEART RATE: 72 BPM | DIASTOLIC BLOOD PRESSURE: 81 MMHG | HEIGHT: 75 IN | RESPIRATION RATE: 20 BRPM | OXYGEN SATURATION: 95 % | WEIGHT: 235 LBS | BODY MASS INDEX: 29.22 KG/M2 | SYSTOLIC BLOOD PRESSURE: 133 MMHG | TEMPERATURE: 97.1 F

## 2020-08-25 VITALS
HEIGHT: 75 IN | SYSTOLIC BLOOD PRESSURE: 133 MMHG | RESPIRATION RATE: 20 BRPM | OXYGEN SATURATION: 95 % | DIASTOLIC BLOOD PRESSURE: 81 MMHG | TEMPERATURE: 97.1 F | BODY MASS INDEX: 29.22 KG/M2 | HEART RATE: 72 BPM | WEIGHT: 235 LBS

## 2020-08-25 DIAGNOSIS — L89.893 PRESSURE ULCER OF OTHER SITE, STAGE 3: ICD-10-CM

## 2020-08-25 DIAGNOSIS — M86.179 OTHER ACUTE OSTEOMYELITIS, UNSPECIFIED ANKLE AND FOOT: ICD-10-CM

## 2020-08-25 DIAGNOSIS — Z85.828 PERSONAL HISTORY OF OTHER MALIGNANT NEOPLASM OF SKIN: Chronic | ICD-10-CM

## 2020-08-25 PROCEDURE — G0463: CPT

## 2020-08-25 PROCEDURE — 87070 CULTURE OTHR SPECIMN AEROBIC: CPT

## 2020-08-25 PROCEDURE — 87186 SC STD MICRODIL/AGAR DIL: CPT

## 2020-08-26 ENCOUNTER — APPOINTMENT (OUTPATIENT)
Dept: PODIATRY | Facility: HOSPITAL | Age: 70
End: 2020-08-26

## 2020-08-26 ENCOUNTER — OUTPATIENT (OUTPATIENT)
Dept: OUTPATIENT SERVICES | Facility: HOSPITAL | Age: 70
LOS: 1 days | Discharge: HOME | End: 2020-08-26
Payer: COMMERCIAL

## 2020-08-26 VITALS
TEMPERATURE: 97.6 F | SYSTOLIC BLOOD PRESSURE: 145 MMHG | HEART RATE: 82 BPM | OXYGEN SATURATION: 98 % | WEIGHT: 235 LBS | HEIGHT: 75 IN | BODY MASS INDEX: 29.22 KG/M2 | RESPIRATION RATE: 20 BRPM | DIASTOLIC BLOOD PRESSURE: 90 MMHG

## 2020-08-26 DIAGNOSIS — M86.671 OTHER CHRONIC OSTEOMYELITIS, RIGHT ANKLE AND FOOT: ICD-10-CM

## 2020-08-26 DIAGNOSIS — Z85.828 PERSONAL HISTORY OF OTHER MALIGNANT NEOPLASM OF SKIN: Chronic | ICD-10-CM

## 2020-08-26 DIAGNOSIS — G62.9 POLYNEUROPATHY, UNSPECIFIED: ICD-10-CM

## 2020-08-26 DIAGNOSIS — K57.32 DIVERTICULITIS OF LARGE INTESTINE WITHOUT PERFORATION OR ABSCESS WITHOUT BLEEDING: ICD-10-CM

## 2020-08-26 DIAGNOSIS — Z84.0 FAMILY HISTORY OF DISEASES OF THE SKIN AND SUBCUTANEOUS TISSUE: ICD-10-CM

## 2020-08-26 DIAGNOSIS — Z82.49 FAMILY HISTORY OF ISCHEMIC HEART DISEASE AND OTHER DISEASES OF THE CIRCULATORY SYSTEM: ICD-10-CM

## 2020-08-26 DIAGNOSIS — I11.9 HYPERTENSIVE HEART DISEASE WITHOUT HEART FAILURE: ICD-10-CM

## 2020-08-26 DIAGNOSIS — M86.171 OTHER ACUTE OSTEOMYELITIS, RIGHT ANKLE AND FOOT: ICD-10-CM

## 2020-08-26 DIAGNOSIS — Z79.899 OTHER LONG TERM (CURRENT) DRUG THERAPY: ICD-10-CM

## 2020-08-26 DIAGNOSIS — L89.893 PRESSURE ULCER OF OTHER SITE, STAGE 3: ICD-10-CM

## 2020-08-26 DIAGNOSIS — Z87.828 PERSONAL HISTORY OF OTHER (HEALED) PHYSICAL INJURY AND TRAUMA: ICD-10-CM

## 2020-08-26 DIAGNOSIS — I48.91 UNSPECIFIED ATRIAL FIBRILLATION: ICD-10-CM

## 2020-08-26 DIAGNOSIS — Z79.01 LONG TERM (CURRENT) USE OF ANTICOAGULANTS: ICD-10-CM

## 2020-08-26 DIAGNOSIS — Z79.82 LONG TERM (CURRENT) USE OF ASPIRIN: ICD-10-CM

## 2020-08-26 DIAGNOSIS — Z85.820 PERSONAL HISTORY OF MALIGNANT MELANOMA OF SKIN: ICD-10-CM

## 2020-08-26 LAB — SARS-COV-2 RNA SPEC QL NAA+PROBE: SIGNIFICANT CHANGE UP

## 2020-08-26 PROCEDURE — U0003: CPT

## 2020-08-26 PROCEDURE — 99024 POSTOP FOLLOW-UP VISIT: CPT

## 2020-08-26 PROCEDURE — G0463: CPT

## 2020-08-26 NOTE — ASSESSMENT
[FreeTextEntry1] : Will need IV antibiotics, for now continue vantin but will send him to IR for PICC placement and IV ceftriaxone 2gm daily. \par Will follow culture sent yesterday. \par Will do COVID test before sending him for PICC as per hospital policy (within 3 days). \par If fever, chills or any new symptoms will need to go to ED. \par Need SW to set up for Home infusion. Ceftriaxone 2gm diaily IV. will be 6 weeks with weekly CBC, BMP and CRP. \par  [Treatment Education] : treatment education [Treatment Adherence] : treatment adherence [Drug Interactions / Side Effects] : drug interactions/side effects [Rx Dose / Side Effects] : Rx dose/side effects [Disclosure of Diagnosis] : disclosure of diagnosis [Anticipatory Guidance] : anticipatory guidance

## 2020-08-26 NOTE — HISTORY OF PRESENT ILLNESS
[FreeTextEntry1] : s/p 1st metatarsal head resectioon right foot , patient was seen by ID today and had covid test , will proceed with IV antibiotics and PICC line asap. Packing removed and then replaced , serous sanguinous drainage . Tanya wound erythema and swelling . Plantar ulcer probes to bone  . Pathology + for OM

## 2020-08-26 NOTE — HISTORY OF PRESENT ILLNESS
[FreeTextEntry1] : 68yo male with PMHx of neuropathy 2/2 back surgery, R. foot ulcer, melanoma s/p surgical excision, hypertension was admitted to [facility] for a chronic wound infection. He had a chorin cwound in R foot, for which had 1st metatarsal head excision on 8/19. He was on Jeflex and sent home on vantin and before pathology comes back he left AMA. \par Culture grew entrobacter and pathology margin showed OM. \par He is coming to Wound center since his discharge. \par R foot is swollen with discharge. New culture was sent yesterday. As per him still taking vantin but looks like discharge and redness is worse. No fever or chills.

## 2020-08-26 NOTE — ASSESSMENT
[Verbal] : Verbal [Patient] : Patient [Good - alert, interested, motivated] : Good - alert, interested, motivated [Dressing changes] : dressing changes [Verbalizes knowledge/Understanding] : Verbalizes knowledge/understanding [Foot Care] : foot care [Skin Care] : skin care [Signs and symptoms of infection] : sign and symptoms of infection [Off-loading] : off-loading [Labs and Tests] : labs and tests [How and When to Call] : how and when to call [Patient responsibility to plan of care] : patient responsibility to plan of care [Ambulatory] : Ambulatory [Home] : Home [Stable] : stable [Not Applicable - Long Term Care/Home Health Agency] : Long Term Care/Home Health Agency: Not Applicable [] : No [FreeTextEntry2] : Promote optimal skin integrity, offloading, infection control\par  [FreeTextEntry3] : Wound is same in status [FreeTextEntry4] : Pt seen by ID Dr. Esdras Mcguire.\par Covid swab obtained. PICC line to be placed pending covid swab results.\par Dressing changed by DPM. \par F/U to [facility] tomorrow to have dressing changed by physician. \par

## 2020-08-26 NOTE — REVIEW OF SYSTEMS
[Fever] : no fever [Chills] : no chills [Eye Pain] : no eye pain [Loss Of Hearing] : no hearing loss [Shortness Of Breath] : no shortness of breath [Wheezing] : no wheezing [Abdominal Pain] : no abdominal pain [Vomiting] : no vomiting [Arthralgias] : arthralgias [Joint Stiffness] : joint stiffness [Skin Wound] : skin wound [Anxiety] : no anxiety [Easy Bleeding] : no tendency for easy bleeding [Easy Bruising] : no tendency for easy bruising [Negative] : Endocrine [FreeTextEntry5] : HTN [FreeTextEntry9] : Hammer toes and forefoot deformity  [de-identified] : Stage 3 pressure ulcer plantar 1st met right foot , skin, subcutaneous and fat associated with 1st met hypertrophic spurring  [de-identified] : peripheral neuropathy

## 2020-08-26 NOTE — PHYSICAL EXAM
[Abdominal Pad] : Abdominal Pad [4 x 4] : 4 x 4  [Normal Breath Sounds] : Normal breath sounds [1+] : left 1+ [No Rash or Lesion] : No rash or lesion [Purpura] : no purpura  [Petechiae] : no petechiae [Skin Ulcer] : ulcer [Alert] : alert [Oriented to Person] : oriented to person [Oriented to Place] : oriented to place [Oriented to Time] : oriented to time [Calm] : calm [de-identified] : comfortable  [de-identified] : HTN [de-identified] : chronic OM and acute right 1st metatarsal right foot  [de-identified] :  1st metatarsal right foot , s/p met head resection , probes to bone  [de-identified] : peripheral neuropathy  [FreeTextEntry1] : Right plantar 1st met [FreeTextEntry2] : 2.5 [FreeTextEntry3] : 2.5 [FreeTextEntry4] : 2.-0 [de-identified] : serosanguineous [de-identified] : macerated [de-identified] : 50% [de-identified] : 50% [de-identified] : 1" Iodoform packing [de-identified] : NSC\par \par Dressing applied by DPM*** [FreeTextEntry7] : Right dorsal foot 1st metatarsal - sutures in place - clean, dry, intact [de-identified] : serosanguineous [de-identified] : erythema [de-identified] : silver alginate [de-identified] : NSC\par \par Dressing applied by DPM*** [TWNoteComboBox4] : Moderate [de-identified] : Moderate [de-identified] : Yes [de-identified] : Small [de-identified] : Moderate

## 2020-08-26 NOTE — PHYSICAL EXAM
[General Appearance - Alert] : alert [Sclera] : the sclera and conjunctiva were normal [General Appearance - In No Acute Distress] : in no acute distress [PERRL With Normal Accommodation] : pupils were equal in size, round, reactive to light [Outer Ear] : the ears and nose were normal in appearance [Extraocular Movements] : extraocular movements were intact [Oropharynx] : the oropharynx was normal with no thrush [Neck Appearance] : the appearance of the neck was normal [Neck Cervical Mass (___cm)] : no neck mass was observed [Jugular Venous Distention Increased] : there was no jugular-venous distention [Thyroid Diffuse Enlargement] : the thyroid was not enlarged [Heart Rate And Rhythm] : heart rate was normal and rhythm regular [Heart Sounds] : normal S1 and S2 [Auscultation Breath Sounds / Voice Sounds] : lungs were clear to auscultation bilaterally [Heart Sounds Gallop] : no gallops [Heart Sounds Pericardial Friction Rub] : no pericardial rub [Murmurs] : no murmurs [Edema] : there was no peripheral edema [Full Pulse] : the pedal pulses are present [Abdomen Tenderness] : non-tender [Abdomen Soft] : soft [Bowel Sounds] : normal bowel sounds [] : no hepato-splenomegaly [Abdomen Mass (___ Cm)] : no abdominal mass palpated [Costovertebral Angle Tenderness] : no CVA tenderness [No Palpable Adenopathy] : no palpable adenopathy [Deep Tendon Reflexes (DTR)] : deep tendon reflexes were 2+ and symmetric [No Focal Deficits] : no focal deficits [Sensation] : the sensory exam was normal to light touch and pinprick [Oriented To Time, Place, And Person] : oriented to person, place, and time [FreeTextEntry1] : neuropathy in feet  [Affect] : the affect was normal

## 2020-08-27 ENCOUNTER — APPOINTMENT (OUTPATIENT)
Dept: WOUND CARE | Facility: HOSPITAL | Age: 70
End: 2020-08-27
Payer: MEDICARE

## 2020-08-27 ENCOUNTER — OUTPATIENT (OUTPATIENT)
Dept: OUTPATIENT SERVICES | Facility: HOSPITAL | Age: 70
LOS: 1 days | Discharge: HOME | End: 2020-08-27
Payer: COMMERCIAL

## 2020-08-27 VITALS
DIASTOLIC BLOOD PRESSURE: 83 MMHG | HEART RATE: 83 BPM | HEIGHT: 75 IN | BODY MASS INDEX: 29.22 KG/M2 | OXYGEN SATURATION: 98 % | TEMPERATURE: 98.7 F | WEIGHT: 235 LBS | RESPIRATION RATE: 20 BRPM | SYSTOLIC BLOOD PRESSURE: 133 MMHG

## 2020-08-27 DIAGNOSIS — M86.171 OTHER ACUTE OSTEOMYELITIS, RIGHT ANKLE AND FOOT: ICD-10-CM

## 2020-08-27 DIAGNOSIS — Z85.828 PERSONAL HISTORY OF OTHER MALIGNANT NEOPLASM OF SKIN: Chronic | ICD-10-CM

## 2020-08-27 DIAGNOSIS — L89.893 PRESSURE ULCER OF OTHER SITE, STAGE 3: ICD-10-CM

## 2020-08-27 DIAGNOSIS — M86.671 OTHER CHRONIC OSTEOMYELITIS, RIGHT ANKLE AND FOOT: ICD-10-CM

## 2020-08-27 PROCEDURE — ZZZZZ: CPT

## 2020-08-27 PROCEDURE — G0463: CPT

## 2020-08-27 NOTE — VITALS
[de-identified] : Pain scale:  0/10 - Patient reports no c/o pains or discomforts at present. [] : No [FreeTextEntry5] : Cefpodoxime

## 2020-08-27 NOTE — REVIEW OF SYSTEMS
[Arthralgias] : arthralgias [Joint Stiffness] : joint stiffness [Skin Wound] : skin wound [Negative] : Endocrine [Fever] : no fever [Chills] : no chills [Eye Pain] : no eye pain [Loss Of Hearing] : no hearing loss [Shortness Of Breath] : no shortness of breath [Wheezing] : no wheezing [Abdominal Pain] : no abdominal pain [Vomiting] : no vomiting [Anxiety] : no anxiety [Easy Bleeding] : no tendency for easy bleeding [Easy Bruising] : no tendency for easy bruising [FreeTextEntry5] : HTN [FreeTextEntry9] : Hammer toes and forefoot deformity  [de-identified] : Stage 3 pressure ulcer plantar 1st met right foot , skin, subcutaneous and fat associated with 1st met hypertrophic spurring  [de-identified] : peripheral neuropathy

## 2020-08-27 NOTE — PLAN
[FreeTextEntry1] : Patient left the hospital AMA , his pathology is chronic and acute OM of the 1st metatarsal right foot . There is increased drainage and it is serous sanguinous and there is a slight odor . Patient will meet with ID doctor tomorrow for possible PIC line , IV antibiotics . The ulcer was flushed and packed today .

## 2020-08-27 NOTE — ASSESSMENT
[Verbal] : Verbal [Patient] : Patient [Good - alert, interested, motivated] : Good - alert, interested, motivated [Verbalizes knowledge/Understanding] : Verbalizes knowledge/understanding [Dressing changes] : dressing changes [Foot Care] : foot care [Signs and symptoms of infection] : sign and symptoms of infection [Skin Care] : skin care [How and When to Call] : how and when to call [Labs and Tests] : labs and tests [Patient responsibility to plan of care] : patient responsibility to plan of care [Off-loading] : off-loading [Home] : Home [Stable] : stable [Not Applicable - Long Term Care/Home Health Agency] : Long Term Care/Home Health Agency: Not Applicable [Ambulatory] : Ambulatory [] : No [FreeTextEntry2] : Promote optimal skin integrity, offloading, infection control\par  [FreeTextEntry4] : S/P right foot 1st metatarsal resection on 8/19/20\par Right foot with erythema\par Swab C&S done\par Pathology result discussed with patient - ex acute osteomyelitis of 1st metatarsal\par ID consult - Dr. Esdras Mcguire to be contacted by unit nurse manager \par Patient to return tomorrow to have packing removed and physician assessment\par Educated patient on s/s of infection and instructed patient to go to the ER if symptoms worsen.  Patient verbalized understanding.\par f/u 1 week\par  [FreeTextEntry3] : Right foot erythema, maceration, foul odor

## 2020-08-27 NOTE — PHYSICAL EXAM
[4 x 4] : 4 x 4  [Abdominal Pad] : Abdominal Pad [Normal Breath Sounds] : Normal breath sounds [1+] : right 1+ [No Rash or Lesion] : No rash or lesion [Skin Ulcer] : ulcer [Alert] : alert [Oriented to Person] : oriented to person [Oriented to Place] : oriented to place [Oriented to Time] : oriented to time [Calm] : calm [Purpura] : no purpura  [Petechiae] : no petechiae [de-identified] : comfortable  [de-identified] : chronic OM and acute right 1st metatarsal right foot  [de-identified] : HTN [de-identified] :  1st metatarsal right foot , s/p met head resection  [de-identified] : peripheral neuropathy  [FreeTextEntry1] : Right plantar 1st met [FreeTextEntry2] : 2.5 [FreeTextEntry4] : 2.-0 [FreeTextEntry3] : 2.5 [de-identified] : serosanguineous [de-identified] : macerated [de-identified] : 50% [de-identified] : 1" Iodoform packing [de-identified] : 50% [de-identified] : NSC [FreeTextEntry7] : Right dorsal foot 1st metatarsal - sutures in place - clean, dry, intact [de-identified] : serosanguineous [de-identified] : erythema [de-identified] : silver alginate [de-identified] : NSC [TWNoteComboBox4] : Moderate [de-identified] : Moderate [de-identified] : Yes [de-identified] : Small [de-identified] : Moderate

## 2020-08-28 ENCOUNTER — EMERGENCY (EMERGENCY)
Facility: HOSPITAL | Age: 70
LOS: 1 days | Discharge: HOME | End: 2020-08-28
Attending: STUDENT IN AN ORGANIZED HEALTH CARE EDUCATION/TRAINING PROGRAM | Admitting: EMERGENCY MEDICINE
Payer: COMMERCIAL

## 2020-08-28 ENCOUNTER — APPOINTMENT (OUTPATIENT)
Dept: WOUND CARE | Facility: HOSPITAL | Age: 70
End: 2020-08-28
Payer: MEDICARE

## 2020-08-28 ENCOUNTER — OUTPATIENT (OUTPATIENT)
Dept: OUTPATIENT SERVICES | Facility: HOSPITAL | Age: 70
LOS: 1 days | Discharge: ANOTHER HOSPITAL | End: 2020-08-28
Payer: COMMERCIAL

## 2020-08-28 VITALS
SYSTOLIC BLOOD PRESSURE: 144 MMHG | TEMPERATURE: 99 F | OXYGEN SATURATION: 97 % | HEIGHT: 75 IN | HEART RATE: 76 BPM | WEIGHT: 235.01 LBS | RESPIRATION RATE: 19 BRPM | DIASTOLIC BLOOD PRESSURE: 92 MMHG

## 2020-08-28 VITALS
WEIGHT: 235 LBS | RESPIRATION RATE: 20 BRPM | HEIGHT: 75 IN | HEART RATE: 86 BPM | SYSTOLIC BLOOD PRESSURE: 119 MMHG | OXYGEN SATURATION: 99 % | TEMPERATURE: 97.7 F | BODY MASS INDEX: 29.22 KG/M2 | DIASTOLIC BLOOD PRESSURE: 74 MMHG

## 2020-08-28 VITALS
OXYGEN SATURATION: 99 % | RESPIRATION RATE: 16 BRPM | TEMPERATURE: 98 F | HEART RATE: 91 BPM | SYSTOLIC BLOOD PRESSURE: 169 MMHG | DIASTOLIC BLOOD PRESSURE: 85 MMHG

## 2020-08-28 DIAGNOSIS — L89.893 PRESSURE ULCER OF OTHER SITE, STAGE 3: ICD-10-CM

## 2020-08-28 DIAGNOSIS — M86.671 OTHER CHRONIC OSTEOMYELITIS, RIGHT ANKLE AND FOOT: ICD-10-CM

## 2020-08-28 DIAGNOSIS — Z85.828 PERSONAL HISTORY OF OTHER MALIGNANT NEOPLASM OF SKIN: Chronic | ICD-10-CM

## 2020-08-28 DIAGNOSIS — M86.171 OTHER ACUTE OSTEOMYELITIS, RIGHT ANKLE AND FOOT: ICD-10-CM

## 2020-08-28 LAB
-  AMIKACIN: SIGNIFICANT CHANGE UP
-  AMOXICILLIN/CLAVULANIC ACID: SIGNIFICANT CHANGE UP
-  AMPICILLIN/SULBACTAM: SIGNIFICANT CHANGE UP
-  AMPICILLIN: SIGNIFICANT CHANGE UP
-  AMPICILLIN: SIGNIFICANT CHANGE UP
-  AZTREONAM: SIGNIFICANT CHANGE UP
-  CEFAZOLIN: SIGNIFICANT CHANGE UP
-  CEFEPIME: SIGNIFICANT CHANGE UP
-  CEFOXITIN: SIGNIFICANT CHANGE UP
-  CEFTRIAXONE: SIGNIFICANT CHANGE UP
-  CIPROFLOXACIN: SIGNIFICANT CHANGE UP
-  ERTAPENEM: SIGNIFICANT CHANGE UP
-  GENTAMICIN: SIGNIFICANT CHANGE UP
-  IMIPENEM: SIGNIFICANT CHANGE UP
-  LEVOFLOXACIN: SIGNIFICANT CHANGE UP
-  MEROPENEM: SIGNIFICANT CHANGE UP
-  PIPERACILLIN/TAZOBACTAM: SIGNIFICANT CHANGE UP
-  TETRACYCLINE: SIGNIFICANT CHANGE UP
-  TOBRAMYCIN: SIGNIFICANT CHANGE UP
-  TRIMETHOPRIM/SULFAMETHOXAZOLE: SIGNIFICANT CHANGE UP
-  VANCOMYCIN: SIGNIFICANT CHANGE UP
ALBUMIN SERPL ELPH-MCNC: 3.2 G/DL — LOW (ref 3.3–5)
ALP SERPL-CCNC: 81 U/L — SIGNIFICANT CHANGE UP (ref 40–120)
ALT FLD-CCNC: 36 U/L — SIGNIFICANT CHANGE UP (ref 12–78)
ANION GAP SERPL CALC-SCNC: 3 MMOL/L — LOW (ref 5–17)
APTT BLD: 50.5 SEC — HIGH (ref 27.5–35.5)
AST SERPL-CCNC: 18 U/L — SIGNIFICANT CHANGE UP (ref 15–37)
BILIRUB SERPL-MCNC: 0.5 MG/DL — SIGNIFICANT CHANGE UP (ref 0.2–1.2)
BUN SERPL-MCNC: 14 MG/DL — SIGNIFICANT CHANGE UP (ref 7–23)
CALCIUM SERPL-MCNC: 9.2 MG/DL — SIGNIFICANT CHANGE UP (ref 8.5–10.1)
CHLORIDE SERPL-SCNC: 107 MMOL/L — SIGNIFICANT CHANGE UP (ref 96–108)
CO2 SERPL-SCNC: 27 MMOL/L — SIGNIFICANT CHANGE UP (ref 22–31)
CREAT SERPL-MCNC: 1 MG/DL — SIGNIFICANT CHANGE UP (ref 0.5–1.3)
CULTURE RESULTS: SIGNIFICANT CHANGE UP
GLUCOSE SERPL-MCNC: 91 MG/DL — SIGNIFICANT CHANGE UP (ref 70–99)
HCT VFR BLD CALC: 41.1 % — SIGNIFICANT CHANGE UP (ref 39–50)
HGB BLD-MCNC: 13.7 G/DL — SIGNIFICANT CHANGE UP (ref 13–17)
INR BLD: 2.94 RATIO — HIGH (ref 0.88–1.16)
MCHC RBC-ENTMCNC: 30.6 PG — SIGNIFICANT CHANGE UP (ref 27–34)
MCHC RBC-ENTMCNC: 33.3 GM/DL — SIGNIFICANT CHANGE UP (ref 32–36)
MCV RBC AUTO: 91.9 FL — SIGNIFICANT CHANGE UP (ref 80–100)
METHOD TYPE: SIGNIFICANT CHANGE UP
METHOD TYPE: SIGNIFICANT CHANGE UP
NRBC # BLD: 0 /100 WBCS — SIGNIFICANT CHANGE UP (ref 0–0)
ORGANISM # SPEC MICROSCOPIC CNT: SIGNIFICANT CHANGE UP
PLATELET # BLD AUTO: 289 K/UL — SIGNIFICANT CHANGE UP (ref 150–400)
POTASSIUM SERPL-MCNC: 3.9 MMOL/L — SIGNIFICANT CHANGE UP (ref 3.5–5.3)
POTASSIUM SERPL-SCNC: 3.9 MMOL/L — SIGNIFICANT CHANGE UP (ref 3.5–5.3)
PROT SERPL-MCNC: 7.2 G/DL — SIGNIFICANT CHANGE UP (ref 6–8.3)
PROTHROM AB SERPL-ACNC: 32.7 SEC — HIGH (ref 10.6–13.6)
RBC # BLD: 4.47 M/UL — SIGNIFICANT CHANGE UP (ref 4.2–5.8)
RBC # FLD: 13.2 % — SIGNIFICANT CHANGE UP (ref 10.3–14.5)
SODIUM SERPL-SCNC: 137 MMOL/L — SIGNIFICANT CHANGE UP (ref 135–145)
SPECIMEN SOURCE: SIGNIFICANT CHANGE UP
WBC # BLD: 6.73 K/UL — SIGNIFICANT CHANGE UP (ref 3.8–10.5)
WBC # FLD AUTO: 6.73 K/UL — SIGNIFICANT CHANGE UP (ref 3.8–10.5)

## 2020-08-28 PROCEDURE — G0463: CPT

## 2020-08-28 PROCEDURE — 36415 COLL VENOUS BLD VENIPUNCTURE: CPT

## 2020-08-28 PROCEDURE — 85730 THROMBOPLASTIN TIME PARTIAL: CPT

## 2020-08-28 PROCEDURE — 36569 INSJ PICC 5 YR+ W/O IMAGING: CPT

## 2020-08-28 PROCEDURE — 36573 INSJ PICC RS&I 5 YR+: CPT

## 2020-08-28 PROCEDURE — C1751: CPT

## 2020-08-28 PROCEDURE — 99284 EMERGENCY DEPT VISIT MOD MDM: CPT

## 2020-08-28 PROCEDURE — 76937 US GUIDE VASCULAR ACCESS: CPT

## 2020-08-28 PROCEDURE — ZZZZZ: CPT

## 2020-08-28 PROCEDURE — 77001 FLUOROGUIDE FOR VEIN DEVICE: CPT

## 2020-08-28 PROCEDURE — 85027 COMPLETE CBC AUTOMATED: CPT

## 2020-08-28 PROCEDURE — 96365 THER/PROPH/DIAG IV INF INIT: CPT

## 2020-08-28 PROCEDURE — 85610 PROTHROMBIN TIME: CPT

## 2020-08-28 PROCEDURE — 99285 EMERGENCY DEPT VISIT HI MDM: CPT | Mod: 25

## 2020-08-28 PROCEDURE — 80053 COMPREHEN METABOLIC PANEL: CPT

## 2020-08-28 RX ORDER — SODIUM CHLORIDE 9 MG/ML
10 INJECTION INTRAMUSCULAR; INTRAVENOUS; SUBCUTANEOUS
Refills: 0 | Status: DISCONTINUED | OUTPATIENT
Start: 2020-08-28 | End: 2020-09-01

## 2020-08-28 RX ORDER — CHLORHEXIDINE GLUCONATE 213 G/1000ML
1 SOLUTION TOPICAL
Refills: 0 | Status: DISCONTINUED | OUTPATIENT
Start: 2020-08-28 | End: 2020-09-01

## 2020-08-28 RX ORDER — SODIUM CHLORIDE 9 MG/ML
3 INJECTION INTRAMUSCULAR; INTRAVENOUS; SUBCUTANEOUS EVERY 8 HOURS
Refills: 0 | Status: DISCONTINUED | OUTPATIENT
Start: 2020-08-28 | End: 2020-08-28

## 2020-08-28 RX ORDER — ERTAPENEM SODIUM 1 G/1
1000 INJECTION, POWDER, LYOPHILIZED, FOR SOLUTION INTRAMUSCULAR; INTRAVENOUS ONCE
Refills: 0 | Status: COMPLETED | OUTPATIENT
Start: 2020-08-28 | End: 2020-08-28

## 2020-08-28 RX ADMIN — Medication 100 UNIT(S): at 20:34

## 2020-08-28 RX ADMIN — ERTAPENEM SODIUM 1000 MILLIGRAM(S): 1 INJECTION, POWDER, LYOPHILIZED, FOR SOLUTION INTRAMUSCULAR; INTRAVENOUS at 18:00

## 2020-08-28 RX ADMIN — ERTAPENEM SODIUM 120 MILLIGRAM(S): 1 INJECTION, POWDER, LYOPHILIZED, FOR SOLUTION INTRAMUSCULAR; INTRAVENOUS at 17:25

## 2020-08-28 NOTE — ED ADULT TRIAGE NOTE - HEIGHT IN INCHES
3 Azithromycin Counseling:  I discussed with the patient the risks of azithromycin including but not limited to GI upset, allergic reaction, drug rash, diarrhea, and yeast infections.

## 2020-08-28 NOTE — ED PROVIDER NOTE - OBJECTIVE STATEMENT
70 yo male with h/o neuropathy 2/2 back surgery, HTN sent to ED by podiatry for PICC line placement and IV abx. Patient was recently admitted to hospital on 8/18, dc on 8/19 for wound to right 1st metatarsal. Patient was dced on vantin, pending culture results. Patient with no complaints at this time. Patient was seen in podiatry/wound care office today and sent to ED for PICC line. Denies fever, chills, chest pain, sob, abd pain, N/V, UE/LE weakness or paresthesias.

## 2020-08-28 NOTE — ED PROVIDER NOTE - CLINICAL SUMMARY MEDICAL DECISION MAKING FREE TEXT BOX
Patient sent from wound clinic for PICC line insertion for osteo. PICC line placed  by IR, case discussed with Dr. Dahl (podiatry) and Dr. Frye (ID), recommend ertapenem. Visiting nurse service set up by case management.

## 2020-08-28 NOTE — ED ADULT NURSE NOTE - NSIMPLEMENTINTERV_GEN_ALL_ED
Implemented All Universal Safety Interventions:  [place] to call system. Call bell, personal items and telephone within reach. Instruct patient to call for assistance. Room bathroom lighting operational. Non-slip footwear when patient is off stretcher. Physically safe environment: no spills, clutter or unnecessary equipment. Stretcher in lowest position, wheels locked, appropriate side rails in place.

## 2020-08-28 NOTE — ED PROVIDER NOTE - PATIENT PORTAL LINK FT
You can access the FollowMyHealth Patient Portal offered by [facility] by registering at the following website: [email or web address]. By joining [facility]’s FollowMyHealth portal, you will also be able to view your health information using other applications (apps) compatible with our system.

## 2020-08-28 NOTE — ED PROVIDER NOTE - ATTENDING CONTRIBUTION TO CARE
70 yo M sp foot surg on 8/18 - being followed by wound care center. 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 Pt being followed daily at wound care - sent to ed today for PICC line so they can set up home IV abx. NO fever/chills. no cp/sob/palp. no weakness / dizziness. no numb/ting/focal weak. no agg/allev factors. No other acute findings.  Exam; MM MOist. non-toxic, well appearing. neck supple. no meningeal signs. abd non-dist. R foot dressed - exam deferred. No other acute findings.  IR to place PICC, SW to arrange abx, ID consultation for determination

## 2020-08-28 NOTE — ED ADULT TRIAGE NOTE - CHIEF COMPLAINT QUOTE
Sent in from wound care center by MD Mcrae for PICC line insertion. Pt. post right foot bone re-section w/ infection on 8/19.

## 2020-08-28 NOTE — ED PROVIDER NOTE - PROGRESS NOTE DETAILS
Patient currently in IR receiving PICC line, social work/case management contacted to set up outpatient abx, will get in touch with podiatry as well. Case discussed with podiatry Dr. Dahl - recommend getting in touch with ID for abx recommendation Case discussed with Dr. Frye (ID) who saw patient in hospital for ID consult, recommends ertapenem 1 g once a day for 28 days Spoke with case management Sandie, trying to set up visiting nurse. Case discussed with Ashtyn, IV abx sent to pharmacy, visiting nurse set up for tomorrow.

## 2020-08-28 NOTE — ED ADULT NURSE REASSESSMENT NOTE - NS ED NURSE REASSESS COMMENT FT1
IRD nurses note, PICC insertion, Mc RN:    Received via stretcher @6581 for PICC insertion. Oriented to procedure & routine. Seen by Dr Reynoso. Procedure explained, questions ans, consent obtained. 4F Single lumen Power PICC placed right arm by Dr Reynoso. DSD to site. No bleeding or hematoma noted. Catheter card & info to pt. Tong procedure well, report to [facility]. Pt to ER via stretcher in stable condition @8817. See PICC flow sheet in SCM.  - Doroteo [facility] IRD [phone]

## 2020-08-28 NOTE — ED ADULT NURSE NOTE - PMH
Afib  s/p cardioversion  Arthritis  hx of multiple knee surgery  Hypertension    Melanocytic nevi of left ear  s/p excision

## 2020-08-28 NOTE — ED PROVIDER NOTE - CARE PROVIDER_API CALL
Celsetine Mcrae  PODIATRIC MEDICINE AND SURGERY  [address] [place] [address]  Phone: [phone]  Fax: [phone]  Follow Up Time: 1-3 Days
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
## 2020-08-28 NOTE — ED ADULT NURSE NOTE - CHPI ED NUR SYMPTOMS NEG
no weakness/no tingling/no pain/no vomiting/no fever/no nausea/no dizziness/no decreased eating/drinking/no chills

## 2020-08-29 DIAGNOSIS — M86.171 OTHER ACUTE OSTEOMYELITIS, RIGHT ANKLE AND FOOT: ICD-10-CM

## 2020-08-29 DIAGNOSIS — Z79.01 LONG TERM (CURRENT) USE OF ANTICOAGULANTS: ICD-10-CM

## 2020-08-29 DIAGNOSIS — I11.9 HYPERTENSIVE HEART DISEASE WITHOUT HEART FAILURE: ICD-10-CM

## 2020-08-29 DIAGNOSIS — Z79.899 OTHER LONG TERM (CURRENT) DRUG THERAPY: ICD-10-CM

## 2020-08-29 DIAGNOSIS — Z84.0 FAMILY HISTORY OF DISEASES OF THE SKIN AND SUBCUTANEOUS TISSUE: ICD-10-CM

## 2020-08-29 DIAGNOSIS — Z79.82 LONG TERM (CURRENT) USE OF ASPIRIN: ICD-10-CM

## 2020-08-29 DIAGNOSIS — I48.91 UNSPECIFIED ATRIAL FIBRILLATION: ICD-10-CM

## 2020-08-29 DIAGNOSIS — Z85.820 PERSONAL HISTORY OF MALIGNANT MELANOMA OF SKIN: ICD-10-CM

## 2020-08-29 DIAGNOSIS — G62.9 POLYNEUROPATHY, UNSPECIFIED: ICD-10-CM

## 2020-08-29 DIAGNOSIS — Z87.828 PERSONAL HISTORY OF OTHER (HEALED) PHYSICAL INJURY AND TRAUMA: ICD-10-CM

## 2020-08-29 DIAGNOSIS — Z82.49 FAMILY HISTORY OF ISCHEMIC HEART DISEASE AND OTHER DISEASES OF THE CIRCULATORY SYSTEM: ICD-10-CM

## 2020-08-29 DIAGNOSIS — M86.672 OTHER CHRONIC OSTEOMYELITIS, LEFT ANKLE AND FOOT: ICD-10-CM

## 2020-08-29 DIAGNOSIS — K57.32 DIVERTICULITIS OF LARGE INTESTINE WITHOUT PERFORATION OR ABSCESS WITHOUT BLEEDING: ICD-10-CM

## 2020-08-29 DIAGNOSIS — L89.893 PRESSURE ULCER OF OTHER SITE, STAGE 3: ICD-10-CM

## 2020-08-31 ENCOUNTER — OUTPATIENT (OUTPATIENT)
Dept: OUTPATIENT SERVICES | Facility: HOSPITAL | Age: 70
LOS: 1 days | Discharge: HOME | End: 2020-08-31
Payer: COMMERCIAL

## 2020-08-31 ENCOUNTER — APPOINTMENT (OUTPATIENT)
Dept: WOUND CARE | Facility: HOSPITAL | Age: 70
End: 2020-08-31
Payer: MEDICARE

## 2020-08-31 VITALS
RESPIRATION RATE: 18 BRPM | OXYGEN SATURATION: 99 % | TEMPERATURE: 98.3 F | DIASTOLIC BLOOD PRESSURE: 92 MMHG | SYSTOLIC BLOOD PRESSURE: 140 MMHG | HEART RATE: 74 BPM

## 2020-08-31 DIAGNOSIS — L89.893 PRESSURE ULCER OF OTHER SITE, STAGE 3: ICD-10-CM

## 2020-08-31 DIAGNOSIS — Z85.828 PERSONAL HISTORY OF OTHER MALIGNANT NEOPLASM OF SKIN: Chronic | ICD-10-CM

## 2020-08-31 PROCEDURE — G0463: CPT

## 2020-08-31 PROCEDURE — ZZZZZ: CPT

## 2020-09-02 ENCOUNTER — APPOINTMENT (OUTPATIENT)
Dept: WOUND CARE | Facility: HOSPITAL | Age: 70
End: 2020-09-02
Payer: MEDICARE

## 2020-09-02 ENCOUNTER — OUTPATIENT (OUTPATIENT)
Dept: OUTPATIENT SERVICES | Facility: HOSPITAL | Age: 70
LOS: 1 days | Discharge: HOME | End: 2020-09-02
Payer: COMMERCIAL

## 2020-09-02 VITALS
DIASTOLIC BLOOD PRESSURE: 74 MMHG | WEIGHT: 235 LBS | RESPIRATION RATE: 20 BRPM | HEART RATE: 80 BPM | TEMPERATURE: 97.2 F | HEIGHT: 75 IN | OXYGEN SATURATION: 98 % | BODY MASS INDEX: 29.22 KG/M2 | SYSTOLIC BLOOD PRESSURE: 120 MMHG

## 2020-09-02 DIAGNOSIS — Z85.828 PERSONAL HISTORY OF OTHER MALIGNANT NEOPLASM OF SKIN: Chronic | ICD-10-CM

## 2020-09-02 DIAGNOSIS — L89.893 PRESSURE ULCER OF OTHER SITE, STAGE 3: ICD-10-CM

## 2020-09-02 PROCEDURE — 99024 POSTOP FOLLOW-UP VISIT: CPT

## 2020-09-02 PROCEDURE — G0463: CPT

## 2020-09-02 NOTE — VITALS
[] : No [de-identified] : Patient denies any pain or discomfort at the present [FreeTextEntry5] : Pt began antibiotic via PICC line Friday 8/28/2020 6 tx of Ertapenem; pt unsure of dose

## 2020-09-02 NOTE — PLAN
[FreeTextEntry1] : Patient examined and evaluated at this time.\par Continue local wound care and offloading.\par Pt to follow up in 1 week.\par Cont IV abx.

## 2020-09-02 NOTE — PHYSICAL EXAM
[4 x 4] : 4 x 4  [Abdominal Pad] : Abdominal Pad [Normal Breath Sounds] : Normal breath sounds [1+] : left 1+ [No Rash or Lesion] : No rash or lesion [Petechiae] : no petechiae [Purpura] : no purpura  [Alert] : alert [Skin Ulcer] : ulcer [Oriented to Place] : oriented to place [Oriented to Person] : oriented to person [Oriented to Time] : oriented to time [Calm] : calm [de-identified] : chronic OM and acute right 1st metatarsal right foot  [de-identified] : HTN [de-identified] : comfortable  [de-identified] : peripheral neuropathy  [de-identified] :  1st metatarsal right foot , s/p met head resection , probes to bone , healing [de-identified] : No neurovascular deficit noted  [FreeTextEntry2] : 1.4 [FreeTextEntry1] : Plantar 1st Met  [de-identified] : Scant Serosanguineous  [FreeTextEntry3] : 1.5 [FreeTextEntry4] : 0.2 [de-identified] : Pt denies any pain or discomfort post ACE application  [de-identified] : 25% [de-identified] : Ag Alginate [de-identified] : Mild  [de-identified] : Cleansed with Normal Saline \par Kerlix  [FreeTextEntry7] : Dorsal Foot 1st Met - Sutures intact  [de-identified] : No neurovascular deficit noted  [FreeTextEntry8] : 2.0 [de-identified] : 0.1 [FreeTextEntry9] : 1.2 [de-identified] : 25% [de-identified] : Mild  [de-identified] : Pt denies any pain or discomfort post ACE application  [de-identified] : Sanguineous  [de-identified] : Ag Alginate  [de-identified] : Cleansed with Normal Saline \par Kerlix  [TWNoteComboBox5] : No [TWNoteComboBox1] : Right [de-identified] : No [de-identified] : Erythema [de-identified] : None [de-identified] : >75% [de-identified] : 3x Weekly [de-identified] : Yes [de-identified] : Ace wraps [TWNoteComboBox9] : Right [de-identified] : Large [de-identified] : No [de-identified] : No [de-identified] : None [de-identified] : Erythema [de-identified] : Yes [de-identified] : >75% [de-identified] : 3x Weekly [de-identified] : Ace wraps

## 2020-09-02 NOTE — ASSESSMENT
[Verbal] : Verbal [Patient] : Patient [Written] : Written [Good - alert, interested, motivated] : Good - alert, interested, motivated [Foot Care] : foot care [Dressing changes] : dressing changes [Verbalizes knowledge/Understanding] : Verbalizes knowledge/understanding [Signs and symptoms of infection] : sign and symptoms of infection [Skin Care] : skin care [Pressure relief] : pressure relief [Off-loading] : off-loading [How and When to Call] : how and when to call [Compression Therapy] : compression therapy [] : Yes [Patient responsibility to plan of care] : patient responsibility to plan of care [Stable] : stable [Home] : Home [Ambulatory] : Ambulatory [Not Applicable - Long Term Care/Home Health Agency] : Long Term Care/Home Health Agency: Not Applicable [FreeTextEntry2] : Infection Prevention\par Localized Wound Care\par Compression Therapy \par Promote optimal skin integrity \par  [FreeTextEntry4] : Bandage change at [facility] on Friday 9/4/2020 and Monday 9/7/2020 assessment with physician on Wednesday 9/9/2020

## 2020-09-02 NOTE — HISTORY OF PRESENT ILLNESS
[FreeTextEntry1] : s/p 1st metatarsal head resectioon right foot, pathology positive for OM, patient was seen by ID and has a PICC line placed for IV abx.

## 2020-09-02 NOTE — REVIEW OF SYSTEMS
[Fever] : no fever [Chills] : no chills [Loss Of Hearing] : no hearing loss [Eye Pain] : no eye pain [Shortness Of Breath] : no shortness of breath [Wheezing] : no wheezing [Abdominal Pain] : no abdominal pain [Vomiting] : no vomiting [Arthralgias] : arthralgias [Joint Stiffness] : joint stiffness [Anxiety] : no anxiety [Skin Wound] : skin wound [Easy Bleeding] : no tendency for easy bleeding [Easy Bruising] : no tendency for easy bruising [FreeTextEntry5] : HTN [Negative] : Endocrine [de-identified] : Stage 3 pressure ulcer plantar 1st met right foot , skin, subcutaneous and fat associated with 1st met hypertrophic spurring  [FreeTextEntry9] : Hammer toes and forefoot deformity  [de-identified] : peripheral neuropathy

## 2020-09-03 DIAGNOSIS — Z84.0 FAMILY HISTORY OF DISEASES OF THE SKIN AND SUBCUTANEOUS TISSUE: ICD-10-CM

## 2020-09-03 DIAGNOSIS — M86.171 OTHER ACUTE OSTEOMYELITIS, RIGHT ANKLE AND FOOT: ICD-10-CM

## 2020-09-03 DIAGNOSIS — K57.32 DIVERTICULITIS OF LARGE INTESTINE WITHOUT PERFORATION OR ABSCESS WITHOUT BLEEDING: ICD-10-CM

## 2020-09-03 DIAGNOSIS — Z79.899 OTHER LONG TERM (CURRENT) DRUG THERAPY: ICD-10-CM

## 2020-09-03 DIAGNOSIS — Z85.820 PERSONAL HISTORY OF MALIGNANT MELANOMA OF SKIN: ICD-10-CM

## 2020-09-03 DIAGNOSIS — I48.91 UNSPECIFIED ATRIAL FIBRILLATION: ICD-10-CM

## 2020-09-03 DIAGNOSIS — Z82.49 FAMILY HISTORY OF ISCHEMIC HEART DISEASE AND OTHER DISEASES OF THE CIRCULATORY SYSTEM: ICD-10-CM

## 2020-09-03 DIAGNOSIS — L89.893 PRESSURE ULCER OF OTHER SITE, STAGE 3: ICD-10-CM

## 2020-09-03 DIAGNOSIS — Z87.828 PERSONAL HISTORY OF OTHER (HEALED) PHYSICAL INJURY AND TRAUMA: ICD-10-CM

## 2020-09-03 DIAGNOSIS — I11.9 HYPERTENSIVE HEART DISEASE WITHOUT HEART FAILURE: ICD-10-CM

## 2020-09-03 DIAGNOSIS — M86.672 OTHER CHRONIC OSTEOMYELITIS, LEFT ANKLE AND FOOT: ICD-10-CM

## 2020-09-03 DIAGNOSIS — G62.9 POLYNEUROPATHY, UNSPECIFIED: ICD-10-CM

## 2020-09-04 ENCOUNTER — OUTPATIENT (OUTPATIENT)
Dept: OUTPATIENT SERVICES | Facility: HOSPITAL | Age: 70
LOS: 1 days | Discharge: HOME | End: 2020-09-04
Payer: COMMERCIAL

## 2020-09-04 ENCOUNTER — APPOINTMENT (OUTPATIENT)
Dept: OBGYN | Facility: HOSPITAL | Age: 70
End: 2020-09-04
Payer: MEDICARE

## 2020-09-04 VITALS
HEART RATE: 85 BPM | BODY MASS INDEX: 29.22 KG/M2 | SYSTOLIC BLOOD PRESSURE: 118 MMHG | WEIGHT: 235 LBS | OXYGEN SATURATION: 98 % | RESPIRATION RATE: 20 BRPM | TEMPERATURE: 98.4 F | HEIGHT: 75 IN | DIASTOLIC BLOOD PRESSURE: 72 MMHG

## 2020-09-04 DIAGNOSIS — Z85.828 PERSONAL HISTORY OF OTHER MALIGNANT NEOPLASM OF SKIN: Chronic | ICD-10-CM

## 2020-09-04 DIAGNOSIS — M86.171 OTHER ACUTE OSTEOMYELITIS, RIGHT ANKLE AND FOOT: ICD-10-CM

## 2020-09-04 DIAGNOSIS — L89.893 PRESSURE ULCER OF OTHER SITE, STAGE 3: ICD-10-CM

## 2020-09-04 DIAGNOSIS — M86.671 OTHER CHRONIC OSTEOMYELITIS, RIGHT ANKLE AND FOOT: ICD-10-CM

## 2020-09-04 PROCEDURE — G0463: CPT

## 2020-09-04 PROCEDURE — ZZZZZ: CPT

## 2020-09-09 ENCOUNTER — APPOINTMENT (OUTPATIENT)
Dept: OBGYN | Facility: HOSPITAL | Age: 70
End: 2020-09-09
Payer: MEDICARE

## 2020-09-09 ENCOUNTER — OUTPATIENT (OUTPATIENT)
Dept: OUTPATIENT SERVICES | Facility: HOSPITAL | Age: 70
LOS: 1 days | Discharge: HOME | End: 2020-09-09
Payer: COMMERCIAL

## 2020-09-09 VITALS
HEART RATE: 59 BPM | BODY MASS INDEX: 29.22 KG/M2 | SYSTOLIC BLOOD PRESSURE: 136 MMHG | DIASTOLIC BLOOD PRESSURE: 83 MMHG | TEMPERATURE: 97.2 F | RESPIRATION RATE: 20 BRPM | HEIGHT: 75 IN | WEIGHT: 235 LBS | OXYGEN SATURATION: 100 %

## 2020-09-09 DIAGNOSIS — Z85.828 PERSONAL HISTORY OF OTHER MALIGNANT NEOPLASM OF SKIN: Chronic | ICD-10-CM

## 2020-09-09 DIAGNOSIS — I83.218 VARICOSE VEINS OF RIGHT LOWER EXTREMITY WITH BOTH ULCER OF OTHER PART OF LOWER EXTREMITY AND INFLAMMATION: ICD-10-CM

## 2020-09-09 PROCEDURE — 99024 POSTOP FOLLOW-UP VISIT: CPT

## 2020-09-09 PROCEDURE — G0463: CPT

## 2020-09-09 NOTE — PHYSICAL EXAM
[4 x 4] : 4 x 4  [Abdominal Pad] : Abdominal Pad [Normal Breath Sounds] : Normal breath sounds [1+] : left 1+ [No Rash or Lesion] : No rash or lesion [Purpura] : no purpura  [Petechiae] : no petechiae [Skin Ulcer] : ulcer [Alert] : alert [Oriented to Person] : oriented to person [Oriented to Place] : oriented to place [Oriented to Time] : oriented to time [Calm] : calm [de-identified] : comfortable  [de-identified] : HTN [de-identified] : chronic OM and acute right 1st metatarsal right foot  [de-identified] :  1st metatarsal right foot , s/p met head resection , probes to bone , healing [de-identified] : peripheral neuropathy  [FreeTextEntry1] : Right Plantar 1st Met [FreeTextEntry2] : 1.1 [FreeTextEntry3] : 0.9 [FreeTextEntry4] : 0.3 [de-identified] : Serosanguineous [de-identified] : Callus [de-identified] : Silver Alginate [de-identified] : Cleansed with Normal Saline\par  [de-identified] : DPM Removed Sutures  [FreeTextEntry7] : Right Dorsal Foot 1st Met- Sutures Intact Mid Dehiscences [FreeTextEntry8] : 2.6 [FreeTextEntry9] : 1.8 [de-identified] : 0.2 [de-identified] : Serosanguineous [de-identified] : Intact [de-identified] : 25% [de-identified] : Silver Alginate [de-identified] : Cleansed with Normal Saline\par  [TWNoteComboBox4] : Small [TWNoteComboBox5] : No [de-identified] : No [de-identified] : None [de-identified] : None [de-identified] : 100% [de-identified] : No [de-identified] : 3x Weekly [de-identified] : Small [de-identified] : No [de-identified] : No [de-identified] : None [de-identified] : None [de-identified] : >75% [de-identified] : Yes [de-identified] : 3x Weekly

## 2020-09-09 NOTE — PLAN
[FreeTextEntry1] : Patient examined and evaluated at this time.\par Continue local wound care and offloading.\par All sutures removed\par Pt to follow up in 1 week.\par Cont IV abx.

## 2020-09-09 NOTE — REVIEW OF SYSTEMS
[Fever] : no fever [Chills] : no chills [Eye Pain] : no eye pain [Loss Of Hearing] : no hearing loss [Shortness Of Breath] : no shortness of breath [Wheezing] : no wheezing [Abdominal Pain] : no abdominal pain [Vomiting] : no vomiting [Arthralgias] : arthralgias [Joint Stiffness] : joint stiffness [Skin Wound] : skin wound [Anxiety] : no anxiety [Easy Bleeding] : no tendency for easy bleeding [Easy Bruising] : no tendency for easy bruising [Negative] : Endocrine [FreeTextEntry5] : HTN [FreeTextEntry9] : Hammer toes and forefoot deformity  [de-identified] : Stage 3 pressure ulcer plantar 1st met right foot , skin, subcutaneous and fat associated with 1st met hypertrophic spurring  [de-identified] : peripheral neuropathy

## 2020-09-09 NOTE — ASSESSMENT
[Patient] : Patient [Verbal] : Verbal [Good - alert, interested, motivated] : Good - alert, interested, motivated [Verbalizes knowledge/Understanding] : Verbalizes knowledge/understanding [Dressing changes] : dressing changes [Foot Care] : foot care [Pressure relief] : pressure relief [Skin Care] : skin care [Signs and symptoms of infection] : sign and symptoms of infection [Patient responsibility to plan of care] : patient responsibility to plan of care [How and When to Call] : how and when to call [Off-loading] : off-loading [Home] : Home [Ambulatory] : Ambulatory [Stable] : stable [Not Applicable - Long Term Care/Home Health Agency] : Long Term Care/Home Health Agency: Not Applicable [] : No [FreeTextEntry2] : Restore Skin Integrity\par Infection Control\par Localized wound care\par Maintain acceptable pain levels at satisfactory relief.\par Demonstrates use of both nonpharmacological and pharmacological pain relief strategies [FreeTextEntry4] : F/U to [facility] in 1 week for assessment, Saturday for Dressing Change if possible if not may come Friday.

## 2020-09-12 ENCOUNTER — OUTPATIENT (OUTPATIENT)
Dept: OUTPATIENT SERVICES | Facility: HOSPITAL | Age: 70
LOS: 1 days | Discharge: HOME | End: 2020-09-12
Payer: COMMERCIAL

## 2020-09-12 ENCOUNTER — APPOINTMENT (OUTPATIENT)
Dept: WOUND CARE | Facility: HOSPITAL | Age: 70
End: 2020-09-12
Payer: MEDICARE

## 2020-09-12 VITALS
WEIGHT: 235 LBS | RESPIRATION RATE: 18 BRPM | TEMPERATURE: 97.3 F | SYSTOLIC BLOOD PRESSURE: 96 MMHG | BODY MASS INDEX: 29.22 KG/M2 | HEART RATE: 74 BPM | DIASTOLIC BLOOD PRESSURE: 63 MMHG | HEIGHT: 75 IN | OXYGEN SATURATION: 99 %

## 2020-09-12 DIAGNOSIS — Z85.820 PERSONAL HISTORY OF MALIGNANT MELANOMA OF SKIN: ICD-10-CM

## 2020-09-12 DIAGNOSIS — Z85.828 PERSONAL HISTORY OF OTHER MALIGNANT NEOPLASM OF SKIN: Chronic | ICD-10-CM

## 2020-09-12 DIAGNOSIS — Z84.0 FAMILY HISTORY OF DISEASES OF THE SKIN AND SUBCUTANEOUS TISSUE: ICD-10-CM

## 2020-09-12 DIAGNOSIS — M86.171 OTHER ACUTE OSTEOMYELITIS, RIGHT ANKLE AND FOOT: ICD-10-CM

## 2020-09-12 DIAGNOSIS — Z48.02 ENCOUNTER FOR REMOVAL OF SUTURES: ICD-10-CM

## 2020-09-12 DIAGNOSIS — L89.893 PRESSURE ULCER OF OTHER SITE, STAGE 3: ICD-10-CM

## 2020-09-12 DIAGNOSIS — E11.621 TYPE 2 DIABETES MELLITUS WITH FOOT ULCER: ICD-10-CM

## 2020-09-12 DIAGNOSIS — Z87.828 PERSONAL HISTORY OF OTHER (HEALED) PHYSICAL INJURY AND TRAUMA: ICD-10-CM

## 2020-09-12 DIAGNOSIS — I11.9 HYPERTENSIVE HEART DISEASE WITHOUT HEART FAILURE: ICD-10-CM

## 2020-09-12 DIAGNOSIS — I48.91 UNSPECIFIED ATRIAL FIBRILLATION: ICD-10-CM

## 2020-09-12 DIAGNOSIS — Z79.899 OTHER LONG TERM (CURRENT) DRUG THERAPY: ICD-10-CM

## 2020-09-12 DIAGNOSIS — K57.32 DIVERTICULITIS OF LARGE INTESTINE WITHOUT PERFORATION OR ABSCESS WITHOUT BLEEDING: ICD-10-CM

## 2020-09-12 DIAGNOSIS — M86.671 OTHER CHRONIC OSTEOMYELITIS, RIGHT ANKLE AND FOOT: ICD-10-CM

## 2020-09-12 DIAGNOSIS — G62.9 POLYNEUROPATHY, UNSPECIFIED: ICD-10-CM

## 2020-09-12 DIAGNOSIS — Z82.49 FAMILY HISTORY OF ISCHEMIC HEART DISEASE AND OTHER DISEASES OF THE CIRCULATORY SYSTEM: ICD-10-CM

## 2020-09-12 PROCEDURE — ZZZZZ: CPT

## 2020-09-12 PROCEDURE — G0463: CPT

## 2020-09-15 ENCOUNTER — OUTPATIENT (OUTPATIENT)
Dept: OUTPATIENT SERVICES | Facility: HOSPITAL | Age: 70
LOS: 1 days | Discharge: HOME | End: 2020-09-15
Payer: COMMERCIAL

## 2020-09-15 ENCOUNTER — APPOINTMENT (OUTPATIENT)
Dept: PODIATRY | Facility: HOSPITAL | Age: 70
End: 2020-09-15
Payer: MEDICARE

## 2020-09-15 VITALS
HEIGHT: 75 IN | SYSTOLIC BLOOD PRESSURE: 106 MMHG | WEIGHT: 235 LBS | HEART RATE: 75 BPM | OXYGEN SATURATION: 99 % | RESPIRATION RATE: 18 BRPM | DIASTOLIC BLOOD PRESSURE: 70 MMHG | BODY MASS INDEX: 29.22 KG/M2 | TEMPERATURE: 97.8 F

## 2020-09-15 DIAGNOSIS — Z85.828 PERSONAL HISTORY OF OTHER MALIGNANT NEOPLASM OF SKIN: Chronic | ICD-10-CM

## 2020-09-15 DIAGNOSIS — E11.621 TYPE 2 DIABETES MELLITUS WITH FOOT ULCER: ICD-10-CM

## 2020-09-15 PROCEDURE — 99024 POSTOP FOLLOW-UP VISIT: CPT

## 2020-09-15 PROCEDURE — G0463: CPT

## 2020-09-16 DIAGNOSIS — T81.31XD DISRUPTION OF EXTERNAL OPERATION (SURGICAL) WOUND, NOT ELSEWHERE CLASSIFIED, SUBSEQUENT ENCOUNTER: ICD-10-CM

## 2020-09-16 DIAGNOSIS — M86.671 OTHER CHRONIC OSTEOMYELITIS, RIGHT ANKLE AND FOOT: ICD-10-CM

## 2020-09-16 DIAGNOSIS — Z87.828 PERSONAL HISTORY OF OTHER (HEALED) PHYSICAL INJURY AND TRAUMA: ICD-10-CM

## 2020-09-16 DIAGNOSIS — I48.91 UNSPECIFIED ATRIAL FIBRILLATION: ICD-10-CM

## 2020-09-16 DIAGNOSIS — G62.9 POLYNEUROPATHY, UNSPECIFIED: ICD-10-CM

## 2020-09-16 DIAGNOSIS — Z79.899 OTHER LONG TERM (CURRENT) DRUG THERAPY: ICD-10-CM

## 2020-09-16 DIAGNOSIS — Z85.820 PERSONAL HISTORY OF MALIGNANT MELANOMA OF SKIN: ICD-10-CM

## 2020-09-16 DIAGNOSIS — I11.9 HYPERTENSIVE HEART DISEASE WITHOUT HEART FAILURE: ICD-10-CM

## 2020-09-16 DIAGNOSIS — L89.893 PRESSURE ULCER OF OTHER SITE, STAGE 3: ICD-10-CM

## 2020-09-16 DIAGNOSIS — Z84.0 FAMILY HISTORY OF DISEASES OF THE SKIN AND SUBCUTANEOUS TISSUE: ICD-10-CM
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
## 2020-09-16 DIAGNOSIS — K57.32 DIVERTICULITIS OF LARGE INTESTINE WITHOUT PERFORATION OR ABSCESS WITHOUT BLEEDING: ICD-10-CM

## 2020-09-16 DIAGNOSIS — Z82.49 FAMILY HISTORY OF ISCHEMIC HEART DISEASE AND OTHER DISEASES OF THE CIRCULATORY SYSTEM: ICD-10-CM

## 2020-09-16 DIAGNOSIS — M86.171 OTHER ACUTE OSTEOMYELITIS, RIGHT ANKLE AND FOOT: ICD-10-CM

## 2020-09-19 NOTE — HISTORY OF PRESENT ILLNESS
[FreeTextEntry1] : s/p resection of bone 1st metatarsal right foot , heeling well . Plantar ulcer is superficial , the dorsal dehiscence is smaller and clean and granular

## 2020-09-19 NOTE — PHYSICAL EXAM
[4 x 4] : 4 x 4  [Abdominal Pad] : Abdominal Pad [Normal Breath Sounds] : Normal breath sounds [1+] : left 1+ [No Rash or Lesion] : No rash or lesion [Skin Ulcer] : ulcer [Alert] : alert [Oriented to Person] : oriented to person [Oriented to Place] : oriented to place [Calm] : calm [Oriented to Time] : oriented to time [Purpura] : no purpura  [Petechiae] : no petechiae [de-identified] : comfortable  [de-identified] : HTN [de-identified] : chronic OM and acute right 1st metatarsal right foot , pt on PICC line [de-identified] :  1st metatarsal right foot , s/p met head resection  , healing well  [de-identified] : peripheral neuropathy  [de-identified] : DPM applied dressing**** [FreeTextEntry1] : Right Plantar 1st Met [FreeTextEntry2] : 1.1 [FreeTextEntry3] : 0.8 [FreeTextEntry4] : 0.2 [de-identified] : Serosanguineous [de-identified] : Callused - callus shaved by RILEY [de-identified] : Silver Alginate [de-identified] : Cleansed with Normal Saline\par  [de-identified] : DPM applied dressing**** [FreeTextEntry7] : Right Dorsal Foot 1st Met [FreeTextEntry8] : 2.4 [FreeTextEntry9] : 1.6 [de-identified] : 0.2 [de-identified] : Serosanguineous [de-identified] : Intact [de-identified] : 25% [de-identified] : Silver Alginate [de-identified] : Cleansed with Normal Saline\par  [TWNoteComboBox4] : Small [TWNoteComboBox5] : No [de-identified] : No [de-identified] : None [de-identified] : None [de-identified] : 100% [de-identified] : No [de-identified] : 3x Weekly [de-identified] : Small [de-identified] : No [de-identified] : No [de-identified] : None [de-identified] : None [de-identified] : >75% [de-identified] : Yes [de-identified] : 3x Weekly

## 2020-09-19 NOTE — ASSESSMENT
[Verbal] : Verbal [Patient] : Patient [Dressing changes] : dressing changes [Verbalizes knowledge/Understanding] : Verbalizes knowledge/understanding [Good - alert, interested, motivated] : Good - alert, interested, motivated [Foot Care] : foot care [Skin Care] : skin care [Pressure relief] : pressure relief [Signs and symptoms of infection] : sign and symptoms of infection [How and When to Call] : how and when to call [Off-loading] : off-loading [Patient responsibility to plan of care] : patient responsibility to plan of care [] : Yes [Home] : Home [Stable] : stable [Ambulatory] : Ambulatory [Not Applicable - Long Term Care/Home Health Agency] : Long Term Care/Home Health Agency: Not Applicable [FreeTextEntry2] : Restore Skin Integrity\par Infection Control\par Localized wound care\par Maintain acceptable pain levels\par Demonstrates use of both nonpharmacological and pharmacological pain relief strategies [FreeTextEntry4] : Photos Taken\par As per DPM pre-auth submitted for PuraPly next visit.\par F/U to [facility] in 1 week \par 1 unit 2x2 puraply available in unit med cabinet on 09/16/20

## 2020-09-19 NOTE — REVIEW OF SYSTEMS
[Arthralgias] : arthralgias [Joint Stiffness] : joint stiffness [Skin Wound] : skin wound [Negative] : Endocrine [Fever] : no fever [Chills] : no chills [Eye Pain] : no eye pain [Loss Of Hearing] : no hearing loss [Shortness Of Breath] : no shortness of breath [Wheezing] : no wheezing [Abdominal Pain] : no abdominal pain [Vomiting] : no vomiting [Anxiety] : no anxiety [Easy Bleeding] : no tendency for easy bleeding [Easy Bruising] : no tendency for easy bruising [FreeTextEntry5] : HTN [FreeTextEntry9] : Hammer toes and forefoot deformity  [de-identified] : Stage 3 pressure ulcer plantar 1st met right foot , skin, subcutaneous and fat associated with 1st met hypertrophic spurring  [de-identified] : peripheral neuropathy

## 2020-09-22 ENCOUNTER — OUTPATIENT (OUTPATIENT)
Dept: OUTPATIENT SERVICES | Facility: HOSPITAL | Age: 70
LOS: 1 days | Discharge: HOME | End: 2020-09-22
Payer: SELF-PAY

## 2020-09-22 ENCOUNTER — APPOINTMENT (OUTPATIENT)
Dept: WOUND CARE | Facility: HOSPITAL | Age: 70
End: 2020-09-22
Payer: MEDICARE

## 2020-09-22 VITALS
SYSTOLIC BLOOD PRESSURE: 117 MMHG | HEIGHT: 75 IN | BODY MASS INDEX: 29.22 KG/M2 | HEART RATE: 79 BPM | DIASTOLIC BLOOD PRESSURE: 68 MMHG | WEIGHT: 235 LBS | RESPIRATION RATE: 20 BRPM | TEMPERATURE: 97 F | OXYGEN SATURATION: 98 %

## 2020-09-22 DIAGNOSIS — L89.893 PRESSURE ULCER OF OTHER SITE, STAGE 3: ICD-10-CM

## 2020-09-22 DIAGNOSIS — Z79.899 OTHER LONG TERM (CURRENT) DRUG THERAPY: ICD-10-CM

## 2020-09-22 DIAGNOSIS — Z87.828 PERSONAL HISTORY OF OTHER (HEALED) PHYSICAL INJURY AND TRAUMA: ICD-10-CM

## 2020-09-22 DIAGNOSIS — G62.9 POLYNEUROPATHY, UNSPECIFIED: ICD-10-CM

## 2020-09-22 DIAGNOSIS — K57.32 DIVERTICULITIS OF LARGE INTESTINE WITHOUT PERFORATION OR ABSCESS WITHOUT BLEEDING: ICD-10-CM

## 2020-09-22 DIAGNOSIS — I11.9 HYPERTENSIVE HEART DISEASE WITHOUT HEART FAILURE: ICD-10-CM

## 2020-09-22 DIAGNOSIS — E11.621 TYPE 2 DIABETES MELLITUS WITH FOOT ULCER: ICD-10-CM

## 2020-09-22 DIAGNOSIS — Z84.0 FAMILY HISTORY OF DISEASES OF THE SKIN AND SUBCUTANEOUS TISSUE: ICD-10-CM

## 2020-09-22 DIAGNOSIS — I48.91 UNSPECIFIED ATRIAL FIBRILLATION: ICD-10-CM

## 2020-09-22 DIAGNOSIS — Z85.828 PERSONAL HISTORY OF OTHER MALIGNANT NEOPLASM OF SKIN: Chronic | ICD-10-CM

## 2020-09-22 DIAGNOSIS — Z82.49 FAMILY HISTORY OF ISCHEMIC HEART DISEASE AND OTHER DISEASES OF THE CIRCULATORY SYSTEM: ICD-10-CM

## 2020-09-22 DIAGNOSIS — Z85.820 PERSONAL HISTORY OF MALIGNANT MELANOMA OF SKIN: ICD-10-CM

## 2020-09-22 PROCEDURE — 15275 SKIN SUB GRAFT FACE/NK/HF/G: CPT | Mod: 79

## 2020-09-22 PROCEDURE — 15275 SKIN SUB GRAFT FACE/NK/HF/G: CPT

## 2020-09-23 ENCOUNTER — NON-APPOINTMENT (OUTPATIENT)
Age: 70
End: 2020-09-23

## 2020-09-23 ENCOUNTER — APPOINTMENT (OUTPATIENT)
Dept: CARDIOLOGY | Facility: CLINIC | Age: 70
End: 2020-09-23
Payer: MEDICARE

## 2020-09-23 VITALS — DIASTOLIC BLOOD PRESSURE: 72 MMHG | SYSTOLIC BLOOD PRESSURE: 123 MMHG

## 2020-09-23 VITALS
WEIGHT: 247 LBS | DIASTOLIC BLOOD PRESSURE: 75 MMHG | OXYGEN SATURATION: 99 % | SYSTOLIC BLOOD PRESSURE: 143 MMHG | BODY MASS INDEX: 30.71 KG/M2 | HEIGHT: 75 IN | HEART RATE: 67 BPM

## 2020-09-23 PROCEDURE — 93000 ELECTROCARDIOGRAM COMPLETE: CPT

## 2020-09-23 PROCEDURE — 99214 OFFICE O/P EST MOD 30 MIN: CPT

## 2020-09-23 RX ORDER — ATENOLOL 100 MG/1
100 TABLET ORAL DAILY
Refills: 0 | Status: DISCONTINUED | COMMUNITY
End: 2020-09-23

## 2020-09-23 RX ORDER — AMLODIPINE BESYLATE 5 MG/1
5 TABLET ORAL
Qty: 30 | Refills: 0 | Status: DISCONTINUED | COMMUNITY
Start: 2020-08-21 | End: 2020-09-23

## 2020-09-23 NOTE — REASON FOR VISIT
[Follow-Up - From Hospitalization] : follow-up of a recent hospitalization for [FreeTextEntry1] : leg ulcer

## 2020-09-23 NOTE — HISTORY OF PRESENT ILLNESS
[FreeTextEntry1] : Celestine is a 69-year-old male here for post hospitalization followup.\par His past medical history is notable for neuropathy/back surgery, a right foot ulcer, melanoma status post resection, and hypertension.\par He is now status post amputation of his first metatarsal head. An echocardiogram last month was unremarkable. He was found to have OM, and is currently on IV abx through a PICC line.\par \par He has a history of atrial fibrillation, status post cardioversion in the past in 2019. After a resection of a melanoma, in the recovery room he was noted to be in asymptomatic AF.  He was anticoagulated, rate controlled.  Echo unremarkable.  Noted to have brief wct and so had a nuclear stress, which revealed a small area of inferolateral ischemia, which was decided to be managed conservatively.  YANICK DCCV was performed, and he was discharged in SR.\par \par He is feeling well today. He has no chest pain, dyspnea, or palpitations. He has no symptoms to suggest fast heart rates. He also denies dizziness, lightheadedness, or near syncope.\par

## 2020-09-23 NOTE — DISCUSSION/SUMMARY
[___ Month(s)] : [unfilled] month(s) [FreeTextEntry1] : Celestine was recently admitted with osteomyelitis of his right foot, and found to be in rate-controlled atrial fibrillation.\par He has no worrisome symptoms today. His blood pressure is at goal. His physical exam is unremarkable. He remained in rate-controlled atrial fibrillation. His LV function is normal.\par \par He will remain on Toprol-XL, along with anticoagulation. He is to stay active, and eat right. I will see him in 3 months, after his infection has cleared, to evaluate any further plans for his atrial fibrillation.

## 2020-09-23 NOTE — PHYSICAL EXAM
[General Appearance - Well Developed] : well developed [Normal Appearance] : normal appearance [Well Groomed] : well groomed [General Appearance - Well Nourished] : well nourished [No Deformities] : no deformities [General Appearance - In No Acute Distress] : no acute distress [Normal Conjunctiva] : the conjunctiva exhibited no abnormalities [Eyelids - No Xanthelasma] : the eyelids demonstrated no xanthelasmas [Normal Oral Mucosa] : normal oral mucosa [No Oral Pallor] : no oral pallor [No Oral Cyanosis] : no oral cyanosis [Normal Jugular Venous A Waves Present] : normal jugular venous A waves present [Normal Jugular Venous V Waves Present] : normal jugular venous V waves present [No Jugular Venous Wilson A Waves] : no jugular venous wilson A waves [Respiration, Rhythm And Depth] : normal respiratory rhythm and effort [Exaggerated Use Of Accessory Muscles For Inspiration] : no accessory muscle use [Auscultation Breath Sounds / Voice Sounds] : lungs were clear to auscultation bilaterally [Normal Rate] : normal [Normal S1] : normal S1 [Normal S2] : normal S2 [S3] : no S3 [S4] : no S4 [No Murmur] : no murmurs heard [Right Carotid Bruit] : no bruit heard over the right carotid [Left Carotid Bruit] : no bruit heard over the left carotid [Right Femoral Bruit] : no bruit heard over the right femoral artery [Left Femoral Bruit] : no bruit heard over the left femoral artery [2+] : left 2+ [No Abnormalities] : the abdominal aorta was not enlarged and no bruit was heard [___ +] : [unfilled]U+ pitting edema to the right ankle [Abdomen Soft] : soft [Abdomen Tenderness] : non-tender [Abdomen Mass (___ Cm)] : no abdominal mass palpated [Abnormal Walk] : normal gait [Gait - Sufficient For Exercise Testing] : the gait was sufficient for exercise testing [FreeTextEntry1] : right foot in boot [Nail Clubbing] : no clubbing of the fingernails [Cyanosis, Localized] : no localized cyanosis [Petechial Hemorrhages (___cm)] : no petechial hemorrhages [Skin Color & Pigmentation] : normal skin color and pigmentation [] : no rash [No Venous Stasis] : no venous stasis [Skin Lesions] : no skin lesions [No Skin Ulcers] : no skin ulcer [No Xanthoma] : no  xanthoma was observed [Oriented To Time, Place, And Person] : oriented to person, place, and time [Affect] : the affect was normal [Mood] : the mood was normal [No Anxiety] : not feeling anxious

## 2020-09-24 NOTE — ASSESSMENT
[Verbal] : Verbal [Patient] : Patient [Good - alert, interested, motivated] : Good - alert, interested, motivated [Verbalizes knowledge/Understanding] : Verbalizes knowledge/understanding [Dressing changes] : dressing changes [Foot Care] : foot care [Skin Care] : skin care [Signs and symptoms of infection] : sign and symptoms of infection [How and When to Call] : how and when to call [Off-loading] : off-loading [Patient responsibility to plan of care] : patient responsibility to plan of care [Stable] : stable [Home] : Home [Ambulatory] : Ambulatory [Not Applicable - Long Term Care/Home Health Agency] : Long Term Care/Home Health Agency: Not Applicable [Other: ____] : [unfilled] [] : No [FreeTextEntry2] : Promote optimal skin integrity, offloading, infection prevention\par  [FreeTextEntry4] : 1 unit of Puraply, 2 cm x 2 cm,  Lot #[id number] , Exp. 9/9/22, reconstituted with NS, Lot #[id number], Exp. 2/1/23, applied to right dorsal foot 1st metatarsal wound, 100% implanted at 1410.  0% discarded.\par 1 unit of Puraply, 2 cm x 2 cm, ordered for next week.  Submitted for authorization.\par F/U 1 week\par 1 2x2 puraply ordered on 09/23/20\par f/u 1 week\par

## 2020-09-24 NOTE — PHYSICAL EXAM
[4 x 4] : 4 x 4  [Abdominal Pad] : Abdominal Pad [Normal Breath Sounds] : Normal breath sounds [1+] : left 1+ [No Rash or Lesion] : No rash or lesion [Skin Ulcer] : ulcer [Alert] : alert [Oriented to Person] : oriented to person [Oriented to Place] : oriented to place [Oriented to Time] : oriented to time [Calm] : calm [Purpura] : no purpura  [Petechiae] : no petechiae [de-identified] : comfortable  [de-identified] : HTN [de-identified] : chronic OM and acute right 1st metatarsal right foot , pt on PICC line [de-identified] :  1st metatarsal right foot , s/p met head resection  , healing well  [de-identified] : peripheral neuropathy  [FreeTextEntry1] : Right plantar 1st metatarsal [FreeTextEntry2] : 0.8 [FreeTextEntry3] : 0.8 [FreeTextEntry4] : 0.2 [de-identified] : small serosanguineous [de-identified] : callus [de-identified] : none [de-identified] : 100% [de-identified] : Wound veil, calcium alginate [de-identified] : NSC [de-identified] : Ander implanted @ 0480.  Patient tolerated procedure well.  0/10 pain.  Dressing applied by DPM [FreeTextEntry7] : Right dorsal foot 1st metatarsal [FreeTextEntry8] : 2.5 [FreeTextEntry9] : 1.6 [de-identified] : 0.1 [de-identified] : small serosanguineous [de-identified] : intact [de-identified] : none [de-identified] : 100% [de-identified] : none [FreeTextEntry6] : post debridement measurement: 2.6 cm x 1.7 cm  x 0.2 cm [de-identified] : Puraply [de-identified] : PuraPly, Wound veil, calcium aginate [de-identified] : NSC [de-identified] : False [de-identified] : Weekly [TWNoteComboBox8] : Simba [de-identified] : Application of skin substitute [de-identified] : Weekly

## 2020-09-24 NOTE — PROCEDURE
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

## 2020-09-24 NOTE — VITALS
[] : No [de-identified] : Pain scale:  0/10 - Patient reports no c/o pains or discomforts at present.

## 2020-09-24 NOTE — REVIEW OF SYSTEMS
[Arthralgias] : arthralgias [Joint Stiffness] : joint stiffness [Skin Wound] : skin wound [Negative] : Endocrine [FreeTextEntry1] : 8435 [Fever] : no fever [Chills] : no chills [Eye Pain] : no eye pain [Loss Of Hearing] : no hearing loss [Shortness Of Breath] : no shortness of breath [Wheezing] : no wheezing [Abdominal Pain] : no abdominal pain [Vomiting] : no vomiting [Anxiety] : no anxiety [Easy Bleeding] : no tendency for easy bleeding [Easy Bruising] : no tendency for easy bruising [FreeTextEntry5] : HTN [FreeTextEntry9] : Hammer toes and forefoot deformity  [de-identified] : Stage 3 pressure ulcer plantar 1st met right foot , skin, subcutaneous and fat associated with 1st met hypertrophic spurring  [de-identified] : peripheral neuropathy

## 2020-09-29 ENCOUNTER — OUTPATIENT (OUTPATIENT)
Dept: OUTPATIENT SERVICES | Facility: HOSPITAL | Age: 70
LOS: 1 days | Discharge: HOME | End: 2020-09-29
Payer: SELF-PAY

## 2020-09-29 ENCOUNTER — APPOINTMENT (OUTPATIENT)
Dept: PODIATRY | Facility: HOSPITAL | Age: 70
End: 2020-09-29
Payer: MEDICARE

## 2020-09-29 VITALS
BODY MASS INDEX: 30.71 KG/M2 | HEIGHT: 75 IN | DIASTOLIC BLOOD PRESSURE: 81 MMHG | SYSTOLIC BLOOD PRESSURE: 128 MMHG | OXYGEN SATURATION: 99 % | RESPIRATION RATE: 20 BRPM | WEIGHT: 247 LBS | HEART RATE: 70 BPM | TEMPERATURE: 96.3 F

## 2020-09-29 VITALS
OXYGEN SATURATION: 99 % | HEART RATE: 70 BPM | SYSTOLIC BLOOD PRESSURE: 128 MMHG | HEIGHT: 75 IN | WEIGHT: 247 LBS | BODY MASS INDEX: 30.71 KG/M2 | DIASTOLIC BLOOD PRESSURE: 81 MMHG | TEMPERATURE: 96.38 F | RESPIRATION RATE: 20 BRPM

## 2020-09-29 DIAGNOSIS — Z82.49 FAMILY HISTORY OF ISCHEMIC HEART DISEASE AND OTHER DISEASES OF THE CIRCULATORY SYSTEM: ICD-10-CM

## 2020-09-29 DIAGNOSIS — L89.893 PRESSURE ULCER OF OTHER SITE, STAGE 3: ICD-10-CM

## 2020-09-29 DIAGNOSIS — Z84.0 FAMILY HISTORY OF DISEASES OF THE SKIN AND SUBCUTANEOUS TISSUE: ICD-10-CM

## 2020-09-29 DIAGNOSIS — Z85.828 PERSONAL HISTORY OF OTHER MALIGNANT NEOPLASM OF SKIN: Chronic | ICD-10-CM

## 2020-09-29 DIAGNOSIS — G62.9 POLYNEUROPATHY, UNSPECIFIED: ICD-10-CM

## 2020-09-29 DIAGNOSIS — K57.32 DIVERTICULITIS OF LARGE INTESTINE WITHOUT PERFORATION OR ABSCESS WITHOUT BLEEDING: ICD-10-CM

## 2020-09-29 DIAGNOSIS — Z79.899 OTHER LONG TERM (CURRENT) DRUG THERAPY: ICD-10-CM

## 2020-09-29 DIAGNOSIS — Z87.828 PERSONAL HISTORY OF OTHER (HEALED) PHYSICAL INJURY AND TRAUMA: ICD-10-CM

## 2020-09-29 DIAGNOSIS — E11.621 TYPE 2 DIABETES MELLITUS WITH FOOT ULCER: ICD-10-CM

## 2020-09-29 DIAGNOSIS — I11.9 HYPERTENSIVE HEART DISEASE WITHOUT HEART FAILURE: ICD-10-CM

## 2020-09-29 DIAGNOSIS — I48.91 UNSPECIFIED ATRIAL FIBRILLATION: ICD-10-CM

## 2020-09-29 DIAGNOSIS — Z85.820 PERSONAL HISTORY OF MALIGNANT MELANOMA OF SKIN: ICD-10-CM

## 2020-09-29 PROCEDURE — 15275 SKIN SUB GRAFT FACE/NK/HF/G: CPT

## 2020-09-29 PROCEDURE — 15275 SKIN SUB GRAFT FACE/NK/HF/G: CPT | Mod: 58

## 2020-10-01 NOTE — REASON FOR VISIT
[Other: _____] : [unfilled] [Puraply] : puraply [FreeTextEntry4] : dorsal ulcer right foot s/p resection bone 1st metatarsal

## 2020-10-01 NOTE — ASSESSMENT
[Verbal] : Verbal [Patient] : Patient [Good - alert, interested, motivated] : Good - alert, interested, motivated [Verbalizes knowledge/Understanding] : Verbalizes knowledge/understanding [Dressing changes] : dressing changes [Foot Care] : foot care [Skin Care] : skin care [Signs and symptoms of infection] : sign and symptoms of infection [How and When to Call] : how and when to call [Off-loading] : off-loading [Patient responsibility to plan of care] : patient responsibility to plan of care [Other: ____] : [unfilled] [Stable] : stable [Home] : Home [Ambulatory] : Ambulatory [Not Applicable - Long Term Care/Home Health Agency] : Long Term Care/Home Health Agency: Not Applicable [] : No [FreeTextEntry2] : Promote optimal skin integrity, offloading, infection prevention\par  [FreeTextEntry4] : Patient tolerated procedure well.\par Submitted for authorization for Puraply application #3 next week.\par F/U 1 week\par 1- 2x2 Puraply ordered on 09/30/20 for pts next assessment.

## 2020-10-01 NOTE — REVIEW OF SYSTEMS
[Arthralgias] : arthralgias [Joint Stiffness] : joint stiffness [Skin Wound] : skin wound [Negative] : Endocrine [FreeTextEntry1] : 103 [Fever] : no fever [Chills] : no chills [Eye Pain] : no eye pain [Loss Of Hearing] : no hearing loss [Shortness Of Breath] : no shortness of breath [Wheezing] : no wheezing [Abdominal Pain] : no abdominal pain [Vomiting] : no vomiting [Anxiety] : no anxiety [Easy Bleeding] : no tendency for easy bleeding [Easy Bruising] : no tendency for easy bruising [FreeTextEntry5] : HTN [FreeTextEntry9] : Hammer toes and forefoot deformity  [de-identified] : Stage 3 pressure ulcer plantar 1st met right foot , s/p bone resection , healing , puraply application   [de-identified] : peripheral neuropathy

## 2020-10-01 NOTE — PROCEDURE
[Saline] : saline [Scalpel] : scalpel [Sharp curette] : sharp curette [Other: ___] : [unfilled] [Hydrated with saline] : hydrated with saline [Puraply] : puraply [____ % was used] : and [unfilled] % was used [FreeTextEntry1] : wound veil / alginate  [] : No [FreeTextEntry9] : 7440 [de-identified] : Clean , granular ulcer dorsal right foot  [de-identified] : Thor [FreeTextEntry6] : Dorsal ulcer right foot  [FreeTextEntry7] : same  [de-identified] : 0 [de-identified] : 5cc [de-identified] : slough , fibrin

## 2020-10-01 NOTE — PHYSICAL EXAM
[4 x 4] : 4 x 4  [Abdominal Pad] : Abdominal Pad [Normal Breath Sounds] : Normal breath sounds [1+] : left 1+ [No Rash or Lesion] : No rash or lesion [Skin Ulcer] : ulcer [Alert] : alert [Oriented to Person] : oriented to person [Oriented to Place] : oriented to place [Oriented to Time] : oriented to time [Calm] : calm [Purpura] : no purpura  [Petechiae] : no petechiae [de-identified] : comfortable  [de-identified] : HTN [de-identified] : chronic OM and acute right 1st metatarsal right foot ,  [de-identified] :  1st metatarsal right foot , s/p met head resection  , granular , puraply application  [de-identified] : peripheral neuropathy  [de-identified] : DPM shaved callus. Dressing applied by DPM** [FreeTextEntry1] : Right plantar 1st metatarsal [FreeTextEntry2] : 0.6 [FreeTextEntry3] : 0.5 [FreeTextEntry4] : 0.1 [de-identified] : small serosanguineous [de-identified] : callus [de-identified] : none [de-identified] : 100% [de-identified] : Wound veil, calcium alginate [de-identified] : NSC\par  [de-identified] : Ander implanted @ 8912.  Patient tolerated procedure well. Patient reports 0/10 pain. Post debridement measurement: 2.5 x 1.5 x 0.1. Dressing applied by DPM** [FreeTextEntry7] : Right dorsal foot 1st metatarsal [FreeTextEntry8] : 2.3 [FreeTextEntry9] : 1.4 [de-identified] : 0.1 [de-identified] : small serosanguineous [de-identified] : intact [de-identified] : none [de-identified] : 100% [de-identified] : none [de-identified] : Puraply [de-identified] : PuraPly, Wound veil, calcium aginate [de-identified] : NSC\par \par Post debridement measurement: 2.5 x 1.5 x 0.1\par \par 1 unit of Puraply, 2 cm x 2 cm\par Lot #[id number]\par Exp. 1/1/23\par \par Reconstituted with NS\par Lot #[id number]\par Exp. 2/1/23\par \par 100% implanted at 1041. 0% discarded. [de-identified] : Weekly [TWNoteComboBox8] : Simba [de-identified] : Application of skin substitute [de-identified] : Weekly

## 2020-10-06 ENCOUNTER — OUTPATIENT (OUTPATIENT)
Dept: OUTPATIENT SERVICES | Facility: HOSPITAL | Age: 70
LOS: 1 days | Discharge: HOME | End: 2020-10-06
Payer: SELF-PAY

## 2020-10-06 ENCOUNTER — APPOINTMENT (OUTPATIENT)
Dept: PODIATRY | Facility: HOSPITAL | Age: 70
End: 2020-10-06
Payer: MEDICARE

## 2020-10-06 VITALS
OXYGEN SATURATION: 99 % | TEMPERATURE: 97 F | DIASTOLIC BLOOD PRESSURE: 92 MMHG | BODY MASS INDEX: 30.71 KG/M2 | SYSTOLIC BLOOD PRESSURE: 136 MMHG | WEIGHT: 247 LBS | RESPIRATION RATE: 20 BRPM | HEIGHT: 75 IN | HEART RATE: 72 BPM

## 2020-10-06 DIAGNOSIS — G62.9 POLYNEUROPATHY, UNSPECIFIED: ICD-10-CM

## 2020-10-06 DIAGNOSIS — K57.32 DIVERTICULITIS OF LARGE INTESTINE WITHOUT PERFORATION OR ABSCESS WITHOUT BLEEDING: ICD-10-CM

## 2020-10-06 DIAGNOSIS — Z84.0 FAMILY HISTORY OF DISEASES OF THE SKIN AND SUBCUTANEOUS TISSUE: ICD-10-CM

## 2020-10-06 DIAGNOSIS — Z82.49 FAMILY HISTORY OF ISCHEMIC HEART DISEASE AND OTHER DISEASES OF THE CIRCULATORY SYSTEM: ICD-10-CM

## 2020-10-06 DIAGNOSIS — Z87.828 PERSONAL HISTORY OF OTHER (HEALED) PHYSICAL INJURY AND TRAUMA: ICD-10-CM

## 2020-10-06 DIAGNOSIS — Z85.820 PERSONAL HISTORY OF MALIGNANT MELANOMA OF SKIN: ICD-10-CM

## 2020-10-06 DIAGNOSIS — I11.9 HYPERTENSIVE HEART DISEASE WITHOUT HEART FAILURE: ICD-10-CM

## 2020-10-06 DIAGNOSIS — I48.91 UNSPECIFIED ATRIAL FIBRILLATION: ICD-10-CM

## 2020-10-06 DIAGNOSIS — Z79.899 OTHER LONG TERM (CURRENT) DRUG THERAPY: ICD-10-CM

## 2020-10-06 DIAGNOSIS — L89.893 PRESSURE ULCER OF OTHER SITE, STAGE 3: ICD-10-CM

## 2020-10-06 DIAGNOSIS — Z85.828 PERSONAL HISTORY OF OTHER MALIGNANT NEOPLASM OF SKIN: Chronic | ICD-10-CM

## 2020-10-06 DIAGNOSIS — E11.621 TYPE 2 DIABETES MELLITUS WITH FOOT ULCER: ICD-10-CM

## 2020-10-06 PROCEDURE — 15275 SKIN SUB GRAFT FACE/NK/HF/G: CPT | Mod: 58

## 2020-10-06 PROCEDURE — 15275 SKIN SUB GRAFT FACE/NK/HF/G: CPT

## 2020-10-13 ENCOUNTER — OUTPATIENT (OUTPATIENT)
Dept: OUTPATIENT SERVICES | Facility: HOSPITAL | Age: 70
LOS: 1 days | Discharge: HOME | End: 2020-10-13
Payer: COMMERCIAL

## 2020-10-13 ENCOUNTER — APPOINTMENT (OUTPATIENT)
Dept: WOUND CARE | Facility: HOSPITAL | Age: 70
End: 2020-10-13
Payer: MEDICARE

## 2020-10-13 VITALS
WEIGHT: 247 LBS | RESPIRATION RATE: 20 BRPM | OXYGEN SATURATION: 97 % | SYSTOLIC BLOOD PRESSURE: 146 MMHG | HEART RATE: 99 BPM | TEMPERATURE: 97.2 F | DIASTOLIC BLOOD PRESSURE: 85 MMHG | HEIGHT: 75 IN | BODY MASS INDEX: 30.71 KG/M2

## 2020-10-13 DIAGNOSIS — I48.91 UNSPECIFIED ATRIAL FIBRILLATION: ICD-10-CM

## 2020-10-13 DIAGNOSIS — K57.32 DIVERTICULITIS OF LARGE INTESTINE WITHOUT PERFORATION OR ABSCESS WITHOUT BLEEDING: ICD-10-CM

## 2020-10-13 DIAGNOSIS — Z85.820 PERSONAL HISTORY OF MALIGNANT MELANOMA OF SKIN: ICD-10-CM

## 2020-10-13 DIAGNOSIS — G62.9 POLYNEUROPATHY, UNSPECIFIED: ICD-10-CM

## 2020-10-13 DIAGNOSIS — E11.621 TYPE 2 DIABETES MELLITUS WITH FOOT ULCER: ICD-10-CM

## 2020-10-13 DIAGNOSIS — Z84.0 FAMILY HISTORY OF DISEASES OF THE SKIN AND SUBCUTANEOUS TISSUE: ICD-10-CM

## 2020-10-13 DIAGNOSIS — L89.893 PRESSURE ULCER OF OTHER SITE, STAGE 3: ICD-10-CM

## 2020-10-13 DIAGNOSIS — Z79.899 OTHER LONG TERM (CURRENT) DRUG THERAPY: ICD-10-CM

## 2020-10-13 DIAGNOSIS — I11.9 HYPERTENSIVE HEART DISEASE WITHOUT HEART FAILURE: ICD-10-CM

## 2020-10-13 DIAGNOSIS — Z82.49 FAMILY HISTORY OF ISCHEMIC HEART DISEASE AND OTHER DISEASES OF THE CIRCULATORY SYSTEM: ICD-10-CM

## 2020-10-13 DIAGNOSIS — Z87.828 PERSONAL HISTORY OF OTHER (HEALED) PHYSICAL INJURY AND TRAUMA: ICD-10-CM

## 2020-10-13 DIAGNOSIS — Z85.828 PERSONAL HISTORY OF OTHER MALIGNANT NEOPLASM OF SKIN: Chronic | ICD-10-CM

## 2020-10-13 PROCEDURE — 15275 SKIN SUB GRAFT FACE/NK/HF/G: CPT | Mod: 58

## 2020-10-13 PROCEDURE — 15275 SKIN SUB GRAFT FACE/NK/HF/G: CPT

## 2020-10-16 NOTE — PHYSICAL EXAM
[4 x 4] : 4 x 4  [Normal Breath Sounds] : Normal breath sounds [1+] : left 1+ [No Rash or Lesion] : No rash or lesion [Skin Ulcer] : ulcer [Alert] : alert [Oriented to Person] : oriented to person [Oriented to Place] : oriented to place [Oriented to Time] : oriented to time [Calm] : calm [Purpura] : no purpura  [Petechiae] : no petechiae [de-identified] : comfortable  [de-identified] : HTN [de-identified] :  1st metatarsal right foot , s/p met head resection  , granular , puraply application  [de-identified] : chronic OM and acute right 1st metatarsal right foot ,  [de-identified] : peripheral neuropathy  [de-identified] : Callus [FreeTextEntry1] : Right Plantar 1st Met-callus/Dry Eschar [de-identified] : Cleansed with Normal Saline\par  [de-identified] : Small amount of Blood Loss, No Pain During or Post Procedure, Pt tolerated Well.\par  [de-identified] : No Product [FreeTextEntry7] : Right Dorsal Foot 1st Met [FreeTextEntry8] : 1.6 [FreeTextEntry9] : 1.0 [de-identified] : 0.1 [de-identified] : Serosanguineous [de-identified] : Intact [de-identified] : Post Debridement Measurements 1.7 x 1.1 x 0.2 [de-identified] : Cleansed with Normal Saline\par \par Puraply 1.6cm Disc\par Lot: [id number]\par Exp: 06/22/2022\par Reconstituted in NS\par [id number]\par Exp: 03/01/2023\par Applied to Right Dorsal Foot\par 100% Used 0% Discarded [de-identified] : Puraply, Wound Veil, Calcium Alginate [TWNoteComboBox4] : None [de-identified] : No [TWNoteComboBox5] : No [de-identified] : Small [de-identified] : No [de-identified] : No [de-identified] : None [de-identified] : None [de-identified] : 100% [de-identified] : No [TWNoteComboBox8] : Simba [de-identified] : Debridement performed of all devitalized tissue to bleeding viable tissue [de-identified] : Weekly

## 2020-10-16 NOTE — REVIEW OF SYSTEMS
[Arthralgias] : arthralgias [Joint Stiffness] : joint stiffness [Skin Wound] : skin wound [Negative] : Endocrine [FreeTextEntry1] : 7835 [Fever] : no fever [Chills] : no chills [Eye Pain] : no eye pain [Shortness Of Breath] : no shortness of breath [Loss Of Hearing] : no hearing loss [Wheezing] : no wheezing [Abdominal Pain] : no abdominal pain [Vomiting] : no vomiting [Anxiety] : no anxiety [Easy Bleeding] : no tendency for easy bleeding [Easy Bruising] : no tendency for easy bruising [FreeTextEntry5] : HTN [FreeTextEntry9] : Hammer toes and forefoot deformity  [de-identified] : peripheral neuropathy  [de-identified] : Stage 3 pressure ulcer plantar 1st met right foot , s/p bone resection , healing , puraply application

## 2020-10-16 NOTE — ASSESSMENT
[Verbal] : Verbal [Patient] : Patient [Good - alert, interested, motivated] : Good - alert, interested, motivated [Verbalizes knowledge/Understanding] : Verbalizes knowledge/understanding [Dressing changes] : dressing changes [Foot Care] : foot care [Skin Care] : skin care [Signs and symptoms of infection] : sign and symptoms of infection [How and When to Call] : how and when to call [Patient responsibility to plan of care] : patient responsibility to plan of care [] : Yes [Stable] : stable [Home] : Home [Ambulatory] : Ambulatory [Not Applicable - Long Term Care/Home Health Agency] : Long Term Care/Home Health Agency: Not Applicable [FreeTextEntry2] : Restore Skin Integrity\par Infection Control\par Localized wound care\par Maintain acceptable pain levels at satisfactory relief.\par Demonstrates use of both nonpharmacological and pharmacological pain relief strategies [FreeTextEntry4] : Photos Taken\par MD feels pt may benefit from additional PuraPly, Auth Submitted\par F/U to [facility] in 1 week\par 1 UNIT PURAPLY ORDERED ON 10/16/20 FOR PTS NEXT ASSESSMENT\par

## 2020-10-16 NOTE — PROCEDURE
[Saline] : saline [Sharp curette] : sharp curette [Skin] : skin [Subcutaneous Tissue] : subcutaneous tissue [Hydrated with saline] : hydrated with saline [Puraply] : puraply [____ % was used] : and [unfilled] % was used [____ % was discarded] : and [unfilled] % was discarded [FreeTextEntry1] : wound veil, calcium alginate, dry dressing [] : No [FreeTextEntry9] : 4737 [de-identified] : Clean , granular ulcer dorsal right foot  [de-identified] : [place] [de-identified] : Abby [place] [FreeTextEntry6] : Dorsal ulcer right foot  [FreeTextEntry7] : same  [de-identified] : 0 [de-identified] : 5cc [de-identified] : slough , fibrin

## 2020-10-20 ENCOUNTER — APPOINTMENT (OUTPATIENT)
Dept: PODIATRY | Facility: HOSPITAL | Age: 70
End: 2020-10-20

## 2020-10-20 ENCOUNTER — APPOINTMENT (OUTPATIENT)
Dept: WOUND CARE | Facility: HOSPITAL | Age: 70
End: 2020-10-20
Payer: MEDICARE

## 2020-10-20 ENCOUNTER — OUTPATIENT (OUTPATIENT)
Dept: OUTPATIENT SERVICES | Facility: HOSPITAL | Age: 70
LOS: 1 days | Discharge: HOME | End: 2020-10-20
Payer: COMMERCIAL

## 2020-10-20 VITALS
SYSTOLIC BLOOD PRESSURE: 118 MMHG | HEIGHT: 75 IN | BODY MASS INDEX: 30.71 KG/M2 | OXYGEN SATURATION: 99 % | RESPIRATION RATE: 20 BRPM | WEIGHT: 247 LBS | DIASTOLIC BLOOD PRESSURE: 77 MMHG | HEART RATE: 82 BPM | TEMPERATURE: 97.8 F

## 2020-10-20 DIAGNOSIS — E11.621 TYPE 2 DIABETES MELLITUS WITH FOOT ULCER: ICD-10-CM

## 2020-10-20 DIAGNOSIS — Z85.828 PERSONAL HISTORY OF OTHER MALIGNANT NEOPLASM OF SKIN: Chronic | ICD-10-CM

## 2020-10-20 PROCEDURE — 99024 POSTOP FOLLOW-UP VISIT: CPT

## 2020-10-20 PROCEDURE — G0463: CPT

## 2020-10-20 NOTE — PHYSICAL EXAM
[4 x 4] : 4 x 4  [Normal Breath Sounds] : Normal breath sounds [1+] : left 1+ [No Rash or Lesion] : No rash or lesion [Petechiae] : no petechiae [Skin Ulcer] : ulcer [Purpura] : no purpura  [Oriented to Person] : oriented to person [Alert] : alert [Oriented to Place] : oriented to place [Oriented to Time] : oriented to time [Calm] : calm [de-identified] : comfortable  [de-identified] : chronic OM and acute right 1st metatarsal right foot ,  [de-identified] : HTN [de-identified] : peripheral neuropathy  [de-identified] :  1st metatarsal right foot , s/p met head resection  , granular [de-identified] : Cleansed with Normal Saline\par  [de-identified] : No Product [FreeTextEntry1] : Right Plantar 1st Met-callus/Dry Eschar [de-identified] : Callus [FreeTextEntry8] : 0.7 [FreeTextEntry7] : Right Dorsal Foot 1st Met [de-identified] : \par  [FreeTextEntry9] : 0.5 [de-identified] : 0.1- 0.3 [de-identified] : Silver Alginate  [de-identified] : Intact [de-identified] : Serosanguineous [TWNoteComboBox4] : None [de-identified] : Cleansed with Normal Saline\par \par  [TWNoteComboBox5] : No [de-identified] : Small [de-identified] : No [de-identified] : Surgical [de-identified] : No [de-identified] : None [de-identified] : No [de-identified] : None [de-identified] : 100% [de-identified] : No [de-identified] : 3x Weekly [TWNoteComboBox8] : Simba [de-identified] : Debridement performed of all devitalized tissue to bleeding viable tissue

## 2020-10-20 NOTE — PLAN
[FreeTextEntry1] : Patient examined and evaluated at this time.\par Continue local wound care and offloading.\par Pt to follow up in 1 week.

## 2020-10-20 NOTE — HISTORY OF PRESENT ILLNESS
[FreeTextEntry1] : s/p resection of bone 1st metatarsal right foot , heeling well . Plantar ulcer is epithelialized, the dorsal dehiscence is smaller and clean and granular

## 2020-10-20 NOTE — ASSESSMENT
[Verbal] : Verbal [Patient] : Patient [Good - alert, interested, motivated] : Good - alert, interested, motivated [Dressing changes] : dressing changes [Verbalizes knowledge/Understanding] : Verbalizes knowledge/understanding [Foot Care] : foot care [Skin Care] : skin care [Signs and symptoms of infection] : sign and symptoms of infection [How and When to Call] : how and when to call [Patient responsibility to plan of care] : patient responsibility to plan of care [Stable] : stable [Home] : Home [Ambulatory] : Ambulatory [Not Applicable - Long Term Care/Home Health Agency] : Long Term Care/Home Health Agency: Not Applicable [] : No [FreeTextEntry4] : Wound is too small for a Humera-ply\par Pt to F/U to [facility] in 1 Week  [FreeTextEntry2] : Restore Optimal Skin Integrity \par Infection Control\par Localized wound care\par Maintain acceptable pain levels at satisfactory relief.\par Demonstrates use of both nonpharmacological and pharmacological pain relief strategies

## 2020-10-20 NOTE — REVIEW OF SYSTEMS
[Fever] : no fever [Chills] : no chills [Eye Pain] : no eye pain [Loss Of Hearing] : no hearing loss [Shortness Of Breath] : no shortness of breath [Abdominal Pain] : no abdominal pain [Wheezing] : no wheezing [Arthralgias] : arthralgias [Joint Stiffness] : joint stiffness [Vomiting] : no vomiting [Skin Wound] : skin wound [Anxiety] : no anxiety [Easy Bleeding] : no tendency for easy bleeding [Easy Bruising] : no tendency for easy bruising [Negative] : Endocrine [de-identified] : Stage 3 pressure ulcer plantar 1st met right foot , s/p bone resection , healing [FreeTextEntry5] : HTN [FreeTextEntry9] : Hammer toes and forefoot deformity  [de-identified] : peripheral neuropathy

## 2020-10-21 DIAGNOSIS — Z79.899 OTHER LONG TERM (CURRENT) DRUG THERAPY: ICD-10-CM

## 2020-10-21 DIAGNOSIS — M86.671 OTHER CHRONIC OSTEOMYELITIS, RIGHT ANKLE AND FOOT: ICD-10-CM

## 2020-10-21 DIAGNOSIS — Z84.0 FAMILY HISTORY OF DISEASES OF THE SKIN AND SUBCUTANEOUS TISSUE: ICD-10-CM

## 2020-10-21 DIAGNOSIS — G62.9 POLYNEUROPATHY, UNSPECIFIED: ICD-10-CM

## 2020-10-21 DIAGNOSIS — M86.171 OTHER ACUTE OSTEOMYELITIS, RIGHT ANKLE AND FOOT: ICD-10-CM

## 2020-10-21 DIAGNOSIS — K57.32 DIVERTICULITIS OF LARGE INTESTINE WITHOUT PERFORATION OR ABSCESS WITHOUT BLEEDING: ICD-10-CM

## 2020-10-21 DIAGNOSIS — Z82.49 FAMILY HISTORY OF ISCHEMIC HEART DISEASE AND OTHER DISEASES OF THE CIRCULATORY SYSTEM: ICD-10-CM

## 2020-10-21 DIAGNOSIS — Z87.828 PERSONAL HISTORY OF OTHER (HEALED) PHYSICAL INJURY AND TRAUMA: ICD-10-CM

## 2020-10-21 DIAGNOSIS — L89.893 PRESSURE ULCER OF OTHER SITE, STAGE 3: ICD-10-CM

## 2020-10-21 DIAGNOSIS — I11.9 HYPERTENSIVE HEART DISEASE WITHOUT HEART FAILURE: ICD-10-CM

## 2020-10-21 DIAGNOSIS — I48.91 UNSPECIFIED ATRIAL FIBRILLATION: ICD-10-CM

## 2020-10-21 DIAGNOSIS — Z85.820 PERSONAL HISTORY OF MALIGNANT MELANOMA OF SKIN: ICD-10-CM

## 2020-10-24 NOTE — ASSESSMENT
[Verbal] : Verbal [Patient] : Patient [Good - alert, interested, motivated] : Good - alert, interested, motivated [Verbalizes knowledge/Understanding] : Verbalizes knowledge/understanding [Dressing changes] : dressing changes [Foot Care] : foot care [Skin Care] : skin care [Signs and symptoms of infection] : sign and symptoms of infection [How and When to Call] : how and when to call [Off-loading] : off-loading [Patient responsibility to plan of care] : patient responsibility to plan of care [Other: ____] : [unfilled] [Stable] : stable [Home] : Home [Ambulatory] : Ambulatory [Not Applicable - Long Term Care/Home Health Agency] : Long Term Care/Home Health Agency: Not Applicable [] : No [FreeTextEntry2] : Promote optimal skin integrity\par Offloading\par Infection prevention\par Puraply\par Sharps debridement\par F/U 1 week \par  [FreeTextEntry4] : Patient tolerated procedure well.\par Submitted for authorization for Puraply application #4 next week.\par F/U 1 week\par 1- 2x2 Puraply ordered on 10/12/20 for pts next assessment.\par

## 2020-10-24 NOTE — PHYSICAL EXAM
[4 x 4] : 4 x 4  [Abdominal Pad] : Abdominal Pad [Normal Breath Sounds] : Normal breath sounds [1+] : left 1+ [No Rash or Lesion] : No rash or lesion [Skin Ulcer] : ulcer [Alert] : alert [Oriented to Person] : oriented to person [Oriented to Place] : oriented to place [Oriented to Time] : oriented to time [Calm] : calm [Purpura] : no purpura  [Petechiae] : no petechiae [de-identified] : comfortable  [de-identified] : HTN [de-identified] : chronic OM and acute right 1st metatarsal right foot ,  [de-identified] :  1st metatarsal right foot , s/p met head resection  , granular , puraply application  [de-identified] : peripheral neuropathy  [FreeTextEntry1] :  plantar 1st metatarsal- Dry eschar  [de-identified] :  serosanguineous [de-identified] : callus [de-identified] : none [de-identified] : No treatment [de-identified] : Cleansed with Normal saline\par \par  [de-identified] : . Dressing applied by DPM** [FreeTextEntry7] :  dorsal foot 1st metatarsal [FreeTextEntry8] : 2.1 [FreeTextEntry9] : 1.2 [de-identified] : 0.1-0.2 [de-identified] :  serosanguineous [de-identified] : mildly [de-identified] : none [de-identified] : Puraply [de-identified] : PuraPly, Wound veil, calcium aginate [de-identified] : Cleansed with Normal saline\par \par \par Post debridement measurement: 2.2 x 1.3 x 0.2\par \par 1 unit of Puraply, 2 cm x 2 cm\par Lot #[id number]\par Exp. 1/14/23\par \par Reconstituted with NS\par Lot #[id number]\par Exp. 2/1/23\par \par 100% implanted, 0% discarded. [TWNoteComboBox1] : Right [TWNoteComboBox4] : None [TWNoteComboBox5] : No [TWNoteComboBox6] : Other [de-identified] : No [de-identified] : Normal [de-identified] : None [de-identified] : None [de-identified] : 100% [de-identified] : No [de-identified] : Daily [de-identified] : Primary Dressing [TWNoteComboBox9] : Right [de-identified] : Small [de-identified] : No [de-identified] : Other [de-identified] : No [de-identified] : Macerated [de-identified] : None [de-identified] : None [de-identified] : 100% [de-identified] : No [TWNoteComboBox8] : Simba [de-identified] : Application of skin substitute [de-identified] : Weekly [de-identified] : Primary Dressing

## 2020-10-24 NOTE — REASON FOR VISIT
[FreeTextEntry5] : dorsal right foot ulcer  [FreeTextEntry4] : Dorsal right foot , granular , clean  [FreeTextEntry3] : non healing wound

## 2020-10-24 NOTE — PROCEDURE
[Saline] : saline [Scalpel] : scalpel [Other: ___] : [unfilled] [Fenestrated] : fenestrated [Hydrated with saline] : hydrated with saline [Apligraf] : apligraf [____ % was used] : and [unfilled] % was used [____ % was discarded] : and [unfilled] % was discarded [FreeTextEntry1] : wound veil/ alginate  [] : No [FreeTextEntry9] : 1:11pm [de-identified] : Open ulcer dorsal right foot  [de-identified] : Thor [FreeTextEntry6] : Dorsal ulcer right foot  [FreeTextEntry7] : same [de-identified] : 0 [de-identified] : 2cc [de-identified] : kamilla

## 2020-10-24 NOTE — VITALS
[Pain related to present condition?] : The patient's  pain is not related to present condition. [] : No [de-identified] : 0/10

## 2020-10-24 NOTE — REVIEW OF SYSTEMS
[Arthralgias] : arthralgias [Joint Stiffness] : joint stiffness [Skin Wound] : skin wound [Negative] : Endocrine [FreeTextEntry1] : 8367 [Fever] : no fever [Chills] : no chills [Eye Pain] : no eye pain [Loss Of Hearing] : no hearing loss [Shortness Of Breath] : no shortness of breath [Wheezing] : no wheezing [Abdominal Pain] : no abdominal pain [Vomiting] : no vomiting [Anxiety] : no anxiety [Easy Bleeding] : no tendency for easy bleeding [Easy Bruising] : no tendency for easy bruising [FreeTextEntry5] : HTN [FreeTextEntry9] : Hammer toes and forefoot deformity  [de-identified] : Stage 3 pressure ulcer plantar 1st met right foot , s/p bone resection , healing , puraply application   [de-identified] : peripheral neuropathy

## 2020-10-27 ENCOUNTER — OUTPATIENT (OUTPATIENT)
Dept: OUTPATIENT SERVICES | Facility: HOSPITAL | Age: 70
LOS: 1 days | Discharge: HOME | End: 2020-10-27
Payer: COMMERCIAL

## 2020-10-27 ENCOUNTER — APPOINTMENT (OUTPATIENT)
Dept: PODIATRY | Facility: HOSPITAL | Age: 70
End: 2020-10-27

## 2020-10-27 ENCOUNTER — APPOINTMENT (OUTPATIENT)
Dept: WOUND CARE | Facility: HOSPITAL | Age: 70
End: 2020-10-27
Payer: MEDICARE

## 2020-10-27 VITALS
BODY MASS INDEX: 30.71 KG/M2 | DIASTOLIC BLOOD PRESSURE: 94 MMHG | TEMPERATURE: 97.5 F | OXYGEN SATURATION: 98 % | RESPIRATION RATE: 20 BRPM | SYSTOLIC BLOOD PRESSURE: 140 MMHG | HEART RATE: 75 BPM | WEIGHT: 247 LBS | HEIGHT: 75 IN

## 2020-10-27 DIAGNOSIS — Z85.828 PERSONAL HISTORY OF OTHER MALIGNANT NEOPLASM OF SKIN: Chronic | ICD-10-CM

## 2020-10-27 DIAGNOSIS — E11.621 TYPE 2 DIABETES MELLITUS WITH FOOT ULCER: ICD-10-CM

## 2020-10-27 PROCEDURE — G0463: CPT

## 2020-10-27 PROCEDURE — 99024 POSTOP FOLLOW-UP VISIT: CPT

## 2020-10-27 NOTE — ASSESSMENT
[FreeTextEntry2] : Restore Optimal Skin Integrity \par Infection Control\par Localized wound care\par Maintain acceptable pain levels at satisfactory relief.\par Demonstrates use of both nonpharmacological and pharmacological pain relief strategies [FreeTextEntry4] : Photos Taken\par Pt to F/U to [facility] in 1 Week

## 2020-10-27 NOTE — REVIEW OF SYSTEMS
[Arthralgias] : arthralgias [Joint Stiffness] : joint stiffness [Skin Wound] : skin wound [Negative] : Endocrine [Fever] : no fever [Chills] : no chills [Eye Pain] : no eye pain [Loss Of Hearing] : no hearing loss [Shortness Of Breath] : no shortness of breath [Wheezing] : no wheezing [Abdominal Pain] : no abdominal pain [Vomiting] : no vomiting [Anxiety] : no anxiety [Easy Bleeding] : no tendency for easy bleeding [Easy Bruising] : no tendency for easy bruising [FreeTextEntry5] : HTN [FreeTextEntry9] : Hammer toes and forefoot deformity  [de-identified] : Stage 3 pressure ulcer plantar 1st met right foot , s/p bone resection , healing [de-identified] : peripheral neuropathy

## 2020-10-27 NOTE — PHYSICAL EXAM
98.2 [Normal Breath Sounds] : Normal breath sounds [1+] : left 1+ [No Rash or Lesion] : No rash or lesion [Skin Ulcer] : ulcer [Alert] : alert [Oriented to Person] : oriented to person [Oriented to Place] : oriented to place [Oriented to Time] : oriented to time [Calm] : calm [Purpura] : no purpura  [Petechiae] : no petechiae [de-identified] : comfortable  [de-identified] : HTN [de-identified] : chronic OM and acute right 1st metatarsal right foot ,  [de-identified] :  1st metatarsal right foot , s/p met head resection  , granular [de-identified] : peripheral neuropathy  [FreeTextEntry1] : Right Plantar 1st Met-callus/Dry Eschar [de-identified] : Callus [de-identified] : No Product [de-identified] : Cleansed with Normal Saline\par  [de-identified] : \par  [FreeTextEntry7] : Right Dorsal Foot 1st Met [FreeTextEntry8] : 0.3 [FreeTextEntry9] : 0.3 [de-identified] : 0.1 [de-identified] : Serosanguineous [de-identified] : Intact [de-identified] : Silver Alginate  [de-identified] : Cleansed with Normal Saline\par \par  [TWNoteComboBox4] : None [TWNoteComboBox5] : No [de-identified] : No [de-identified] : Small [de-identified] : No [de-identified] : Surgical [de-identified] : No [de-identified] : None [de-identified] : None [de-identified] : 100% [de-identified] : No [de-identified] : 3x Weekly

## 2020-10-28 DIAGNOSIS — Z82.49 FAMILY HISTORY OF ISCHEMIC HEART DISEASE AND OTHER DISEASES OF THE CIRCULATORY SYSTEM: ICD-10-CM

## 2020-10-28 DIAGNOSIS — Z85.820 PERSONAL HISTORY OF MALIGNANT MELANOMA OF SKIN: ICD-10-CM

## 2020-10-28 DIAGNOSIS — I11.9 HYPERTENSIVE HEART DISEASE WITHOUT HEART FAILURE: ICD-10-CM

## 2020-10-28 DIAGNOSIS — G62.9 POLYNEUROPATHY, UNSPECIFIED: ICD-10-CM

## 2020-10-28 DIAGNOSIS — Z87.828 PERSONAL HISTORY OF OTHER (HEALED) PHYSICAL INJURY AND TRAUMA: ICD-10-CM

## 2020-10-28 DIAGNOSIS — M86.171 OTHER ACUTE OSTEOMYELITIS, RIGHT ANKLE AND FOOT: ICD-10-CM

## 2020-10-28 DIAGNOSIS — Z84.0 FAMILY HISTORY OF DISEASES OF THE SKIN AND SUBCUTANEOUS TISSUE: ICD-10-CM

## 2020-10-28 DIAGNOSIS — Z79.899 OTHER LONG TERM (CURRENT) DRUG THERAPY: ICD-10-CM

## 2020-10-28 DIAGNOSIS — I48.91 UNSPECIFIED ATRIAL FIBRILLATION: ICD-10-CM

## 2020-10-28 DIAGNOSIS — K57.32 DIVERTICULITIS OF LARGE INTESTINE WITHOUT PERFORATION OR ABSCESS WITHOUT BLEEDING: ICD-10-CM

## 2020-10-28 DIAGNOSIS — M86.671 OTHER CHRONIC OSTEOMYELITIS, RIGHT ANKLE AND FOOT: ICD-10-CM

## 2020-10-28 DIAGNOSIS — L89.893 PRESSURE ULCER OF OTHER SITE, STAGE 3: ICD-10-CM

## 2020-11-03 ENCOUNTER — APPOINTMENT (OUTPATIENT)
Dept: WOUND CARE | Facility: HOSPITAL | Age: 70
End: 2020-11-03

## 2020-11-03 ENCOUNTER — APPOINTMENT (OUTPATIENT)
Dept: PLASTIC SURGERY | Facility: HOSPITAL | Age: 70
End: 2020-11-03
Payer: MEDICARE

## 2020-11-03 ENCOUNTER — OUTPATIENT (OUTPATIENT)
Dept: OUTPATIENT SERVICES | Facility: HOSPITAL | Age: 70
LOS: 1 days | Discharge: HOME | End: 2020-11-03
Payer: COMMERCIAL

## 2020-11-03 VITALS
HEART RATE: 90 BPM | DIASTOLIC BLOOD PRESSURE: 94 MMHG | RESPIRATION RATE: 20 BRPM | HEIGHT: 75 IN | OXYGEN SATURATION: 98 % | BODY MASS INDEX: 30.71 KG/M2 | SYSTOLIC BLOOD PRESSURE: 145 MMHG | TEMPERATURE: 97.6 F | WEIGHT: 247 LBS

## 2020-11-03 DIAGNOSIS — Z85.828 PERSONAL HISTORY OF OTHER MALIGNANT NEOPLASM OF SKIN: Chronic | ICD-10-CM

## 2020-11-03 DIAGNOSIS — E11.621 TYPE 2 DIABETES MELLITUS WITH FOOT ULCER: ICD-10-CM

## 2020-11-03 PROCEDURE — 99213 OFFICE O/P EST LOW 20 MIN: CPT

## 2020-11-03 PROCEDURE — G0463: CPT

## 2020-11-03 NOTE — ASSESSMENT
[] : No [FreeTextEntry2] : Restore Optimal Skin Integrity \par Infection Control\par Localized wound care\par Maintain acceptable pain levels at satisfactory relief.\par Demonstrates use of both nonpharmacological and pharmacological pain relief strategies [FreeTextEntry4] : Pt to F/U to [facility] in 1 Week

## 2020-11-03 NOTE — REVIEW OF SYSTEMS
[Arthralgias] : arthralgias [Joint Stiffness] : joint stiffness [Skin Wound] : skin wound [Negative] : Endocrine [Fever] : no fever [Chills] : no chills [Eye Pain] : no eye pain [Loss Of Hearing] : no hearing loss [Shortness Of Breath] : no shortness of breath [Wheezing] : no wheezing [Abdominal Pain] : no abdominal pain [Vomiting] : no vomiting [Anxiety] : no anxiety [Easy Bleeding] : no tendency for easy bleeding [Easy Bruising] : no tendency for easy bruising [FreeTextEntry5] : HTN [FreeTextEntry9] : Hammer toes and forefoot deformity  [de-identified] : Stage 3 pressure ulcer plantar 1st met right foot , s/p bone resection , healing [de-identified] : peripheral neuropathy

## 2020-11-03 NOTE — PHYSICAL EXAM
[Normal Breath Sounds] : Normal breath sounds [1+] : left 1+ [No Rash or Lesion] : No rash or lesion [Skin Ulcer] : ulcer [Alert] : alert [Oriented to Person] : oriented to person [Oriented to Place] : oriented to place [Oriented to Time] : oriented to time [Calm] : calm [Purpura] : no purpura  [Petechiae] : no petechiae [de-identified] : comfortable  [de-identified] : HTN [de-identified] : chronic OM and acute right 1st metatarsal right foot ,  [de-identified] :  1st metatarsal right foot , s/p met head resection  , granular [de-identified] : peripheral neuropathy  [FreeTextEntry1] : Right Plantar 1st Met-callus/Dry Eschar [de-identified] : Callus [de-identified] : No Product [de-identified] : Cleansed with Normal Saline\par  [de-identified] : \par  [FreeTextEntry7] : Right Dorsal Foot 1st Met [FreeTextEntry8] : 0.3 [FreeTextEntry9] : 0.1 [de-identified] : 0.1 [de-identified] : Serosanguineous [de-identified] : Intact [de-identified] : Silver Alginate  [de-identified] : Cleansed with Normal Saline\par \par  [TWNoteComboBox4] : None [TWNoteComboBox5] : No [de-identified] : No [de-identified] : Small [de-identified] : No [de-identified] : Surgical [de-identified] : No [de-identified] : None [de-identified] : None [de-identified] : 100% [de-identified] : No [de-identified] : 3x Weekly

## 2020-11-03 NOTE — HISTORY OF PRESENT ILLNESS
[FreeTextEntry1] : s/p resection of bone 1st metatarsal right foot , heeling well . Plantar ulcer is epithelialized, the dorsal dehiscence is smaller and clean and granular \par 11/3/20 almost completely closed wound

## 2020-11-03 NOTE — PLAN
[FreeTextEntry1] : Patient examined and evaluated at this time.\par Continue local wound care and offloading.Ag alginte and DD 3xWeek\par Pt to follow up in 1 week.

## 2020-11-04 DIAGNOSIS — Z82.49 FAMILY HISTORY OF ISCHEMIC HEART DISEASE AND OTHER DISEASES OF THE CIRCULATORY SYSTEM: ICD-10-CM

## 2020-11-04 DIAGNOSIS — L89.893 PRESSURE ULCER OF OTHER SITE, STAGE 3: ICD-10-CM

## 2020-11-04 DIAGNOSIS — M86.171 OTHER ACUTE OSTEOMYELITIS, RIGHT ANKLE AND FOOT: ICD-10-CM

## 2020-11-04 DIAGNOSIS — Z85.820 PERSONAL HISTORY OF MALIGNANT MELANOMA OF SKIN: ICD-10-CM

## 2020-11-04 DIAGNOSIS — I11.9 HYPERTENSIVE HEART DISEASE WITHOUT HEART FAILURE: ICD-10-CM

## 2020-11-04 DIAGNOSIS — G62.9 POLYNEUROPATHY, UNSPECIFIED: ICD-10-CM

## 2020-11-04 DIAGNOSIS — Z79.899 OTHER LONG TERM (CURRENT) DRUG THERAPY: ICD-10-CM

## 2020-11-04 DIAGNOSIS — M86.671 OTHER CHRONIC OSTEOMYELITIS, RIGHT ANKLE AND FOOT: ICD-10-CM

## 2020-11-04 DIAGNOSIS — Z87.828 PERSONAL HISTORY OF OTHER (HEALED) PHYSICAL INJURY AND TRAUMA: ICD-10-CM

## 2020-11-04 DIAGNOSIS — K57.32 DIVERTICULITIS OF LARGE INTESTINE WITHOUT PERFORATION OR ABSCESS WITHOUT BLEEDING: ICD-10-CM

## 2020-11-04 DIAGNOSIS — Z84.0 FAMILY HISTORY OF DISEASES OF THE SKIN AND SUBCUTANEOUS TISSUE: ICD-10-CM

## 2020-11-04 DIAGNOSIS — I48.91 UNSPECIFIED ATRIAL FIBRILLATION: ICD-10-CM

## 2020-11-10 ENCOUNTER — APPOINTMENT (OUTPATIENT)
Dept: SURGERY | Facility: HOSPITAL | Age: 70
End: 2020-11-10

## 2020-11-10 ENCOUNTER — OUTPATIENT (OUTPATIENT)
Dept: OUTPATIENT SERVICES | Facility: HOSPITAL | Age: 70
LOS: 1 days | Discharge: HOME | End: 2020-11-10
Payer: COMMERCIAL

## 2020-11-10 ENCOUNTER — APPOINTMENT (OUTPATIENT)
Dept: PODIATRY | Facility: HOSPITAL | Age: 70
End: 2020-11-10
Payer: MEDICARE

## 2020-11-10 VITALS
WEIGHT: 247 LBS | DIASTOLIC BLOOD PRESSURE: 97 MMHG | RESPIRATION RATE: 20 BRPM | OXYGEN SATURATION: 100 % | HEIGHT: 75 IN | BODY MASS INDEX: 30.71 KG/M2 | SYSTOLIC BLOOD PRESSURE: 142 MMHG | TEMPERATURE: 97.1 F | HEART RATE: 95 BPM

## 2020-11-10 DIAGNOSIS — Z85.828 PERSONAL HISTORY OF OTHER MALIGNANT NEOPLASM OF SKIN: Chronic | ICD-10-CM

## 2020-11-10 DIAGNOSIS — E11.621 TYPE 2 DIABETES MELLITUS WITH FOOT ULCER: ICD-10-CM

## 2020-11-10 DIAGNOSIS — M86.171 OTHER ACUTE OSTEOMYELITIS, RIGHT ANKLE AND FOOT: ICD-10-CM

## 2020-11-10 DIAGNOSIS — M86.671 OTHER CHRONIC OSTEOMYELITIS, RIGHT ANKLE AND FOOT: ICD-10-CM

## 2020-11-10 PROCEDURE — 99024 POSTOP FOLLOW-UP VISIT: CPT

## 2020-11-10 PROCEDURE — G0463: CPT

## 2020-11-10 RX ORDER — SULFAMETHOXAZOLE AND TRIMETHOPRIM 800; 160 MG/1; MG/1
800-160 TABLET ORAL
Qty: 14 | Refills: 0 | Status: DISCONTINUED | COMMUNITY
Start: 2020-05-28 | End: 2020-11-10

## 2020-11-10 RX ORDER — CIPROFLOXACIN HYDROCHLORIDE 500 MG/1
500 TABLET, FILM COATED ORAL
Qty: 14 | Refills: 0 | Status: DISCONTINUED | COMMUNITY
Start: 2020-05-28 | End: 2020-11-10

## 2020-11-10 RX ORDER — CEFPODOXIME PROXETIL 200 MG/1
200 TABLET, FILM COATED ORAL
Refills: 0 | Status: DISCONTINUED | COMMUNITY
End: 2020-11-10

## 2020-11-11 DIAGNOSIS — I11.9 HYPERTENSIVE HEART DISEASE WITHOUT HEART FAILURE: ICD-10-CM

## 2020-11-11 DIAGNOSIS — Z87.828 PERSONAL HISTORY OF OTHER (HEALED) PHYSICAL INJURY AND TRAUMA: ICD-10-CM

## 2020-11-11 DIAGNOSIS — I48.91 UNSPECIFIED ATRIAL FIBRILLATION: ICD-10-CM

## 2020-11-11 DIAGNOSIS — G62.9 POLYNEUROPATHY, UNSPECIFIED: ICD-10-CM

## 2020-11-11 DIAGNOSIS — Z85.820 PERSONAL HISTORY OF MALIGNANT MELANOMA OF SKIN: ICD-10-CM

## 2020-11-11 DIAGNOSIS — Z82.49 FAMILY HISTORY OF ISCHEMIC HEART DISEASE AND OTHER DISEASES OF THE CIRCULATORY SYSTEM: ICD-10-CM

## 2020-11-11 DIAGNOSIS — Z84.0 FAMILY HISTORY OF DISEASES OF THE SKIN AND SUBCUTANEOUS TISSUE: ICD-10-CM

## 2020-11-11 DIAGNOSIS — Z79.899 OTHER LONG TERM (CURRENT) DRUG THERAPY: ICD-10-CM

## 2020-11-11 DIAGNOSIS — L89.893 PRESSURE ULCER OF OTHER SITE, STAGE 3: ICD-10-CM

## 2020-11-11 DIAGNOSIS — M86.171 OTHER ACUTE OSTEOMYELITIS, RIGHT ANKLE AND FOOT: ICD-10-CM

## 2020-11-11 DIAGNOSIS — M86.671 OTHER CHRONIC OSTEOMYELITIS, RIGHT ANKLE AND FOOT: ICD-10-CM

## 2020-11-11 DIAGNOSIS — K57.32 DIVERTICULITIS OF LARGE INTESTINE WITHOUT PERFORATION OR ABSCESS WITHOUT BLEEDING: ICD-10-CM

## 2020-11-11 NOTE — REVIEW OF SYSTEMS
[Fever] : no fever [Chills] : no chills [Eye Pain] : no eye pain [Loss Of Hearing] : no hearing loss [Shortness Of Breath] : no shortness of breath [Wheezing] : no wheezing [Abdominal Pain] : no abdominal pain [Vomiting] : no vomiting [Arthralgias] : arthralgias [Joint Stiffness] : joint stiffness [Skin Wound] : skin wound [Anxiety] : no anxiety [Easy Bleeding] : no tendency for easy bleeding [Easy Bruising] : no tendency for easy bruising [Negative] : Endocrine [FreeTextEntry5] : HTN [FreeTextEntry9] : Hammer toes and forefoot deformity  [de-identified] : Stage 3 pressure ulcer plantar 1st met right foot , s/p bone resection , healed [de-identified] : peripheral neuropathy

## 2020-11-11 NOTE — VITALS
[] : No [de-identified] : Pain scale:  0/10 - Patient reports no c/o pains or discomforts at present.

## 2020-11-11 NOTE — ASSESSMENT
[FreeTextEntry2] : Promote optimal skin integrity, infection prevention\par  [FreeTextEntry4] : Wounds closed\par Patient discharged

## 2020-11-11 NOTE — PLAN
[FreeTextEntry1] : Patient all healed , he is happy with the outcome . Patient discharged with full instructions

## 2020-11-11 NOTE — PHYSICAL EXAM
[Normal Breath Sounds] : Normal breath sounds [1+] : left 1+ [No Rash or Lesion] : No rash or lesion [Purpura] : no purpura  [Petechiae] : no petechiae [Skin Ulcer] : ulcer [Alert] : alert [Oriented to Person] : oriented to person [Oriented to Place] : oriented to place [Oriented to Time] : oriented to time [Calm] : calm [de-identified] : comfortable  [de-identified] : HTN [de-identified] : chronic OM and acute right 1st metatarsal right foot ,  [de-identified] :  1st metatarsal right foot , s/p met head resection  , healed plantar and dorsal  [de-identified] : peripheral neuropathy  [de-identified] : callus/dry eschar removed by DPM - wound closed [FreeTextEntry1] : Right plantar 1st met - callus/dry eschar  [de-identified] : NSC [de-identified] : Dry eschar removed by DPM - wound closed [FreeTextEntry7] : Right dorsal foot 1st met - dry eschar [de-identified] : NSC [TWNoteComboBox4] : None [de-identified] : None

## 2020-12-16 ENCOUNTER — APPOINTMENT (OUTPATIENT)
Dept: ORTHOPEDIC SURGERY | Facility: CLINIC | Age: 70
End: 2020-12-16

## 2020-12-18 ENCOUNTER — APPOINTMENT (OUTPATIENT)
Dept: CARDIOLOGY | Facility: CLINIC | Age: 70
End: 2020-12-18
Payer: MEDICARE

## 2020-12-18 ENCOUNTER — NON-APPOINTMENT (OUTPATIENT)
Age: 70
End: 2020-12-18

## 2020-12-18 VITALS
OXYGEN SATURATION: 99 % | HEIGHT: 75 IN | HEART RATE: 89 BPM | SYSTOLIC BLOOD PRESSURE: 159 MMHG | DIASTOLIC BLOOD PRESSURE: 92 MMHG | BODY MASS INDEX: 30.84 KG/M2 | WEIGHT: 248 LBS

## 2020-12-18 PROCEDURE — 93000 ELECTROCARDIOGRAM COMPLETE: CPT

## 2020-12-18 PROCEDURE — 99072 ADDL SUPL MATRL&STAF TM PHE: CPT

## 2020-12-18 PROCEDURE — 99214 OFFICE O/P EST MOD 30 MIN: CPT

## 2020-12-18 NOTE — DISCUSSION/SUMMARY
[___ Month(s)] : [unfilled] month(s) [FreeTextEntry1] : In 8/2020, Celestine was admitted with osteomyelitis of his right foot, and found to be in rate-controlled atrial fibrillation.\par He has no worrisome symptoms today. His blood pressure is elevated, though has been better at home, and he will begin to check periodically. His physical exam is unremarkable. He remains in rate-controlled atrial fibrillation.\par \par He will remain on Toprol-XL, along with anticoagulation. He is to stay active, and eat right. He will have an echocardiogram to confirm normal LV function. We will hold off on dccv and/or ablation given the fact that he remains asymptomatic. I will see him again in 3-4 months.

## 2020-12-18 NOTE — PHYSICAL EXAM
[General Appearance - Well Developed] : well developed [Normal Appearance] : normal appearance [Well Groomed] : well groomed [General Appearance - Well Nourished] : well nourished [No Deformities] : no deformities [General Appearance - In No Acute Distress] : no acute distress [Normal Conjunctiva] : the conjunctiva exhibited no abnormalities [Eyelids - No Xanthelasma] : the eyelids demonstrated no xanthelasmas [Normal Oral Mucosa] : normal oral mucosa [No Oral Pallor] : no oral pallor [No Oral Cyanosis] : no oral cyanosis [Normal Jugular Venous A Waves Present] : normal jugular venous A waves present [Normal Jugular Venous V Waves Present] : normal jugular venous V waves present [No Jugular Venous Wilson A Waves] : no jugular venous wilson A waves [Respiration, Rhythm And Depth] : normal respiratory rhythm and effort [Exaggerated Use Of Accessory Muscles For Inspiration] : no accessory muscle use [Auscultation Breath Sounds / Voice Sounds] : lungs were clear to auscultation bilaterally [Abdomen Soft] : soft [Abdomen Tenderness] : non-tender [Abdomen Mass (___ Cm)] : no abdominal mass palpated [Abnormal Walk] : normal gait [Gait - Sufficient For Exercise Testing] : the gait was sufficient for exercise testing [FreeTextEntry1] : right foot in boot [Nail Clubbing] : no clubbing of the fingernails [Cyanosis, Localized] : no localized cyanosis [Petechial Hemorrhages (___cm)] : no petechial hemorrhages [Skin Color & Pigmentation] : normal skin color and pigmentation [] : no rash [No Venous Stasis] : no venous stasis [Skin Lesions] : no skin lesions [No Skin Ulcers] : no skin ulcer [No Xanthoma] : no  xanthoma was observed [Oriented To Time, Place, And Person] : oriented to person, place, and time [Affect] : the affect was normal [Mood] : the mood was normal [No Anxiety] : not feeling anxious [Normal Rate] : normal [Normal S1] : normal S1 [Normal S2] : normal S2 [S3] : no S3 [S4] : no S4 [No Murmur] : no murmurs heard [Right Carotid Bruit] : no bruit heard over the right carotid [Left Carotid Bruit] : no bruit heard over the left carotid [Right Femoral Bruit] : no bruit heard over the right femoral artery [Left Femoral Bruit] : no bruit heard over the left femoral artery [2+] : left 2+ [No Abnormalities] : the abdominal aorta was not enlarged and no bruit was heard [___ +] : [unfilled]U+ pitting edema to the right ankle

## 2020-12-18 NOTE — HISTORY OF PRESENT ILLNESS
[FreeTextEntry1] : Celestine is a 70-year-old male here for follow up.\par I last saw him in 9/2020.\par His past medical history is notable for neuropathy/back surgery, a right foot ulcer, melanoma status post resection, and hypertension.\par He is now status post amputation of his first metatarsal head in 8/20. He was found to have OM, and finished abx through a picc line.\par \par He has a history of atrial fibrillation, status post cardioversion in the past in 2019. After a resection of a melanoma, in the recovery room he was noted to be in asymptomatic AF.  He was anticoagulated, rate controlled.  Echo unremarkable.  Noted to have brief wct and so had a nuclear stress, which revealed a small area of inferolateral ischemia, which was decided to be managed conservatively.  YANICK DCCV was performed, and he was discharged in SR.\par \par He is feeling well today. He has no chest pain, dyspnea, or palpitations. He has no symptoms to suggest fast heart rates. He also denies dizziness, lightheadedness, or near syncope. His foot has recovered well.\par

## 2021-01-08 ENCOUNTER — APPOINTMENT (OUTPATIENT)
Dept: CARDIOLOGY | Facility: CLINIC | Age: 71
End: 2021-01-08

## 2021-02-09 ENCOUNTER — NON-APPOINTMENT (OUTPATIENT)
Age: 71
End: 2021-02-09

## 2021-03-08 ENCOUNTER — APPOINTMENT (OUTPATIENT)
Dept: CARDIOLOGY | Facility: CLINIC | Age: 71
End: 2021-03-08
Payer: MEDICARE

## 2021-03-08 ENCOUNTER — MED ADMIN CHARGE (OUTPATIENT)
Age: 71
End: 2021-03-08

## 2021-03-08 PROCEDURE — 93306 TTE W/DOPPLER COMPLETE: CPT

## 2021-03-08 PROCEDURE — 99072 ADDL SUPL MATRL&STAF TM PHE: CPT

## 2021-03-09 RX ORDER — PERFLUTREN 6.52 MG/ML
6.52 INJECTION, SUSPENSION INTRAVENOUS
Qty: 1 | Refills: 0 | Status: COMPLETED | OUTPATIENT
Start: 2021-03-09

## 2021-03-09 RX ADMIN — PERFLUTREN MG/ML: 6.52 INJECTION, SUSPENSION INTRAVENOUS at 00:00

## 2021-04-15 ENCOUNTER — RX RENEWAL (OUTPATIENT)
Age: 71
End: 2021-04-15

## 2021-04-15 ENCOUNTER — APPOINTMENT (OUTPATIENT)
Dept: CARDIOLOGY | Facility: CLINIC | Age: 71
End: 2021-04-15
Payer: MEDICARE

## 2021-04-15 ENCOUNTER — NON-APPOINTMENT (OUTPATIENT)
Age: 71
End: 2021-04-15

## 2021-04-15 VITALS
DIASTOLIC BLOOD PRESSURE: 96 MMHG | BODY MASS INDEX: 31.58 KG/M2 | SYSTOLIC BLOOD PRESSURE: 164 MMHG | HEART RATE: 82 BPM | OXYGEN SATURATION: 100 % | HEIGHT: 75 IN | WEIGHT: 254 LBS

## 2021-04-15 DIAGNOSIS — I25.10 ATHEROSCLEROTIC HEART DISEASE OF NATIVE CORONARY ARTERY W/OUT ANGINA PECTORIS: ICD-10-CM

## 2021-04-15 PROCEDURE — 99072 ADDL SUPL MATRL&STAF TM PHE: CPT

## 2021-04-15 PROCEDURE — 99214 OFFICE O/P EST MOD 30 MIN: CPT

## 2021-04-15 PROCEDURE — 93000 ELECTROCARDIOGRAM COMPLETE: CPT

## 2021-04-15 RX ORDER — LOSARTAN POTASSIUM 25 MG/1
25 TABLET, FILM COATED ORAL DAILY
Qty: 30 | Refills: 5 | Status: DISCONTINUED | COMMUNITY
Start: 2021-04-15 | End: 2021-04-15

## 2021-04-15 NOTE — REASON FOR VISIT
Patient is a 69y old  Female who presents with a chief complaint of Catheter associated UTI and AMADOU from dehydration. (27 Aug 2020 10:38)      INTERVAL HPI/OVERNIGHT EVENTS:    No new issues; stable    REVIEW OF SYSTEMS:   Remaining ROS negative    MEDICATIONS  (STANDING):  amLODIPine   Tablet 10 milliGRAM(s) Oral daily  BACItracin   Ointment 1 Application(s) Topical daily  ciprofloxacin     Tablet 500 milliGRAM(s) Oral every 12 hours  cloNIDine 0.1 milliGRAM(s) Oral two times a day  heparin   Injectable 5000 Unit(s) SubCutaneous every 12 hours  hydrALAZINE 25 milliGRAM(s) Oral every 8 hours  levETIRAcetam 500 milliGRAM(s) Oral two times a day  metoprolol tartrate 50 milliGRAM(s) Oral two times a day  polyethylene glycol 3350 17 Gram(s) Oral two times a day  potassium chloride    Tablet ER 10 milliEquivalent(s) Oral two times a day  senna 2 Tablet(s) Oral at bedtime  simvastatin 20 milliGRAM(s) Oral at bedtime  vitamin A &amp; D Ointment 1 Application(s) Topical two times a day    MEDICATIONS  (PRN):  acetaminophen   Tablet .. 650 milliGRAM(s) Oral every 6 hours PRN Temp greater or equal to 38C (100.4F), Mild Pain (1 - 3)      Allergies    No Known Allergies    Intolerances        Vital Signs Last 24 Hrs  T(C): 37.3 (27 Aug 2020 05:20), Max: 37.5 (26 Aug 2020 23:20)  T(F): 99.2 (27 Aug 2020 05:20), Max: 99.5 (26 Aug 2020 23:20)  HR: 64 (27 Aug 2020 05:20) (64 - 77)  BP: 170/72 (27 Aug 2020 05:20) (151/69 - 170/72)  BP(mean): --  RR: 17 (27 Aug 2020 05:20) (17 - 17)  SpO2: 100% (27 Aug 2020 05:20) (98% - 100%)    PHYSICAL EXAM:  GENERAL: NAD  HEAD:  Atraumatic, Normocephalic  EYES: EOMI, PERRLA, conjunctiva and sclera clear  ENT: O/P Clear  NECK: Supple, No JVD  NERVOUS SYSTEM:  No focal deficits  CHEST/LUNG: Clear to percussion bilaterally; No rales, rhonchi, wheezing  HEART: Regular rate and rhythm; No murmurs, rubs, or gallops  ABDOMEN: Soft, Nontender, Nondistended; Bowel sounds present  EXTREMITIES:  2+ Peripheral Pulses, No clubbing, cyanosis, or edema  SKIN: No rashes or lesions    LABS:                        8.6    8.94  )-----------( 293      ( 26 Aug 2020 07:24 )             26.2     08-27    141  |  109<H>  |  13  ----------------------------<  131<H>  3.4<L>   |  25  |  0.76    Ca    8.3<L>      27 Aug 2020 06:40  Phos  2.4     08-27  Mg     2.0     08-27          CAPILLARY BLOOD GLUCOSE          RADIOLOGY & ADDITIONAL TESTS:    Imaging Personally Reviewed:  [ ] YES  [ ] NO    Consultant(s) Notes Reviewed:  [ ] YES  [ ] NO    Care Discussed with Consultants/Other Providers [ ] YES  [ ] NO [Follow-Up - Clinic] : a clinic follow-up of [FreeTextEntry1] : leg ulcer

## 2021-04-15 NOTE — DISCUSSION/SUMMARY
[___ Month(s)] : [unfilled] month(s) [FreeTextEntry1] : In 8/2020, Celestine was admitted with osteomyelitis of his right foot, and found to be in rate-controlled atrial fibrillation.\par He has no worrisome symptoms today. His blood pressure is elevated, though has been better at home, and he will continue to check periodically. His physical exam is unremarkable. He remains in rate-controlled atrial fibrillation.\par \par He did have a stress test in 2018, that did demonstrate inferolateral ischemia, and his recent echocardiogram did demonstrate mild distal inferior hypokinesis, with an overall preserved EF.  We will repeat a pharmacologic stress test (he will be unable to walk on a treadmill) to evaluate this wall motion abnormality and previously seen possible ischemia. \par \par He will remain on Toprol-XL, and I ideally would like to add an ACE/ARB.  He had recent blood work demonstrating a potassium of 5.4, and a creatinine of 1.5, so I will need to hold off for now.  He has repeat blood work later this month.\par \par He will remain on anticoagulation. He is to stay active, and eat right. I will see him again in 3-4 months.

## 2021-04-15 NOTE — HISTORY OF PRESENT ILLNESS
[FreeTextEntry1] : Celestine is a 70-year-old male here for follow up.\par I last saw him in 12/2020.\par His past medical history is notable for neuropathy/back surgery, a right foot ulcer, melanoma status post resection, and hypertension.\par He is now status post amputation of his first metatarsal head in 8/20. He was found to have OM, and finished abx through a picc line.\par \par He has a history of atrial fibrillation, status post cardioversion in the past in 2019. After a resection of a melanoma, in the recovery room he was noted to be in asymptomatic AF.  He was anticoagulated, rate controlled.  Echo unremarkable.  Noted to have brief wct and so had a nuclear stress, which revealed a small area of inferolateral ischemia, which was decided to be managed conservatively.  YANICK DCCV was performed, and he was discharged in SR.\par \par He is feeling well today. He has no chest pain, dyspnea, or palpitations. He has no symptoms to suggest fast heart rates. He also denies dizziness, lightheadedness, or near syncope. His foot has recovered well.\par Echocardiogram in 2021 demonstrated a overall preserved EF, though with mild distal inferior wall hypokinesis.\par

## 2021-04-21 ENCOUNTER — NON-APPOINTMENT (OUTPATIENT)
Age: 71
End: 2021-04-21

## 2021-04-23 ENCOUNTER — APPOINTMENT (OUTPATIENT)
Dept: CARDIOLOGY | Facility: CLINIC | Age: 71
End: 2021-04-23
Payer: MEDICARE

## 2021-04-23 PROCEDURE — 78452 HT MUSCLE IMAGE SPECT MULT: CPT

## 2021-04-23 PROCEDURE — A9500: CPT

## 2021-04-23 PROCEDURE — 99072 ADDL SUPL MATRL&STAF TM PHE: CPT

## 2021-04-23 PROCEDURE — 93015 CV STRESS TEST SUPVJ I&R: CPT

## 2021-07-23 ENCOUNTER — APPOINTMENT (OUTPATIENT)
Dept: WOUND CARE | Facility: HOSPITAL | Age: 71
End: 2021-07-23
Payer: MEDICARE

## 2021-07-23 ENCOUNTER — RESULT REVIEW (OUTPATIENT)
Age: 71
End: 2021-07-23

## 2021-07-23 ENCOUNTER — OUTPATIENT (OUTPATIENT)
Dept: OUTPATIENT SERVICES | Facility: HOSPITAL | Age: 71
LOS: 1 days | Discharge: HOME | End: 2021-07-23
Payer: COMMERCIAL

## 2021-07-23 VITALS
RESPIRATION RATE: 20 BRPM | HEART RATE: 87 BPM | SYSTOLIC BLOOD PRESSURE: 137 MMHG | WEIGHT: 254 LBS | BODY MASS INDEX: 31.58 KG/M2 | HEIGHT: 75 IN | DIASTOLIC BLOOD PRESSURE: 96 MMHG | OXYGEN SATURATION: 98 % | TEMPERATURE: 97.5 F

## 2021-07-23 DIAGNOSIS — Z85.828 PERSONAL HISTORY OF OTHER MALIGNANT NEOPLASM OF SKIN: Chronic | ICD-10-CM

## 2021-07-23 DIAGNOSIS — S91.309A UNSPECIFIED OPEN WOUND, UNSPECIFIED FOOT, INITIAL ENCOUNTER: ICD-10-CM

## 2021-07-23 PROCEDURE — 99214 OFFICE O/P EST MOD 30 MIN: CPT

## 2021-07-23 PROCEDURE — 87186 SC STD MICRODIL/AGAR DIL: CPT

## 2021-07-23 PROCEDURE — 87070 CULTURE OTHR SPECIMN AEROBIC: CPT

## 2021-07-23 PROCEDURE — 87077 CULTURE AEROBIC IDENTIFY: CPT

## 2021-07-23 PROCEDURE — G0463: CPT

## 2021-07-25 LAB
-  AMPICILLIN/SULBACTAM: SIGNIFICANT CHANGE UP
-  CEFAZOLIN: SIGNIFICANT CHANGE UP
-  CLINDAMYCIN: SIGNIFICANT CHANGE UP
-  DAPTOMYCIN: SIGNIFICANT CHANGE UP
-  ERYTHROMYCIN: SIGNIFICANT CHANGE UP
-  GENTAMICIN: SIGNIFICANT CHANGE UP
-  LINEZOLID: SIGNIFICANT CHANGE UP
-  OXACILLIN: SIGNIFICANT CHANGE UP
-  PENICILLIN: SIGNIFICANT CHANGE UP
-  RIFAMPIN: SIGNIFICANT CHANGE UP
-  TETRACYCLINE: SIGNIFICANT CHANGE UP
-  TRIMETHOPRIM/SULFAMETHOXAZOLE: SIGNIFICANT CHANGE UP
-  VANCOMYCIN: SIGNIFICANT CHANGE UP
CULTURE RESULTS: SIGNIFICANT CHANGE UP
METHOD TYPE: SIGNIFICANT CHANGE UP
ORGANISM # SPEC MICROSCOPIC CNT: SIGNIFICANT CHANGE UP
ORGANISM # SPEC MICROSCOPIC CNT: SIGNIFICANT CHANGE UP
SPECIMEN SOURCE: SIGNIFICANT CHANGE UP

## 2021-07-26 ENCOUNTER — NON-APPOINTMENT (OUTPATIENT)
Age: 71
End: 2021-07-26

## 2021-07-27 ENCOUNTER — OUTPATIENT (OUTPATIENT)
Dept: OUTPATIENT SERVICES | Facility: HOSPITAL | Age: 71
LOS: 1 days | End: 2021-07-27
Payer: COMMERCIAL

## 2021-07-27 DIAGNOSIS — Z85.828 PERSONAL HISTORY OF OTHER MALIGNANT NEOPLASM OF SKIN: Chronic | ICD-10-CM

## 2021-07-27 DIAGNOSIS — L89.893 PRESSURE ULCER OF OTHER SITE, STAGE 3: ICD-10-CM

## 2021-07-27 PROCEDURE — 73630 X-RAY EXAM OF FOOT: CPT | Mod: 26,50

## 2021-07-27 PROCEDURE — 73630 X-RAY EXAM OF FOOT: CPT

## 2021-07-28 DIAGNOSIS — L89.893 PRESSURE ULCER OF OTHER SITE, STAGE 3: ICD-10-CM

## 2021-07-28 DIAGNOSIS — L84 CORNS AND CALLOSITIES: ICD-10-CM

## 2021-07-28 DIAGNOSIS — Z82.49 FAMILY HISTORY OF ISCHEMIC HEART DISEASE AND OTHER DISEASES OF THE CIRCULATORY SYSTEM: ICD-10-CM

## 2021-07-28 DIAGNOSIS — G62.9 POLYNEUROPATHY, UNSPECIFIED: ICD-10-CM

## 2021-07-28 DIAGNOSIS — I48.91 UNSPECIFIED ATRIAL FIBRILLATION: ICD-10-CM

## 2021-07-28 DIAGNOSIS — Z85.820 PERSONAL HISTORY OF MALIGNANT MELANOMA OF SKIN: ICD-10-CM

## 2021-07-28 DIAGNOSIS — Z85.828 PERSONAL HISTORY OF OTHER MALIGNANT NEOPLASM OF SKIN: ICD-10-CM

## 2021-07-28 DIAGNOSIS — Z84.0 FAMILY HISTORY OF DISEASES OF THE SKIN AND SUBCUTANEOUS TISSUE: ICD-10-CM

## 2021-07-28 DIAGNOSIS — Z79.899 OTHER LONG TERM (CURRENT) DRUG THERAPY: ICD-10-CM

## 2021-07-28 DIAGNOSIS — Z87.828 PERSONAL HISTORY OF OTHER (HEALED) PHYSICAL INJURY AND TRAUMA: ICD-10-CM

## 2021-07-28 DIAGNOSIS — Z87.81 PERSONAL HISTORY OF (HEALED) TRAUMATIC FRACTURE: ICD-10-CM

## 2021-07-28 DIAGNOSIS — I11.9 HYPERTENSIVE HEART DISEASE WITHOUT HEART FAILURE: ICD-10-CM

## 2021-07-28 NOTE — REVIEW OF SYSTEMS
[Fever] : no fever [Chills] : no chills [Eye Pain] : no eye pain [Loss Of Hearing] : no hearing loss [Shortness Of Breath] : no shortness of breath [Wheezing] : no wheezing [Abdominal Pain] : no abdominal pain [Vomiting] : no vomiting [Arthralgias] : arthralgias [Joint Stiffness] : joint stiffness [Skin Wound] : skin wound [Anxiety] : no anxiety [Easy Bleeding] : a tendency for easy bleeding [Easy Bruising] : a tendency for easy bruising [Negative] : Endocrine [FreeTextEntry5] : HTN , A-fib [FreeTextEntry9] : Hammer toes and forefoot deformity  [de-identified] : Stage 3 pressure ulcer plantar 5th met left  foot , skin, subcutaneous and fat  [de-identified] : peripheral neuropathy  [de-identified] : pt on Xarelto for A-fib

## 2021-07-28 NOTE — HISTORY OF PRESENT ILLNESS
[FreeTextEntry1] : Patient reports he tripped on a throw rug, tripped and hit his head on the door, and fractured his back and his neck on 5/19/21.Patient had surgery to his neck at [facility] on the same day 5/19/21. Patient was discharged from [facility] to [facility] in [place]. Patient reports he stayed at [facility] for 6 weeks. Patient reports the bed was too small, and he sustained a wound on his left foot. Patient reports the wound got infected during his stay, and needed IV ABX for 3 weeks. Patient was discharged from [facility] on 7/8/21. [facility] has been treating the patients wound. Patient is now wearing a back and neck brace. Stage 3 pressure ulcer plantar left 5th metatarsal , skin, subcutaneous and fat

## 2021-07-28 NOTE — ASSESSMENT
[Verbal] : Verbal [Demo] : Demo [Patient] : Patient [Good - alert, interested, motivated] : Good - alert, interested, motivated [Verbalizes knowledge/Understanding] : Verbalizes knowledge/understanding [Dressing changes] : dressing changes [Foot Care] : foot care [Skin Care] : skin care [Pressure relief] : pressure relief [Signs and symptoms of infection] : sign and symptoms of infection [How and When to Call] : how and when to call [Off-loading] : off-loading [Patient responsibility to plan of care] : patient responsibility to plan of care [] : Yes [Labs and Tests] : labs and tests [Stable] : stable [Home] : Home [Cane] : Cane [Faxed - Long Term Care/Home Health Agency] : Long Term Care/Home Health Agency: Faxed [FreeTextEntry3] : Initial visit [FreeTextEntry2] : Restore Skin Integrity\par Infection Control\par Localized wound care\par Maintain acceptable pain levels\par Offloading\par XRay\par Bone Scan [FreeTextEntry4] : Culture obtained and sent to lab\par Submitted pre auth for Xray and bone scan as per DPM\par Patient to f/u in 1 week [FreeTextEntry1] : [facility]

## 2021-07-28 NOTE — PHYSICAL EXAM
[Normal Breath Sounds] : Normal breath sounds [1+] : left 1+ [No Rash or Lesion] : No rash or lesion [Purpura] : no purpura  [Petechiae] : no petechiae [Skin Ulcer] : ulcer [Alert] : alert [Oriented to Person] : oriented to person [Oriented to Place] : oriented to place [Oriented to Time] : oriented to time [Calm] : calm [de-identified] : comfortable  [de-identified] : HTN , A-fib [de-identified] : forefoot deformity of the toes  [de-identified] : Stage 3 pressure ulcer plantar left 5th metatarsal , skin, subcutaneous and fat  [de-identified] : peripheral neuropathy  [de-identified] : DPM cauterized wound with AgN03\par DPM shaved callus [FreeTextEntry1] : Left lateral 5th metatarsal head [FreeTextEntry2] : 3.1 [FreeTextEntry3] : 2.5 [FreeTextEntry4] : 0.1 [de-identified] : serosanguineous [de-identified] : Intact/Callus [de-identified] : Silver Alginate [de-identified] : Cleansed with Normal Saline\par Cloth tape [de-identified] : Circulation:\par \par Dorsalis Pedis:   Bilateral palpable\par Posterior Tibialis:  Bilateral palpable\par Doppler Pulses:  N/A\par Extremity Color:  bilateral pink  \par Extremity Temperature:  bilateral warm\par Capillary Refill:  bilateral <3 sec  \par HOMERO:  Non-invasive vascular studies not ordered as per MD\par  [TWNoteComboBox4] : Small [TWNoteComboBox6] : Pressure [de-identified] : other [de-identified] : 100% [de-identified] : No [de-identified] : 3x Weekly

## 2021-07-28 NOTE — PLAN
[FreeTextEntry1] : Continue wound care , off loading and x rays and bone scan to r/o osteomyelitis of the the 5th metatarsal head  Spent 35 minutes for patient care and medical decision making.

## 2021-07-30 ENCOUNTER — OUTPATIENT (OUTPATIENT)
Dept: OUTPATIENT SERVICES | Facility: HOSPITAL | Age: 71
LOS: 1 days | Discharge: HOME | End: 2021-07-30
Payer: COMMERCIAL

## 2021-07-30 ENCOUNTER — APPOINTMENT (OUTPATIENT)
Dept: WOUND CARE | Facility: HOSPITAL | Age: 71
End: 2021-07-30
Payer: MEDICARE

## 2021-07-30 VITALS
WEIGHT: 254 LBS | HEIGHT: 63 IN | RESPIRATION RATE: 20 BRPM | OXYGEN SATURATION: 99 % | TEMPERATURE: 97.6 F | SYSTOLIC BLOOD PRESSURE: 132 MMHG | BODY MASS INDEX: 45 KG/M2 | DIASTOLIC BLOOD PRESSURE: 85 MMHG | HEART RATE: 99 BPM

## 2021-07-30 DIAGNOSIS — Z85.828 PERSONAL HISTORY OF OTHER MALIGNANT NEOPLASM OF SKIN: Chronic | ICD-10-CM

## 2021-07-30 DIAGNOSIS — S91.309A UNSPECIFIED OPEN WOUND, UNSPECIFIED FOOT, INITIAL ENCOUNTER: ICD-10-CM

## 2021-07-30 PROCEDURE — 99213 OFFICE O/P EST LOW 20 MIN: CPT

## 2021-07-30 PROCEDURE — G0463: CPT

## 2021-07-30 RX ORDER — DOXYCYCLINE HYCLATE 100 MG/1
100 CAPSULE ORAL
Qty: 30 | Refills: 2 | Status: COMPLETED | COMMUNITY
Start: 2021-07-30 | End: 2021-09-13

## 2021-07-30 NOTE — HISTORY OF PRESENT ILLNESS
[FreeTextEntry1] : Open ulcer lateral left 5th metatarsal head  + MRSA , x rays appear to have erosive changes consistent with OM , patient unable to lie down to do bone scan or MRI , patient placed on Doxycycline

## 2021-07-30 NOTE — REVIEW OF SYSTEMS
[Fever] : no fever [Chills] : no chills [Eye Pain] : no eye pain [Loss Of Hearing] : no hearing loss [Shortness Of Breath] : no shortness of breath [Wheezing] : no wheezing [Abdominal Pain] : no abdominal pain [Vomiting] : no vomiting [Arthralgias] : arthralgias [Joint Stiffness] : joint stiffness [Skin Wound] : skin wound [Anxiety] : no anxiety [Easy Bleeding] : a tendency for easy bleeding [Easy Bruising] : a tendency for easy bruising [Negative] : Endocrine [FreeTextEntry5] : HTN , A-fib [FreeTextEntry9] : Hammer toes and forefoot deformity  [de-identified] : Stage 3 pressure ulcer plantar 5th met left  foot , skin, subcutaneous and fat  [de-identified] : peripheral neuropathy  [de-identified] : pt on Xarelto for A-fib

## 2021-07-30 NOTE — ASSESSMENT
[Verbal] : Verbal [Patient] : Patient [Good - alert, interested, motivated] : Good - alert, interested, motivated [Verbalizes knowledge/Understanding] : Verbalizes knowledge/understanding [Dressing changes] : dressing changes [Foot Care] : foot care [Skin Care] : skin care [Pressure relief] : pressure relief [Signs and symptoms of infection] : sign and symptoms of infection [How and When to Call] : how and when to call [Labs and Tests] : labs and tests [Off-loading] : off-loading [Patient responsibility to plan of care] : patient responsibility to plan of care [Stable] : stable [Home] : Home [Cane] : Cane [Faxed - Long Term Care/Home Health Agency] : Long Term Care/Home Health Agency: Faxed [] : No [FreeTextEntry2] : Restore Skin Integrity\par Infection Control\par Localized wound care\par Maintain acceptable pain levels at satisfactory relief.\par Demonstrates use of both nonpharmacological and pharmacological pain relief strategies [FreeTextEntry3] : unchanged [FreeTextEntry4] : Photos Taken\par MD Reviewed and discussed Xray and Culture results, MD Recommended Consult with Dr. Noah WILLIAM, Started patient on oral ABT Doxycycline\par Patient stated unable to have MRI or Bone scan due to inability to lay on back for over 30 mins, Surgical Intervention Discussed, Patient stated understanding.\par F/U to [facility] in 1 week [FreeTextEntry1] : [facility]

## 2021-07-30 NOTE — PLAN
[FreeTextEntry1] : Patient to start Doxycycline , if ulcer doesn’t resolve it would be best to remove 5th metatarsal head . Patient has had OM in the past . continue wound care and off loading , APPT with Dr Zamora for ID consult PTR 1week Spent 20 minutes for patient care and medical decision making.\par

## 2021-07-30 NOTE — PHYSICAL EXAM
[Normal Breath Sounds] : Normal breath sounds [1+] : left 1+ [No Rash or Lesion] : No rash or lesion [Purpura] : no purpura  [Petechiae] : no petechiae [Skin Ulcer] : ulcer [Alert] : alert [Oriented to Person] : oriented to person [Oriented to Place] : oriented to place [Oriented to Time] : oriented to time [Calm] : calm [de-identified] : comfortable  [de-identified] : HTN , A-fib [de-identified] : forefoot deformity of the toes  [de-identified] : Stage 3 pressure ulcer plantar left 5th metatarsal , skin, subcutaneous and fat  [de-identified] : peripheral neuropathy  [FreeTextEntry1] : Left Lateral 5th Met Head [FreeTextEntry2] : 3.3 [FreeTextEntry3] : 2.4 [FreeTextEntry4] : 0.1 [de-identified] : Serosanguineous [de-identified] : slight maceration [de-identified] : 1-25% [de-identified] : Silver Alginate [de-identified] : Cleansed with Normal Saline\par  [TWNoteComboBox4] : Small [TWNoteComboBox5] : No [de-identified] : No [de-identified] : None [de-identified] : None [de-identified] : >75% [de-identified] : Yes [de-identified] : 3x Weekly

## 2021-08-01 DIAGNOSIS — Z85.828 PERSONAL HISTORY OF OTHER MALIGNANT NEOPLASM OF SKIN: ICD-10-CM

## 2021-08-01 DIAGNOSIS — G62.9 POLYNEUROPATHY, UNSPECIFIED: ICD-10-CM

## 2021-08-01 DIAGNOSIS — I11.9 HYPERTENSIVE HEART DISEASE WITHOUT HEART FAILURE: ICD-10-CM

## 2021-08-01 DIAGNOSIS — L89.893 PRESSURE ULCER OF OTHER SITE, STAGE 3: ICD-10-CM

## 2021-08-01 DIAGNOSIS — I48.91 UNSPECIFIED ATRIAL FIBRILLATION: ICD-10-CM

## 2021-08-01 DIAGNOSIS — Z87.828 PERSONAL HISTORY OF OTHER (HEALED) PHYSICAL INJURY AND TRAUMA: ICD-10-CM

## 2021-08-01 DIAGNOSIS — M86.9 OSTEOMYELITIS, UNSPECIFIED: ICD-10-CM

## 2021-08-01 DIAGNOSIS — Z79.899 OTHER LONG TERM (CURRENT) DRUG THERAPY: ICD-10-CM

## 2021-08-01 DIAGNOSIS — Z85.820 PERSONAL HISTORY OF MALIGNANT MELANOMA OF SKIN: ICD-10-CM

## 2021-08-01 DIAGNOSIS — K57.32 DIVERTICULITIS OF LARGE INTESTINE WITHOUT PERFORATION OR ABSCESS WITHOUT BLEEDING: ICD-10-CM

## 2021-08-01 DIAGNOSIS — L84 CORNS AND CALLOSITIES: ICD-10-CM

## 2021-08-01 DIAGNOSIS — Z82.49 FAMILY HISTORY OF ISCHEMIC HEART DISEASE AND OTHER DISEASES OF THE CIRCULATORY SYSTEM: ICD-10-CM

## 2021-08-01 DIAGNOSIS — Z84.0 FAMILY HISTORY OF DISEASES OF THE SKIN AND SUBCUTANEOUS TISSUE: ICD-10-CM

## 2021-08-03 ENCOUNTER — NON-APPOINTMENT (OUTPATIENT)
Age: 71
End: 2021-08-03

## 2021-08-03 ENCOUNTER — OUTPATIENT (OUTPATIENT)
Dept: OUTPATIENT SERVICES | Facility: HOSPITAL | Age: 71
LOS: 1 days | Discharge: HOME | End: 2021-08-03
Payer: COMMERCIAL

## 2021-08-03 ENCOUNTER — APPOINTMENT (OUTPATIENT)
Dept: WOUND CARE | Facility: HOSPITAL | Age: 71
End: 2021-08-03
Payer: MEDICARE

## 2021-08-03 DIAGNOSIS — Z85.828 PERSONAL HISTORY OF OTHER MALIGNANT NEOPLASM OF SKIN: Chronic | ICD-10-CM

## 2021-08-03 DIAGNOSIS — S91.309D UNSPECIFIED OPEN WOUND, UNSPECIFIED FOOT, SUBSEQUENT ENCOUNTER: ICD-10-CM

## 2021-08-03 DIAGNOSIS — A49.02 METHICILLIN RESISTANT STAPHYLOCOCCUS AUREUS INFECTION, UNSPECIFIED SITE: ICD-10-CM

## 2021-08-03 DIAGNOSIS — L03.119 CELLULITIS OF UNSPECIFIED PART OF LIMB: ICD-10-CM

## 2021-08-03 PROCEDURE — G0463: CPT

## 2021-08-03 PROCEDURE — ZZZZZ: CPT

## 2021-08-03 NOTE — HISTORY OF PRESENT ILLNESS
[FreeTextEntry1] : 70yo male with PMHx of neuropathy 2/2 back surgery, R. foot ulcer, melanoma s/p surgical excision, hypertension is here for left foot ulcer. \par He is known to me from his last year admission to [facility] for R foot ulcer that has healed. \par Now has an ulcre in left lateral foot on head of 5th metatarsal bone. \par Culture has been sent that showed MRSA infection, he has been started on doxycycline on 7/30 with minimal improvement. No fever or chills. \par No other symptoms. No side effect of meds.

## 2021-08-03 NOTE — ASSESSMENT
[FreeTextEntry1] : Since wound has not improved with doxycycline for 4-5days, will switch to zyvox, discussed about side effects and that needs labs next week. Bactrim is another choice if zyvox not approved. \par foods to be avoided especially cheese and wine. \par WIll check CBC next week for possible bone marrow suppression. \par Patient was given the opportunity to ask questions and all questions were answered to their satisfaction.\par Counseling included lab results, differential diagnosis, treatment options, risks and benefits, lifestyle changes, current condition, medications and dose adjustments.\par The patient verbalized understanding.\par  [Treatment Education] : treatment education [Treatment Adherence] : treatment adherence [Rx Dose / Side Effects] : Rx dose/side effects [Risk Reduction] : risk reduction [Universal Precautions] : universal precautions [Drug Interactions / Side Effects] : drug interactions/side effects [Anticipatory Guidance] : anticipatory guidance

## 2021-08-04 ENCOUNTER — NON-APPOINTMENT (OUTPATIENT)
Age: 71
End: 2021-08-04

## 2021-08-04 DIAGNOSIS — M86.9 OSTEOMYELITIS, UNSPECIFIED: ICD-10-CM

## 2021-08-04 DIAGNOSIS — L89.893 PRESSURE ULCER OF OTHER SITE, STAGE 3: ICD-10-CM

## 2021-08-05 ENCOUNTER — NON-APPOINTMENT (OUTPATIENT)
Age: 71
End: 2021-08-05

## 2021-08-06 ENCOUNTER — NON-APPOINTMENT (OUTPATIENT)
Age: 71
End: 2021-08-06

## 2021-08-10 ENCOUNTER — NON-APPOINTMENT (OUTPATIENT)
Age: 71
End: 2021-08-10

## 2021-08-10 ENCOUNTER — APPOINTMENT (OUTPATIENT)
Dept: WOUND CARE | Facility: HOSPITAL | Age: 71
End: 2021-08-10
Payer: MEDICARE

## 2021-08-10 ENCOUNTER — OUTPATIENT (OUTPATIENT)
Dept: OUTPATIENT SERVICES | Facility: HOSPITAL | Age: 71
LOS: 1 days | Discharge: HOME | End: 2021-08-10
Payer: COMMERCIAL

## 2021-08-10 DIAGNOSIS — L89.893 PRESSURE ULCER OF OTHER SITE, STAGE 3: ICD-10-CM

## 2021-08-10 DIAGNOSIS — M86.9 OSTEOMYELITIS, UNSPECIFIED: ICD-10-CM

## 2021-08-10 DIAGNOSIS — Z85.828 PERSONAL HISTORY OF OTHER MALIGNANT NEOPLASM OF SKIN: Chronic | ICD-10-CM

## 2021-08-10 PROCEDURE — G0463: CPT

## 2021-08-10 PROCEDURE — 99213 OFFICE O/P EST LOW 20 MIN: CPT

## 2021-08-10 NOTE — PHYSICAL EXAM
[General Appearance - Alert] : alert [General Appearance - In No Acute Distress] : in no acute distress [Sclera] : the sclera and conjunctiva were normal [PERRL With Normal Accommodation] : pupils were equal in size, round, reactive to light [Extraocular Movements] : extraocular movements were intact [Outer Ear] : the ears and nose were normal in appearance [Oropharynx] : the oropharynx was normal with no thrush [Neck Appearance] : the appearance of the neck was normal [Neck Cervical Mass (___cm)] : no neck mass was observed [Jugular Venous Distention Increased] : there was no jugular-venous distention [Thyroid Diffuse Enlargement] : the thyroid was not enlarged [Auscultation Breath Sounds / Voice Sounds] : lungs were clear to auscultation bilaterally [Heart Rate And Rhythm] : heart rate was normal and rhythm regular [Heart Sounds] : normal S1 and S2 [Heart Sounds Gallop] : no gallops [Murmurs] : no murmurs [Heart Sounds Pericardial Friction Rub] : no pericardial rub [Full Pulse] : the pedal pulses are present [Edema] : there was no peripheral edema [Bowel Sounds] : normal bowel sounds [Abdomen Soft] : soft [Abdomen Tenderness] : non-tender [] : no hepato-splenomegaly [Abdomen Mass (___ Cm)] : no abdominal mass palpated [Costovertebral Angle Tenderness] : no CVA tenderness [No Palpable Adenopathy] : no palpable adenopathy [Deep Tendon Reflexes (DTR)] : deep tendon reflexes were 2+ and symmetric [Sensation] : the sensory exam was normal to light touch and pinprick [No Focal Deficits] : no focal deficits [FreeTextEntry1] : neuropathy in feet  [Oriented To Time, Place, And Person] : oriented to person, place, and time [Affect] : the affect was normal

## 2021-08-10 NOTE — ASSESSMENT
[FreeTextEntry1] : Will continue Linezolid bid, will see him next friday. Will do CBC on 8/13 since on linzolid. \par Looks like this antibiotic is working. \par Elevate legs\par Offloading\par Wound care\par \par Patient was given the opportunity to ask questions and all questions were answered to their satisfaction.\par Counseling included lab results, differential diagnosis, treatment options, risks and benefits, lifestyle changes, current condition, medications and dose adjustments.\par The patient verbalized understanding.\par  [Treatment Education] : treatment education [Treatment Adherence] : treatment adherence [Rx Dose / Side Effects] : Rx dose/side effects [Risk Reduction] : risk reduction [Nutritional / Food Issues] : nutritional/food issues [Universal Precautions] : universal precautions [Drug Interactions / Side Effects] : drug interactions/side effects [Anticipatory Guidance] : anticipatory guidance

## 2021-08-10 NOTE — HISTORY OF PRESENT ILLNESS
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 foot ulcer, melanoma s/p surgical excision, hypertension is here for left foot ulcer. \par He is known to me from his last year admission to [facility] for R foot ulcer that has healed. \par Now has an ulcre in left lateral foot on head of 5th metatarsal bone. \par Culture has been sent that showed MRSA infection, he has been started on doxycycline on 7/30 with minimal improvement. No fever or chills. \par No other symptoms. No side effect of meds. \par \par 8/10/21\par Seen for follow up. Linezolid was approved so he started on 8/6 night dose, so has taken about 3 days. But even with 3 days that I feel wound is  with less discharge and less erythema. No pain, no fever or chills.\par No other complaint, no side effect of meds.

## 2021-08-11 VITALS
BODY MASS INDEX: 45 KG/M2 | HEIGHT: 63 IN | OXYGEN SATURATION: 96 % | DIASTOLIC BLOOD PRESSURE: 72 MMHG | WEIGHT: 254 LBS | SYSTOLIC BLOOD PRESSURE: 106 MMHG | TEMPERATURE: 98.2 F | HEART RATE: 91 BPM | RESPIRATION RATE: 18 BRPM

## 2021-08-11 DIAGNOSIS — G62.9 POLYNEUROPATHY, UNSPECIFIED: ICD-10-CM

## 2021-08-11 DIAGNOSIS — Z79.899 OTHER LONG TERM (CURRENT) DRUG THERAPY: ICD-10-CM

## 2021-08-11 DIAGNOSIS — I11.9 HYPERTENSIVE HEART DISEASE WITHOUT HEART FAILURE: ICD-10-CM

## 2021-08-11 DIAGNOSIS — Z85.828 PERSONAL HISTORY OF OTHER MALIGNANT NEOPLASM OF SKIN: ICD-10-CM

## 2021-08-11 DIAGNOSIS — M86.672 OTHER CHRONIC OSTEOMYELITIS, LEFT ANKLE AND FOOT: ICD-10-CM

## 2021-08-11 DIAGNOSIS — L89.893 PRESSURE ULCER OF OTHER SITE, STAGE 3: ICD-10-CM

## 2021-08-11 DIAGNOSIS — K57.32 DIVERTICULITIS OF LARGE INTESTINE WITHOUT PERFORATION OR ABSCESS WITHOUT BLEEDING: ICD-10-CM

## 2021-08-11 DIAGNOSIS — L84 CORNS AND CALLOSITIES: ICD-10-CM

## 2021-08-11 DIAGNOSIS — I48.91 UNSPECIFIED ATRIAL FIBRILLATION: ICD-10-CM

## 2021-08-11 DIAGNOSIS — Z82.49 FAMILY HISTORY OF ISCHEMIC HEART DISEASE AND OTHER DISEASES OF THE CIRCULATORY SYSTEM: ICD-10-CM

## 2021-08-11 DIAGNOSIS — Z85.820 PERSONAL HISTORY OF MALIGNANT MELANOMA OF SKIN: ICD-10-CM

## 2021-08-11 DIAGNOSIS — Z87.828 PERSONAL HISTORY OF OTHER (HEALED) PHYSICAL INJURY AND TRAUMA: ICD-10-CM

## 2021-08-11 DIAGNOSIS — Z84.0 FAMILY HISTORY OF DISEASES OF THE SKIN AND SUBCUTANEOUS TISSUE: ICD-10-CM

## 2021-08-24 ENCOUNTER — APPOINTMENT (OUTPATIENT)
Dept: WOUND CARE | Facility: HOSPITAL | Age: 71
End: 2021-08-24
Payer: MEDICARE

## 2021-08-24 ENCOUNTER — OUTPATIENT (OUTPATIENT)
Dept: OUTPATIENT SERVICES | Facility: HOSPITAL | Age: 71
LOS: 1 days | Discharge: HOME | End: 2021-08-24
Payer: COMMERCIAL

## 2021-08-24 VITALS
BODY MASS INDEX: 27.35 KG/M2 | TEMPERATURE: 98.2 F | WEIGHT: 220 LBS | SYSTOLIC BLOOD PRESSURE: 123 MMHG | OXYGEN SATURATION: 97 % | HEIGHT: 75 IN | RESPIRATION RATE: 18 BRPM | HEART RATE: 90 BPM | DIASTOLIC BLOOD PRESSURE: 75 MMHG

## 2021-08-24 DIAGNOSIS — Z85.828 PERSONAL HISTORY OF OTHER MALIGNANT NEOPLASM OF SKIN: Chronic | ICD-10-CM

## 2021-08-24 DIAGNOSIS — M86.672 OTHER CHRONIC OSTEOMYELITIS, LEFT ANKLE AND FOOT: ICD-10-CM

## 2021-08-24 PROCEDURE — 99213 OFFICE O/P EST LOW 20 MIN: CPT

## 2021-08-24 PROCEDURE — G0463: CPT

## 2021-08-25 DIAGNOSIS — G62.9 POLYNEUROPATHY, UNSPECIFIED: ICD-10-CM

## 2021-08-25 DIAGNOSIS — Z84.0 FAMILY HISTORY OF DISEASES OF THE SKIN AND SUBCUTANEOUS TISSUE: ICD-10-CM

## 2021-08-25 DIAGNOSIS — L89.893 PRESSURE ULCER OF OTHER SITE, STAGE 3: ICD-10-CM

## 2021-08-25 DIAGNOSIS — Z85.828 PERSONAL HISTORY OF OTHER MALIGNANT NEOPLASM OF SKIN: ICD-10-CM

## 2021-08-25 DIAGNOSIS — Z79.899 OTHER LONG TERM (CURRENT) DRUG THERAPY: ICD-10-CM

## 2021-08-25 DIAGNOSIS — I11.9 HYPERTENSIVE HEART DISEASE WITHOUT HEART FAILURE: ICD-10-CM

## 2021-08-25 DIAGNOSIS — L84 CORNS AND CALLOSITIES: ICD-10-CM

## 2021-08-25 DIAGNOSIS — K57.32 DIVERTICULITIS OF LARGE INTESTINE WITHOUT PERFORATION OR ABSCESS WITHOUT BLEEDING: ICD-10-CM

## 2021-08-25 DIAGNOSIS — Z87.828 PERSONAL HISTORY OF OTHER (HEALED) PHYSICAL INJURY AND TRAUMA: ICD-10-CM

## 2021-08-25 DIAGNOSIS — Z82.49 FAMILY HISTORY OF ISCHEMIC HEART DISEASE AND OTHER DISEASES OF THE CIRCULATORY SYSTEM: ICD-10-CM

## 2021-08-25 DIAGNOSIS — I48.91 UNSPECIFIED ATRIAL FIBRILLATION: ICD-10-CM

## 2021-08-25 DIAGNOSIS — M86.672 OTHER CHRONIC OSTEOMYELITIS, LEFT ANKLE AND FOOT: ICD-10-CM

## 2021-08-25 DIAGNOSIS — Z85.820 PERSONAL HISTORY OF MALIGNANT MELANOMA OF SKIN: ICD-10-CM

## 2021-08-25 NOTE — PHYSICAL EXAM
[4 x 4] : 4 x 4  [Normal Breath Sounds] : Normal breath sounds [1+] : left 1+ [No Rash or Lesion] : No rash or lesion [Purpura] : no purpura  [Petechiae] : no petechiae [Skin Ulcer] : ulcer [Alert] : alert [Oriented to Person] : oriented to person [Oriented to Place] : oriented to place [Oriented to Time] : oriented to time [Calm] : calm [de-identified] : comfortable  [de-identified] : HTN , A-fib [de-identified] : forefoot deformity of the toes , + OM on x ray findings  [de-identified] : Stage 3 pressure ulcer plantar left 5th metatarsal , skin, subcutaneous and fat  [de-identified] : peripheral neuropathy  [de-identified] : silver nitrate utilized to cauterize hypergranular tissue by DPM [FreeTextEntry1] : Left Lateral 5th Met Head [FreeTextEntry2] : 1.7 [FreeTextEntry3] : 1.7 [FreeTextEntry4] : 0.1 [de-identified] : Serosanguineous [de-identified] : callus- removed [de-identified] : 100% [de-identified] : Cleansed with Normal Saline\par cloth tape\par  [de-identified] : Silver Alginate [TWNoteComboBox4] : Moderate [TWNoteComboBox5] : No [de-identified] : No [de-identified] : other [de-identified] : None [de-identified] : None [de-identified] : 100% [de-identified] : Yes [de-identified] : Primary Dressing [de-identified] : 3x Weekly

## 2021-08-25 NOTE — HISTORY OF PRESENT ILLNESS
[FreeTextEntry1] : Pressure ulcer lateral left head 5th metatarsal , clean , granular and superficial , patient on Zyvox

## 2021-08-25 NOTE — REVIEW OF SYSTEMS
[Fever] : no fever [Chills] : no chills [Eye Pain] : no eye pain [Loss Of Hearing] : no hearing loss [Shortness Of Breath] : no shortness of breath [Wheezing] : no wheezing [Abdominal Pain] : no abdominal pain [Vomiting] : no vomiting [Arthralgias] : arthralgias [Joint Stiffness] : joint stiffness [Skin Wound] : skin wound [Anxiety] : no anxiety [Easy Bleeding] : a tendency for easy bleeding [Easy Bruising] : a tendency for easy bruising [Negative] : Endocrine [FreeTextEntry5] : HTN , A-fib [FreeTextEntry9] : Hammer toes and forefoot deformity  [de-identified] : Stage 3 pressure ulcer plantar 5th met left  foot , skin, subcutaneous and fat  [de-identified] : peripheral neuropathy  [de-identified] : pt on Xarelto for A-fib

## 2021-08-25 NOTE — PLAN
[FreeTextEntry1] : continue wound care , finish Zyvox, patient is still subject to surgical intervention for OM of the 5th metatarsal head left foot   Spent 20 minutes for patient care and medical decision making. Patient understands \par

## 2021-08-25 NOTE — ASSESSMENT
[Verbal] : Verbal [Patient] : Patient [Good - alert, interested, motivated] : Good - alert, interested, motivated [Verbalizes knowledge/Understanding] : Verbalizes knowledge/understanding [Dressing changes] : dressing changes [Foot Care] : foot care [Skin Care] : skin care [Pressure relief] : pressure relief [Signs and symptoms of infection] : sign and symptoms of infection [Nutrition] : nutrition [How and When to Call] : how and when to call [Off-loading] : off-loading [Home Health] : home health [Patient responsibility to plan of care] : patient responsibility to plan of care [] : Yes [Stable] : stable [Home] : Home [Cane] : Cane [FreeTextEntry2] : Restore Skin Integrity\par Infection Control\par Localized wound care\par Maintain acceptable pain levels at satisfactory relief.\par Demonstrates use of both nonpharmacological and pharmacological pain relief strategies [FreeTextEntry4] : Pt to F/U to [facility] in 2 weeks for Assessment. Pt to see Dr. Zamora during next assessment.\par  [FreeTextEntry1] : [facility]

## 2021-08-31 ENCOUNTER — OUTPATIENT (OUTPATIENT)
Dept: OUTPATIENT SERVICES | Facility: HOSPITAL | Age: 71
LOS: 1 days | Discharge: HOME | End: 2021-08-31
Payer: COMMERCIAL

## 2021-08-31 ENCOUNTER — APPOINTMENT (OUTPATIENT)
Dept: WOUND CARE | Facility: HOSPITAL | Age: 71
End: 2021-08-31
Payer: MEDICARE

## 2021-08-31 ENCOUNTER — NON-APPOINTMENT (OUTPATIENT)
Age: 71
End: 2021-08-31

## 2021-08-31 VITALS
HEART RATE: 103 BPM | BODY MASS INDEX: 27.35 KG/M2 | WEIGHT: 220 LBS | TEMPERATURE: 97.9 F | SYSTOLIC BLOOD PRESSURE: 114 MMHG | OXYGEN SATURATION: 99 % | DIASTOLIC BLOOD PRESSURE: 71 MMHG | HEIGHT: 75 IN

## 2021-08-31 DIAGNOSIS — M86.9 OSTEOMYELITIS, UNSPECIFIED: ICD-10-CM

## 2021-08-31 DIAGNOSIS — Z85.828 PERSONAL HISTORY OF OTHER MALIGNANT NEOPLASM OF SKIN: Chronic | ICD-10-CM

## 2021-08-31 PROCEDURE — ZZZZZ: CPT

## 2021-08-31 PROCEDURE — G0463: CPT

## 2021-08-31 NOTE — HISTORY OF PRESENT ILLNESS
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 foot ulcer, melanoma s/p surgical excision, hypertension is here for left foot ulcer. \par He is known to me from his last year admission to [facility] for R foot ulcer that has healed. \par Now has an ulcre in left lateral foot on head of 5th metatarsal bone. \par Culture has been sent that showed MRSA infection, he has been started on doxycycline on 7/30 with minimal improvement. No fever or chills. \par No other symptoms. No side effect of meds. \par \par 8/10/21\par Seen for follow up. Linezolid was approved so he started on 8/6 night dose, so has taken about 3 days. But even with 3 days that I feel wound is  with less discharge and less erythema. No pain, no fever or chills.\par No other complaint, no side effect of meds. \par \par 8/31:\par He is here today for follow up. Wound in Left lateral foot. No pain, no fever.\par Wound is smaller, no discharge. Completed ABx, culture was MRSA. \par At this time wound about 1/2 size with no cellulitis. No further Antibiotics at this time. \par Completed Linezolid.

## 2021-08-31 NOTE — PHYSICAL EXAM
[General Appearance - In No Acute Distress] : in no acute distress [General Appearance - Alert] : alert [Sclera] : the sclera and conjunctiva were normal [PERRL With Normal Accommodation] : pupils were equal in size, round, reactive to light [Extraocular Movements] : extraocular movements were intact [Outer Ear] : the ears and nose were normal in appearance [Oropharynx] : the oropharynx was normal with no thrush [Neck Appearance] : the appearance of the neck was normal [Neck Cervical Mass (___cm)] : no neck mass was observed [Jugular Venous Distention Increased] : there was no jugular-venous distention [Thyroid Diffuse Enlargement] : the thyroid was not enlarged [Auscultation Breath Sounds / Voice Sounds] : lungs were clear to auscultation bilaterally [Heart Rate And Rhythm] : heart rate was normal and rhythm regular [Heart Sounds] : normal S1 and S2 [Heart Sounds Gallop] : no gallops [Murmurs] : no murmurs [Heart Sounds Pericardial Friction Rub] : no pericardial rub [Full Pulse] : the pedal pulses are present [Edema] : there was no peripheral edema [Bowel Sounds] : normal bowel sounds [Abdomen Soft] : soft [Abdomen Tenderness] : non-tender [Abdomen Mass (___ Cm)] : no abdominal mass palpated [] : no hepato-splenomegaly [Costovertebral Angle Tenderness] : no CVA tenderness [No Palpable Adenopathy] : no palpable adenopathy [Deep Tendon Reflexes (DTR)] : deep tendon reflexes were 2+ and symmetric [Sensation] : the sensory exam was normal to light touch and pinprick [No Focal Deficits] : no focal deficits [FreeTextEntry1] : neuropathy in feet  [Oriented To Time, Place, And Person] : oriented to person, place, and time [Affect] : the affect was normal

## 2021-08-31 NOTE — ASSESSMENT
[Universal Precautions] : universal precautions [Risk Reduction] : risk reduction [Anticipatory Guidance] : anticipatory guidance [FreeTextEntry1] : Elevate legs\par Offloading\par Wound care\par No more Antibiotics\par Patient was given the opportunity to ask questions and all questions were answered to their satisfaction.\par Counseling included lab results, differential diagnosis, treatment options, risks and benefits, lifestyle changes, current condition, medications and dose adjustments.\par The patient verbalized understanding.\par

## 2021-09-07 DIAGNOSIS — L89.893 PRESSURE ULCER OF OTHER SITE, STAGE 3: ICD-10-CM

## 2021-09-07 DIAGNOSIS — Z87.828 PERSONAL HISTORY OF OTHER (HEALED) PHYSICAL INJURY AND TRAUMA: ICD-10-CM

## 2021-09-07 DIAGNOSIS — N40.0 BENIGN PROSTATIC HYPERPLASIA WITHOUT LOWER URINARY TRACT SYMPTOMS: ICD-10-CM

## 2021-09-07 DIAGNOSIS — K57.32 DIVERTICULITIS OF LARGE INTESTINE WITHOUT PERFORATION OR ABSCESS WITHOUT BLEEDING: ICD-10-CM

## 2021-09-07 DIAGNOSIS — Z85.820 PERSONAL HISTORY OF MALIGNANT MELANOMA OF SKIN: ICD-10-CM

## 2021-09-07 DIAGNOSIS — G62.9 POLYNEUROPATHY, UNSPECIFIED: ICD-10-CM

## 2021-09-07 DIAGNOSIS — Z79.899 OTHER LONG TERM (CURRENT) DRUG THERAPY: ICD-10-CM

## 2021-09-07 DIAGNOSIS — I11.9 HYPERTENSIVE HEART DISEASE WITHOUT HEART FAILURE: ICD-10-CM

## 2021-09-07 DIAGNOSIS — A49.02 METHICILLIN RESISTANT STAPHYLOCOCCUS AUREUS INFECTION, UNSPECIFIED SITE: ICD-10-CM

## 2021-09-08 ENCOUNTER — APPOINTMENT (OUTPATIENT)
Dept: WOUND CARE | Facility: HOSPITAL | Age: 71
End: 2021-09-08
Payer: MEDICARE

## 2021-09-08 ENCOUNTER — OUTPATIENT (OUTPATIENT)
Dept: OUTPATIENT SERVICES | Facility: HOSPITAL | Age: 71
LOS: 1 days | Discharge: HOME | End: 2021-09-08
Payer: COMMERCIAL

## 2021-09-08 VITALS
HEIGHT: 75 IN | OXYGEN SATURATION: 99 % | BODY MASS INDEX: 27.35 KG/M2 | RESPIRATION RATE: 18 BRPM | SYSTOLIC BLOOD PRESSURE: 138 MMHG | DIASTOLIC BLOOD PRESSURE: 90 MMHG | WEIGHT: 220 LBS | HEART RATE: 74 BPM | TEMPERATURE: 97.1 F

## 2021-09-08 DIAGNOSIS — Z85.828 PERSONAL HISTORY OF OTHER MALIGNANT NEOPLASM OF SKIN: Chronic | ICD-10-CM

## 2021-09-08 DIAGNOSIS — M86.9 OSTEOMYELITIS, UNSPECIFIED: ICD-10-CM

## 2021-09-08 PROCEDURE — G0463: CPT

## 2021-09-08 PROCEDURE — 99213 OFFICE O/P EST LOW 20 MIN: CPT

## 2021-09-09 DIAGNOSIS — Z79.899 OTHER LONG TERM (CURRENT) DRUG THERAPY: ICD-10-CM

## 2021-09-09 DIAGNOSIS — Z85.828 PERSONAL HISTORY OF OTHER MALIGNANT NEOPLASM OF SKIN: ICD-10-CM

## 2021-09-09 DIAGNOSIS — G62.9 POLYNEUROPATHY, UNSPECIFIED: ICD-10-CM

## 2021-09-09 DIAGNOSIS — M86.672 OTHER CHRONIC OSTEOMYELITIS, LEFT ANKLE AND FOOT: ICD-10-CM

## 2021-09-09 DIAGNOSIS — Z87.828 PERSONAL HISTORY OF OTHER (HEALED) PHYSICAL INJURY AND TRAUMA: ICD-10-CM

## 2021-09-09 DIAGNOSIS — Z84.0 FAMILY HISTORY OF DISEASES OF THE SKIN AND SUBCUTANEOUS TISSUE: ICD-10-CM

## 2021-09-09 DIAGNOSIS — L84 CORNS AND CALLOSITIES: ICD-10-CM

## 2021-09-09 DIAGNOSIS — I48.91 UNSPECIFIED ATRIAL FIBRILLATION: ICD-10-CM

## 2021-09-09 DIAGNOSIS — I11.9 HYPERTENSIVE HEART DISEASE WITHOUT HEART FAILURE: ICD-10-CM

## 2021-09-09 DIAGNOSIS — L89.893 PRESSURE ULCER OF OTHER SITE, STAGE 3: ICD-10-CM

## 2021-09-09 DIAGNOSIS — Z82.49 FAMILY HISTORY OF ISCHEMIC HEART DISEASE AND OTHER DISEASES OF THE CIRCULATORY SYSTEM: ICD-10-CM

## 2021-09-09 DIAGNOSIS — Z85.820 PERSONAL HISTORY OF MALIGNANT MELANOMA OF SKIN: ICD-10-CM

## 2021-09-09 NOTE — PLAN
[FreeTextEntry1] : As per ID patient to continue antibiotic therapy , wound care , off loading , Patient still high risk for surgical intervention   Spent 20 minutes for patient care and medical decision making.\par

## 2021-09-09 NOTE — PHYSICAL EXAM
[4 x 4] : 4 x 4  [Normal Breath Sounds] : Normal breath sounds [1+] : left 1+ [No Rash or Lesion] : No rash or lesion [Skin Ulcer] : ulcer [Alert] : alert [Oriented to Person] : oriented to person [Oriented to Place] : oriented to place [Oriented to Time] : oriented to time [Calm] : calm [Purpura] : no purpura  [Petechiae] : no petechiae [de-identified] : comfortable  [de-identified] : HTN , A-fib [de-identified] : forefoot deformity of the toes , + OM on x ray findings  [de-identified] : Stage 3 pressure ulcer plantar left 5th metatarsal , skin, subcutaneous and fat  [de-identified] : peripheral neuropathy  [de-identified] : silver nitrate utilized to cauterize hypergranular tissue by DPM [FreeTextEntry1] : Left Lateral 5th Met Head [FreeTextEntry2] : 3.0 [FreeTextEntry3] : 2.0 [FreeTextEntry4] : hypergranulation [de-identified] : Serosanguineous [de-identified] : mild maceration/callus [de-identified] : 75% [de-identified] : 25% [de-identified] : Silver Alginate [TWNoteComboBox4] : Moderate [de-identified] : Cleansed with Normal Saline\par cloth tape\par  [TWNoteComboBox5] : No [de-identified] : No [de-identified] : None [de-identified] : None [de-identified] : >75% [de-identified] : Yes [de-identified] : 3x Weekly [de-identified] : Primary Dressing

## 2021-09-09 NOTE — ASSESSMENT
[Verbal] : Verbal [Patient] : Patient [Good - alert, interested, motivated] : Good - alert, interested, motivated [Verbalizes knowledge/Understanding] : Verbalizes knowledge/understanding [Dressing changes] : dressing changes [Foot Care] : foot care [Skin Care] : skin care [Pressure relief] : pressure relief [Signs and symptoms of infection] : sign and symptoms of infection [Nutrition] : nutrition [How and When to Call] : how and when to call [Off-loading] : off-loading [Home Health] : home health [Patient responsibility to plan of care] : patient responsibility to plan of care [] : Yes [Stable] : stable [Home] : Home [Cane] : Cane [Faxed - Long Term Care/Home Health Agency] : Long Term Care/Home Health Agency: Faxed [FreeTextEntry2] : Restore Skin Integrity\par Infection Control\par Localized wound care\par Maintain acceptable pain levels at satisfactory relief.\par Demonstrates use of both nonpharmacological and pharmacological pain relief strategies [FreeTextEntry4] : F/U 2 weeks for assessment\par Pt continues taking Zyvox  as per ID Dr. Zamora, pt started antibiotic therapy on 8/6/21\par CBC ordered by Dr. Zamora (Infectious Disease), pt to see Dr. Zamora in 2 weeks during next assessment. Pt was provided with script.\par Due to IT error V/S were not input into note. Regardless, V/S were within normal limits.\par  [FreeTextEntry1] : [facility]

## 2021-09-09 NOTE — ASSESSMENT
[Verbal] : Verbal [Patient] : Patient [Good - alert, interested, motivated] : Good - alert, interested, motivated [Verbalizes knowledge/Understanding] : Verbalizes knowledge/understanding [Dressing changes] : dressing changes [Foot Care] : foot care [Skin Care] : skin care [Signs and symptoms of infection] : sign and symptoms of infection [Pressure relief] : pressure relief [Nutrition] : nutrition [How and When to Call] : how and when to call [Off-loading] : off-loading [Home Health] : home health [Patient responsibility to plan of care] : patient responsibility to plan of care [] : Yes [Stable] : stable [Home] : Home [Demo] : Demo [Ambulatory] : Ambulatory [Faxed - Long Term Care/Home Health Agency] : Long Term Care/Home Health Agency: Faxed [FreeTextEntry2] : Localized wound care\par Infection Prevention \par Restore Optimal Skin Integrity \par Offloading/Pressure Relief  [FreeTextEntry4] : Pt to F/U to [facility] in 1 Week \par  [FreeTextEntry1] : [facility]

## 2021-09-09 NOTE — REVIEW OF SYSTEMS
[Fever] : no fever [Chills] : no chills [Eye Pain] : no eye pain [Loss Of Hearing] : no hearing loss [Shortness Of Breath] : no shortness of breath [Wheezing] : no wheezing [Abdominal Pain] : no abdominal pain [Vomiting] : no vomiting [Arthralgias] : arthralgias [Joint Stiffness] : joint stiffness [Skin Wound] : skin wound [Anxiety] : no anxiety [Easy Bleeding] : a tendency for easy bleeding [Easy Bruising] : a tendency for easy bruising [Negative] : Endocrine [FreeTextEntry5] : HTN , A-fib [FreeTextEntry9] : Hammer toes and forefoot deformity  [de-identified] : Stage 3 pressure ulcer plantar 5th met left  foot , skin, subcutaneous and fat , smaller and clean  [de-identified] : peripheral neuropathy  [de-identified] : pt on Xarelto for A-fib

## 2021-09-09 NOTE — HISTORY OF PRESENT ILLNESS
[FreeTextEntry1] : Stage 3 pressure ulcer 5th metatarsal left foot , ulcer smaller , clean , but there is some swelling around the 5th metatarsal head , patient may still need surgical intervention and he stopped Zyvox several days ago .

## 2021-09-09 NOTE — PHYSICAL EXAM
[4 x 4] : 4 x 4  [Normal Breath Sounds] : Normal breath sounds [1+] : left 1+ [No Rash or Lesion] : No rash or lesion [Purpura] : no purpura  [Petechiae] : no petechiae [Skin Ulcer] : ulcer [Alert] : alert [Oriented to Person] : oriented to person [Oriented to Place] : oriented to place [Oriented to Time] : oriented to time [Calm] : calm [de-identified] : comfortable  [de-identified] : forefoot deformity of the toes , + OM on x ray findings  [de-identified] : HTN , A-fib [de-identified] : Stage 3 pressure ulcer plantar left 5th metatarsal , skin, subcutaneous and fat  [de-identified] : peripheral neuropathy  [FreeTextEntry1] : Left Lateral 5th Met Head [FreeTextEntry2] : 1.1 [FreeTextEntry3] : 0.6 [FreeTextEntry4] : 0.1 [de-identified] : Serosanguineous [de-identified] : callus- removed [de-identified] : Silver Alginate [de-identified] : Cleansed with Normal Saline\par cloth tape\par  [TWNoteComboBox4] : Moderate [de-identified] : other [de-identified] : None [de-identified] : None [de-identified] : 100% [de-identified] : 3x Weekly [de-identified] : Yes [de-identified] : Primary Dressing

## 2021-09-09 NOTE — PLAN
[FreeTextEntry1] : continue wound care , off loading , observation , PTR 1 week  Spent 20 minutes for patient care and medical decision making.\par

## 2021-09-09 NOTE — REVIEW OF SYSTEMS
[Arthralgias] : arthralgias [Joint Stiffness] : joint stiffness [Skin Wound] : skin wound [Easy Bleeding] : a tendency for easy bleeding [Easy Bruising] : a tendency for easy bruising [Negative] : Endocrine [Fever] : no fever [Chills] : no chills [Eye Pain] : no eye pain [Loss Of Hearing] : no hearing loss [Shortness Of Breath] : no shortness of breath [Wheezing] : no wheezing [Abdominal Pain] : no abdominal pain [Vomiting] : no vomiting [Anxiety] : no anxiety [FreeTextEntry5] : HTN , A-fib [FreeTextEntry9] : Hammer toes and forefoot deformity  [de-identified] : Stage 3 pressure ulcer plantar 5th met left  foot , skin, subcutaneous and fat  [de-identified] : peripheral neuropathy  [de-identified] : pt on Xarelto for A-fib

## 2021-09-10 ENCOUNTER — NON-APPOINTMENT (OUTPATIENT)
Age: 71
End: 2021-09-10

## 2021-09-10 ENCOUNTER — RX RENEWAL (OUTPATIENT)
Age: 71
End: 2021-09-10

## 2021-09-10 ENCOUNTER — APPOINTMENT (OUTPATIENT)
Dept: CARDIOLOGY | Facility: CLINIC | Age: 71
End: 2021-09-10
Payer: MEDICARE

## 2021-09-10 VITALS
HEART RATE: 86 BPM | HEIGHT: 75 IN | DIASTOLIC BLOOD PRESSURE: 83 MMHG | WEIGHT: 228 LBS | BODY MASS INDEX: 28.35 KG/M2 | OXYGEN SATURATION: 98 % | SYSTOLIC BLOOD PRESSURE: 124 MMHG

## 2021-09-10 DIAGNOSIS — R06.00 DYSPNEA, UNSPECIFIED: ICD-10-CM

## 2021-09-10 PROCEDURE — 93000 ELECTROCARDIOGRAM COMPLETE: CPT

## 2021-09-10 PROCEDURE — 99214 OFFICE O/P EST MOD 30 MIN: CPT

## 2021-09-10 NOTE — PHYSICAL EXAM
[Well Developed] : well developed [Well Nourished] : well nourished [No Acute Distress] : no acute distress [Normal Conjunctiva] : normal conjunctiva [No Carotid Bruit] : no carotid bruit [Normal Venous Pressure] : normal venous pressure [Normal Rate] : normal [Irregularly Irregular] : irregularly irregular [Normal S1] : normal S1 [Normal S2] : normal S2 [S3] : no S3 [S4] : no S4 [No Murmur] : no murmurs heard [___ +] : bilateral [unfilled]U+ pretibial pitting edema [Right Carotid Bruit] : no bruit heard over the right carotid [Left Carotid Bruit] : no bruit heard over the left carotid [Left Femoral Bruit] : no bruit heard over the left femoral artery [Right Femoral Bruit] : no bruit heard over the right femoral artery [2+] : left 2+ [No Abnormalities] : the abdominal aorta was not enlarged and no bruit was heard [Good Air Entry] : good air entry [No Respiratory Distress] : no respiratory distress  [Soft] : abdomen soft [Non Tender] : non-tender [No Masses/organomegaly] : no masses/organomegaly [Normal Bowel Sounds] : normal bowel sounds [Normal Gait] : normal gait [No Cyanosis] : no cyanosis [No Clubbing] : no clubbing [No Varicosities] : no varicosities [No Rash] : no rash [No Skin Lesions] : no skin lesions [Moves all extremities] : moves all extremities [No Focal Deficits] : no focal deficits [Normal Speech] : normal speech [Alert and Oriented] : alert and oriented [Normal memory] : normal memory [de-identified] : decreased bs at right base to mid lung. [de-identified] : 1+ pitting edema

## 2021-09-10 NOTE — HISTORY OF PRESENT ILLNESS
[FreeTextEntry1] : Celestine is a 70-year-old male here for follow up.\par \par I last saw him in 4/21.\par His past medical history is notable for neuropathy/back surgery, a right foot ulcer, melanoma status post resection, and hypertension. 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 He was found to have OM, and finished abx through a picc line.\par \par He has a history of atrial fibrillation, status post cardioversion in the past in 2019. After a resection of a melanoma, in the recovery room he was noted to be in asymptomatic AF.  He was anticoagulated, rate controlled.  Echo unremarkable.  Noted to have brief wct and so had a nuclear stress, which revealed a small area of inferolateral ischemia, which was decided to be managed conservatively.  YANICK DCCV was performed, and he was discharged in SR.\par \par He was doing quite well at last visit, though has had an exciting few months. He tripped and fell, and broke many vertebrae in his back, and needed emergent neurosurgery. He was eventually discharged to rehab and had a slow recovery, complicated by a wound on his foot. He was finally discharged from rehab in August.\par \par Today he reports dyspnea on exertion. He has no chest pain or palpitations, though does report a decrease in exercise tolerance. He had a pharmacologic stress test in April, prior to this, that did not demonstrate any ischemia\par Echocardiogram in 2021 demonstrated a overall preserved EF, though with mild distal inferior wall hypokinesis.\par

## 2021-09-10 NOTE — DISCUSSION/SUMMARY
[___ Month(s)] : in [unfilled] month(s) [FreeTextEntry1] : Celestine recently recovered from a fall and neurosurgery. Though feeling well, he does report dyspnea on exertion, which has been more noticeable as of late.\par \par His blood pressure is at goal, and EKG continues to demonstrate rate controlled atrial fibrillation. He has decreased breath sounds on the right, and mild pitting edema on exam. \par \par His dyspnea could be multifactorial as he recovers from his surgery, though he does appear volume overloaded. I am going to start him on Lasix 20 mg daily, and repeat his echocardiogram to evaluate his LV function (he had mild inferior hypokinesis earlier this year). I do not think his dyspnea is atrial fibrillation is related, as his rates are controlled, and he has not been short of breath in the past despite being in AF. He is going to have a chest x-ray for further evaluation.\par \par He did have a stress test in 2018, that demonstrated inferolateral ischemia, which was not seen on a stress test in our office this year. That being said, if his symptoms continue, he may warrant a cardiac cath.\par \par He will remain on Toprol XL and anticoagulation. We will speak after the above testing, and arrange follow-up.

## 2021-09-14 ENCOUNTER — OUTPATIENT (OUTPATIENT)
Dept: OUTPATIENT SERVICES | Facility: HOSPITAL | Age: 71
LOS: 1 days | Discharge: HOME | End: 2021-09-14
Payer: COMMERCIAL

## 2021-09-14 ENCOUNTER — APPOINTMENT (OUTPATIENT)
Dept: WOUND CARE | Facility: HOSPITAL | Age: 71
End: 2021-09-14
Payer: MEDICARE

## 2021-09-14 VITALS
TEMPERATURE: 97.5 F | BODY MASS INDEX: 28.35 KG/M2 | OXYGEN SATURATION: 99 % | HEIGHT: 75 IN | RESPIRATION RATE: 20 BRPM | HEART RATE: 52 BPM | DIASTOLIC BLOOD PRESSURE: 75 MMHG | WEIGHT: 228 LBS | SYSTOLIC BLOOD PRESSURE: 146 MMHG

## 2021-09-14 DIAGNOSIS — Z85.828 PERSONAL HISTORY OF OTHER MALIGNANT NEOPLASM OF SKIN: Chronic | ICD-10-CM

## 2021-09-14 DIAGNOSIS — M86.9 OSTEOMYELITIS, UNSPECIFIED: ICD-10-CM

## 2021-09-14 PROCEDURE — 99213 OFFICE O/P EST LOW 20 MIN: CPT

## 2021-09-14 PROCEDURE — G0463: CPT

## 2021-09-14 RX ORDER — LINEZOLID 600 MG/1
600 TABLET, FILM COATED ORAL TWICE DAILY
Qty: 28 | Refills: 0 | Status: DISCONTINUED | COMMUNITY
Start: 2021-08-03 | End: 2021-09-14

## 2021-09-14 RX ORDER — SULFAMETHOXAZOLE AND TRIMETHOPRIM 800; 160 MG/1; MG/1
800-160 TABLET ORAL
Qty: 20 | Refills: 0 | Status: DISCONTINUED | COMMUNITY
Start: 2021-08-05 | End: 2021-09-14

## 2021-09-15 NOTE — ASSESSMENT
[Verbal] : Verbal [Demo] : Demo [Patient] : Patient [Good - alert, interested, motivated] : Good - alert, interested, motivated [Verbalizes knowledge/Understanding] : Verbalizes knowledge/understanding [Dressing changes] : dressing changes [Foot Care] : foot care [Skin Care] : skin care [Pressure relief] : pressure relief [Signs and symptoms of infection] : sign and symptoms of infection [Nutrition] : nutrition [How and When to Call] : how and when to call [Off-loading] : off-loading [Home Health] : home health [Patient responsibility to plan of care] : patient responsibility to plan of care [Stable] : stable [Home] : Home [Ambulatory] : Ambulatory [Faxed - Long Term Care/Home Health Agency] : Long Term Care/Home Health Agency: Faxed [] : No [FreeTextEntry2] : Promote optimal skin integrity, offloading, infection prevention [FreeTextEntry4] : f/u 1 week\par  [FreeTextEntry1] : [facility]

## 2021-09-15 NOTE — VITALS
[] : No [de-identified] : Pain scale:  0/10 - Patient reports no c/o pains or discomforts at present.

## 2021-09-15 NOTE — REVIEW OF SYSTEMS
[Arthralgias] : arthralgias [Joint Stiffness] : joint stiffness [Skin Wound] : skin wound [Easy Bleeding] : a tendency for easy bleeding [Easy Bruising] : a tendency for easy bruising [Negative] : Endocrine [Fever] : no fever [Chills] : no chills [Eye Pain] : no eye pain [Loss Of Hearing] : no hearing loss [Shortness Of Breath] : no shortness of breath [Wheezing] : no wheezing [Abdominal Pain] : no abdominal pain [Vomiting] : no vomiting [Anxiety] : no anxiety [FreeTextEntry5] : HTN , A-fib [FreeTextEntry9] : Hammer toes and forefoot deformity  [de-identified] : Stage 3 pressure ulcer plantar 5th met left  foot , skin, subcutaneous and fat , smaller and clean  [de-identified] : peripheral neuropathy  [de-identified] : pt on Xarelto for A-fib

## 2021-09-15 NOTE — PHYSICAL EXAM
[4 x 4] : 4 x 4  [1+] : left 1+ [No Rash or Lesion] : No rash or lesion [Skin Ulcer] : ulcer [Alert] : alert [Oriented to Person] : oriented to person [Oriented to Place] : oriented to place [Oriented to Time] : oriented to time [Calm] : calm [Purpura] : no purpura  [Petechiae] : no petechiae [de-identified] : A&Ox3, NAD [de-identified] : HTN , A-fib [de-identified] : forefoot deformity of the toes , + OM on x ray findings  [de-identified] : Stage 3 pressure ulcer plantar left 5th metatarsal , skin, subcutaneous and fat  [de-identified] : peripheral neuropathy  [FreeTextEntry1] : Left Lateral 5th Met Head [FreeTextEntry2] : 1.1 [FreeTextEntry3] : 1.0 [de-identified] : small serosanguineous [FreeTextEntry4] : 0.1 [de-identified] : intact [de-identified] : 100% [de-identified] : Silver Alginate [de-identified] : NSC\par cloth tape\par  [de-identified] : False [TWNoteComboBox4] : False [de-identified] : None [de-identified] : False [de-identified] : False [de-identified] : False [de-identified] : 3x Weekly [de-identified] : False

## 2021-09-16 DIAGNOSIS — Z85.828 PERSONAL HISTORY OF OTHER MALIGNANT NEOPLASM OF SKIN: ICD-10-CM

## 2021-09-16 DIAGNOSIS — Z85.820 PERSONAL HISTORY OF MALIGNANT MELANOMA OF SKIN: ICD-10-CM

## 2021-09-16 DIAGNOSIS — K57.32 DIVERTICULITIS OF LARGE INTESTINE WITHOUT PERFORATION OR ABSCESS WITHOUT BLEEDING: ICD-10-CM

## 2021-09-16 DIAGNOSIS — L89.893 PRESSURE ULCER OF OTHER SITE, STAGE 3: ICD-10-CM

## 2021-09-16 DIAGNOSIS — M86.672 OTHER CHRONIC OSTEOMYELITIS, LEFT ANKLE AND FOOT: ICD-10-CM

## 2021-09-16 DIAGNOSIS — Z84.0 FAMILY HISTORY OF DISEASES OF THE SKIN AND SUBCUTANEOUS TISSUE: ICD-10-CM

## 2021-09-16 DIAGNOSIS — I48.91 UNSPECIFIED ATRIAL FIBRILLATION: ICD-10-CM

## 2021-09-16 DIAGNOSIS — I11.9 HYPERTENSIVE HEART DISEASE WITHOUT HEART FAILURE: ICD-10-CM

## 2021-09-16 DIAGNOSIS — L84 CORNS AND CALLOSITIES: ICD-10-CM

## 2021-09-16 DIAGNOSIS — G62.9 POLYNEUROPATHY, UNSPECIFIED: ICD-10-CM

## 2021-09-16 DIAGNOSIS — Z87.828 PERSONAL HISTORY OF OTHER (HEALED) PHYSICAL INJURY AND TRAUMA: ICD-10-CM

## 2021-09-16 DIAGNOSIS — Z82.49 FAMILY HISTORY OF ISCHEMIC HEART DISEASE AND OTHER DISEASES OF THE CIRCULATORY SYSTEM: ICD-10-CM

## 2021-09-16 DIAGNOSIS — Z79.899 OTHER LONG TERM (CURRENT) DRUG THERAPY: ICD-10-CM

## 2021-09-21 ENCOUNTER — OUTPATIENT (OUTPATIENT)
Dept: OUTPATIENT SERVICES | Facility: HOSPITAL | Age: 71
LOS: 1 days | Discharge: HOME | End: 2021-09-21
Payer: COMMERCIAL

## 2021-09-21 ENCOUNTER — APPOINTMENT (OUTPATIENT)
Dept: WOUND CARE | Facility: HOSPITAL | Age: 71
End: 2021-09-21
Payer: MEDICARE

## 2021-09-21 VITALS
DIASTOLIC BLOOD PRESSURE: 87 MMHG | WEIGHT: 228 LBS | BODY MASS INDEX: 28.35 KG/M2 | TEMPERATURE: 97.3 F | HEIGHT: 75 IN | SYSTOLIC BLOOD PRESSURE: 132 MMHG | HEART RATE: 86 BPM | OXYGEN SATURATION: 97 % | RESPIRATION RATE: 20 BRPM

## 2021-09-21 DIAGNOSIS — Z85.828 PERSONAL HISTORY OF OTHER MALIGNANT NEOPLASM OF SKIN: Chronic | ICD-10-CM

## 2021-09-21 DIAGNOSIS — M86.9 OSTEOMYELITIS, UNSPECIFIED: ICD-10-CM

## 2021-09-21 PROCEDURE — 99213 OFFICE O/P EST LOW 20 MIN: CPT

## 2021-09-21 PROCEDURE — G0463: CPT

## 2021-09-21 NOTE — ASSESSMENT
[Verbal] : Verbal [Demo] : Demo [Patient] : Patient [Good - alert, interested, motivated] : Good - alert, interested, motivated [Verbalizes knowledge/Understanding] : Verbalizes knowledge/understanding [Dressing changes] : dressing changes [Foot Care] : foot care [Skin Care] : skin care [Pressure relief] : pressure relief [Signs and symptoms of infection] : sign and symptoms of infection [Nutrition] : nutrition [How and When to Call] : how and when to call [Pain Management] : pain management [Off-loading] : off-loading [Patient responsibility to plan of care] : patient responsibility to plan of care [Glycemic Control] : glycemic control [] : Yes [Stable] : stable [Home] : Home [Ambulatory] : Ambulatory [Not Applicable - Long Term Care/Home Health Agency] : Long Term Care/Home Health Agency: Not Applicable [FreeTextEntry2] : Infection Prevention\par Localized Wound Care\par Offloading / Pressure Relief\par Promote Optimal Skin Integrity\par Maintain acceptable pain level with use of pharmacological and nonpharmacological interventions  [FreeTextEntry4] : F/U to [facility] for an assessment in one week

## 2021-09-21 NOTE — PHYSICAL EXAM
[4 x 4] : 4 x 4  [Normal Breath Sounds] : Normal breath sounds [1+] : left 1+ [No Rash or Lesion] : No rash or lesion [Skin Ulcer] : ulcer [Alert] : alert [Oriented to Person] : oriented to person [Oriented to Place] : oriented to place [Oriented to Time] : oriented to time [Calm] : calm [Purpura] : no purpura  [Petechiae] : no petechiae [de-identified] : comfortable  [de-identified] : HTN , A-fib [de-identified] : forefoot deformity of the toes , + OM on x ray findings  [de-identified] : Stage 3 pressure ulcer plantar left 5th metatarsal , skin, subcutaneous and fat  [de-identified] : peripheral neuropathy  [FreeTextEntry1] : Left Lateral 5th Met Head [FreeTextEntry2] : 1.1 [FreeTextEntry3] : 0.8 [FreeTextEntry4] : 0.1 [de-identified] : Serosanguineous [de-identified] : Callus with mild maceration  [de-identified] : 100% [de-identified] : Silver Alginate [de-identified] : Cleansed with Normal Saline \par cloth tape\par  [TWNoteComboBox4] : Small [de-identified] : None [de-identified] : 3x Weekly

## 2021-09-21 NOTE — PLAN
[FreeTextEntry1] : continue off loading and wound care   Spent 20 minutes for patient care and medical decision making. Patient aware that waiting on surgical intervention can cause more bone and soft tissue destruction , making it necessary to amputate more . Patient understands consequences and is high risk for limb lose .\par

## 2021-09-21 NOTE — REVIEW OF SYSTEMS
[Arthralgias] : arthralgias [Joint Stiffness] : joint stiffness [Skin Wound] : skin wound [Easy Bleeding] : a tendency for easy bleeding [Easy Bruising] : a tendency for easy bruising [Negative] : Endocrine [Fever] : no fever [Chills] : no chills [Eye Pain] : no eye pain [Loss Of Hearing] : no hearing loss [Shortness Of Breath] : no shortness of breath [Wheezing] : no wheezing [Abdominal Pain] : no abdominal pain [Vomiting] : no vomiting [Anxiety] : no anxiety [FreeTextEntry5] : HTN , A-fib [FreeTextEntry9] : Hammer toes and forefoot deformity  [de-identified] : Stage 3 pressure ulcer plantar 5th met left  foot , skin, subcutaneous and fat , smaller and clean  [de-identified] : peripheral neuropathy  [de-identified] : pt on Xarelto for A-fib

## 2021-09-21 NOTE — VITALS
[] : No [de-identified] : Pt rates pain 0/10.  Patient denies any pain or discomfort at the present

## 2021-09-22 ENCOUNTER — APPOINTMENT (OUTPATIENT)
Dept: CARDIOLOGY | Facility: CLINIC | Age: 71
End: 2021-09-22
Payer: MEDICARE

## 2021-09-22 DIAGNOSIS — I11.9 HYPERTENSIVE HEART DISEASE WITHOUT HEART FAILURE: ICD-10-CM

## 2021-09-22 DIAGNOSIS — M86.672 OTHER CHRONIC OSTEOMYELITIS, LEFT ANKLE AND FOOT: ICD-10-CM

## 2021-09-22 DIAGNOSIS — G62.9 POLYNEUROPATHY, UNSPECIFIED: ICD-10-CM

## 2021-09-22 DIAGNOSIS — Z87.828 PERSONAL HISTORY OF OTHER (HEALED) PHYSICAL INJURY AND TRAUMA: ICD-10-CM

## 2021-09-22 DIAGNOSIS — Z85.828 PERSONAL HISTORY OF OTHER MALIGNANT NEOPLASM OF SKIN: ICD-10-CM

## 2021-09-22 DIAGNOSIS — Z84.0 FAMILY HISTORY OF DISEASES OF THE SKIN AND SUBCUTANEOUS TISSUE: ICD-10-CM

## 2021-09-22 DIAGNOSIS — Z79.899 OTHER LONG TERM (CURRENT) DRUG THERAPY: ICD-10-CM

## 2021-09-22 DIAGNOSIS — L84 CORNS AND CALLOSITIES: ICD-10-CM

## 2021-09-22 DIAGNOSIS — Z85.820 PERSONAL HISTORY OF MALIGNANT MELANOMA OF SKIN: ICD-10-CM

## 2021-09-22 DIAGNOSIS — L89.893 PRESSURE ULCER OF OTHER SITE, STAGE 3: ICD-10-CM

## 2021-09-22 DIAGNOSIS — Z82.49 FAMILY HISTORY OF ISCHEMIC HEART DISEASE AND OTHER DISEASES OF THE CIRCULATORY SYSTEM: ICD-10-CM

## 2021-09-22 DIAGNOSIS — I48.91 UNSPECIFIED ATRIAL FIBRILLATION: ICD-10-CM

## 2021-09-22 DIAGNOSIS — K57.32 DIVERTICULITIS OF LARGE INTESTINE WITHOUT PERFORATION OR ABSCESS WITHOUT BLEEDING: ICD-10-CM

## 2021-09-22 PROCEDURE — 93306 TTE W/DOPPLER COMPLETE: CPT

## 2021-09-28 ENCOUNTER — OUTPATIENT (OUTPATIENT)
Dept: OUTPATIENT SERVICES | Facility: HOSPITAL | Age: 71
LOS: 1 days | Discharge: HOME | End: 2021-09-28
Payer: COMMERCIAL

## 2021-09-28 ENCOUNTER — APPOINTMENT (OUTPATIENT)
Dept: WOUND CARE | Facility: HOSPITAL | Age: 71
End: 2021-09-28
Payer: MEDICARE

## 2021-09-28 VITALS
HEIGHT: 75 IN | WEIGHT: 228 LBS | HEART RATE: 90 BPM | BODY MASS INDEX: 28.35 KG/M2 | DIASTOLIC BLOOD PRESSURE: 92 MMHG | OXYGEN SATURATION: 99 % | SYSTOLIC BLOOD PRESSURE: 135 MMHG | RESPIRATION RATE: 18 BRPM | TEMPERATURE: 97.1 F

## 2021-09-28 DIAGNOSIS — Z85.828 PERSONAL HISTORY OF OTHER MALIGNANT NEOPLASM OF SKIN: Chronic | ICD-10-CM

## 2021-09-28 DIAGNOSIS — M86.672 OTHER CHRONIC OSTEOMYELITIS, LEFT ANKLE AND FOOT: ICD-10-CM

## 2021-09-28 PROCEDURE — G0463: CPT

## 2021-09-28 PROCEDURE — 99213 OFFICE O/P EST LOW 20 MIN: CPT

## 2021-09-29 DIAGNOSIS — M86.672 OTHER CHRONIC OSTEOMYELITIS, LEFT ANKLE AND FOOT: ICD-10-CM

## 2021-09-29 DIAGNOSIS — Z84.0 FAMILY HISTORY OF DISEASES OF THE SKIN AND SUBCUTANEOUS TISSUE: ICD-10-CM

## 2021-09-29 DIAGNOSIS — Z87.828 PERSONAL HISTORY OF OTHER (HEALED) PHYSICAL INJURY AND TRAUMA: ICD-10-CM

## 2021-09-29 DIAGNOSIS — I11.9 HYPERTENSIVE HEART DISEASE WITHOUT HEART FAILURE: ICD-10-CM

## 2021-09-29 DIAGNOSIS — L89.893 PRESSURE ULCER OF OTHER SITE, STAGE 3: ICD-10-CM

## 2021-09-29 DIAGNOSIS — Z85.828 PERSONAL HISTORY OF OTHER MALIGNANT NEOPLASM OF SKIN: ICD-10-CM

## 2021-09-29 DIAGNOSIS — Z85.820 PERSONAL HISTORY OF MALIGNANT MELANOMA OF SKIN: ICD-10-CM

## 2021-09-29 DIAGNOSIS — I48.91 UNSPECIFIED ATRIAL FIBRILLATION: ICD-10-CM

## 2021-09-29 DIAGNOSIS — G62.9 POLYNEUROPATHY, UNSPECIFIED: ICD-10-CM

## 2021-09-29 DIAGNOSIS — Z79.899 OTHER LONG TERM (CURRENT) DRUG THERAPY: ICD-10-CM

## 2021-09-29 DIAGNOSIS — Z82.49 FAMILY HISTORY OF ISCHEMIC HEART DISEASE AND OTHER DISEASES OF THE CIRCULATORY SYSTEM: ICD-10-CM

## 2021-09-29 NOTE — PHYSICAL EXAM
[4 x 4] : 4 x 4  [Normal Breath Sounds] : Normal breath sounds [1+] : left 1+ [No Rash or Lesion] : No rash or lesion [Purpura] : no purpura  [Petechiae] : no petechiae [Skin Ulcer] : ulcer [Alert] : alert [Oriented to Person] : oriented to person [Oriented to Place] : oriented to place [Oriented to Time] : oriented to time [Calm] : calm [de-identified] : comfortable  [de-identified] : HTN , A-fib [de-identified] : forefoot deformity of the toes , + OM on x ray findings  [de-identified] : Stage 3 pressure ulcer plantar left 5th metatarsal , skin, subcutaneous and fat  [de-identified] : peripheral neuropathy  [FreeTextEntry1] : Left Lateral 5th Met Head [FreeTextEntry2] : 0.7 [FreeTextEntry3] : 0.7 [FreeTextEntry4] : 0.1 [de-identified] : Serosanguineous [de-identified] : Callus- Removed [de-identified] : 100% [de-identified] : Silver Alginate [de-identified] : Cleansed with Normal Saline \par cloth tape\par  [TWNoteComboBox4] : Small [de-identified] : None [de-identified] : Every other day

## 2021-09-29 NOTE — REVIEW OF SYSTEMS
[Fever] : no fever [Chills] : no chills [Eye Pain] : no eye pain [Loss Of Hearing] : no hearing loss [Shortness Of Breath] : no shortness of breath [Wheezing] : no wheezing [Abdominal Pain] : no abdominal pain [Vomiting] : no vomiting [Arthralgias] : arthralgias [Joint Stiffness] : joint stiffness [Skin Wound] : skin wound [Anxiety] : no anxiety [Easy Bleeding] : a tendency for easy bleeding [Easy Bruising] : a tendency for easy bruising [Negative] : Endocrine [FreeTextEntry5] : HTN , A-fib [de-identified] : Stage 3 pressure ulcer plantar 5th met left  foot , skin, subcutaneous and fat , smaller and clean  [FreeTextEntry9] : Hammer toes and forefoot deformity  [de-identified] : peripheral neuropathy  [de-identified] : pt on Xarelto for A-fib

## 2021-09-29 NOTE — ASSESSMENT
[Verbal] : Verbal [Demo] : Demo [Patient] : Patient [Good - alert, interested, motivated] : Good - alert, interested, motivated [Verbalizes knowledge/Understanding] : Verbalizes knowledge/understanding [Dressing changes] : dressing changes [Foot Care] : foot care [Skin Care] : skin care [Pressure relief] : pressure relief [Signs and symptoms of infection] : sign and symptoms of infection [Nutrition] : nutrition [How and When to Call] : how and when to call [Pain Management] : pain management [Off-loading] : off-loading [Patient responsibility to plan of care] : patient responsibility to plan of care [Glycemic Control] : glycemic control [Stable] : stable [Home] : Home [Ambulatory] : Ambulatory [Not Applicable - Long Term Care/Home Health Agency] : Long Term Care/Home Health Agency: Not Applicable [] : No [FreeTextEntry2] : Infection Prevention\par Localized Wound Care\par Offloading / Pressure Relief\par Promote Optimal Skin Integrity\par Maintain acceptable pain level with use of pharmacological and nonpharmacological interventions  [FreeTextEntry4] : F/U to [facility] in 1 Week\par

## 2021-10-05 ENCOUNTER — APPOINTMENT (OUTPATIENT)
Dept: WOUND CARE | Facility: HOSPITAL | Age: 71
End: 2021-10-05
Payer: MEDICARE

## 2021-10-05 ENCOUNTER — OUTPATIENT (OUTPATIENT)
Dept: OUTPATIENT SERVICES | Facility: HOSPITAL | Age: 71
LOS: 1 days | Discharge: HOME | End: 2021-10-05
Payer: COMMERCIAL

## 2021-10-05 VITALS
HEART RATE: 89 BPM | WEIGHT: 228 LBS | RESPIRATION RATE: 20 BRPM | SYSTOLIC BLOOD PRESSURE: 134 MMHG | TEMPERATURE: 97.2 F | BODY MASS INDEX: 28.35 KG/M2 | DIASTOLIC BLOOD PRESSURE: 87 MMHG | OXYGEN SATURATION: 99 % | HEIGHT: 75 IN

## 2021-10-05 DIAGNOSIS — Z79.899 OTHER LONG TERM (CURRENT) DRUG THERAPY: ICD-10-CM

## 2021-10-05 DIAGNOSIS — K57.32 DIVERTICULITIS OF LARGE INTESTINE WITHOUT PERFORATION OR ABSCESS WITHOUT BLEEDING: ICD-10-CM

## 2021-10-05 DIAGNOSIS — I11.9 HYPERTENSIVE HEART DISEASE WITHOUT HEART FAILURE: ICD-10-CM

## 2021-10-05 DIAGNOSIS — M86.672 OTHER CHRONIC OSTEOMYELITIS, LEFT ANKLE AND FOOT: ICD-10-CM

## 2021-10-05 DIAGNOSIS — E11.621 TYPE 2 DIABETES MELLITUS WITH FOOT ULCER: ICD-10-CM

## 2021-10-05 DIAGNOSIS — Z82.49 FAMILY HISTORY OF ISCHEMIC HEART DISEASE AND OTHER DISEASES OF THE CIRCULATORY SYSTEM: ICD-10-CM

## 2021-10-05 DIAGNOSIS — Z87.828 PERSONAL HISTORY OF OTHER (HEALED) PHYSICAL INJURY AND TRAUMA: ICD-10-CM

## 2021-10-05 DIAGNOSIS — Z84.0 FAMILY HISTORY OF DISEASES OF THE SKIN AND SUBCUTANEOUS TISSUE: ICD-10-CM

## 2021-10-05 DIAGNOSIS — Z85.820 PERSONAL HISTORY OF MALIGNANT MELANOMA OF SKIN: ICD-10-CM

## 2021-10-05 DIAGNOSIS — I48.91 UNSPECIFIED ATRIAL FIBRILLATION: ICD-10-CM

## 2021-10-05 DIAGNOSIS — G62.9 POLYNEUROPATHY, UNSPECIFIED: ICD-10-CM

## 2021-10-05 DIAGNOSIS — Z85.828 PERSONAL HISTORY OF OTHER MALIGNANT NEOPLASM OF SKIN: Chronic | ICD-10-CM

## 2021-10-05 PROCEDURE — G0463: CPT

## 2021-10-05 PROCEDURE — 99213 OFFICE O/P EST LOW 20 MIN: CPT

## 2021-10-05 NOTE — ASSESSMENT
[Verbal] : Verbal [Demo] : Demo [Patient] : Patient [Good - alert, interested, motivated] : Good - alert, interested, motivated [Verbalizes knowledge/Understanding] : Verbalizes knowledge/understanding [Dressing changes] : dressing changes [Foot Care] : foot care [Skin Care] : skin care [Pressure relief] : pressure relief [Signs and symptoms of infection] : sign and symptoms of infection [Nutrition] : nutrition [How and When to Call] : how and when to call [Pain Management] : pain management [Off-loading] : off-loading [Patient responsibility to plan of care] : patient responsibility to plan of care [Glycemic Control] : glycemic control [] : Yes [Stable] : stable [Home] : Home [Ambulatory] : Ambulatory [Not Applicable - Long Term Care/Home Health Agency] : Long Term Care/Home Health Agency: Not Applicable [FreeTextEntry2] : Infection Prevention\par Localized Wound Care\par Offloading / Pressure Relief\par Restore Optimal Skin Integrity  [FreeTextEntry4] : F/U to [facility] in 1 Week\par

## 2021-10-05 NOTE — PHYSICAL EXAM
[4 x 4] : 4 x 4  [Normal Breath Sounds] : Normal breath sounds [1+] : left 1+ [No Rash or Lesion] : No rash or lesion [Purpura] : no purpura  [Petechiae] : no petechiae [Skin Ulcer] : ulcer [Alert] : alert [Oriented to Person] : oriented to person [Oriented to Place] : oriented to place [Oriented to Time] : oriented to time [Calm] : calm [de-identified] : comfortable  [de-identified] : HTN , A-fib [de-identified] : forefoot deformity of the toes , + OM on x ray findings  [de-identified] : Stage 3 pressure ulcer plantar left 5th metatarsal , skin, subcutaneous and fat  [de-identified] : peripheral neuropathy  [FreeTextEntry2] : 0.7 [FreeTextEntry1] : Left Lateral 5th Met Head [FreeTextEntry3] : 0.7 [FreeTextEntry4] : 0.1 [de-identified] : Serosanguineous [de-identified] : Callus- Removed [de-identified] : Silver Alginate [de-identified] : 100% [de-identified] : Cleansed with Normal Saline \par cloth tape\par  [TWNoteComboBox4] : Small [de-identified] : None [de-identified] : No [de-identified] : Every other day

## 2021-10-05 NOTE — REVIEW OF SYSTEMS
[Fever] : no fever [Chills] : no chills [Eye Pain] : no eye pain [Loss Of Hearing] : no hearing loss [Shortness Of Breath] : no shortness of breath [Wheezing] : no wheezing [Abdominal Pain] : no abdominal pain [Vomiting] : no vomiting [Arthralgias] : arthralgias [Joint Stiffness] : joint stiffness [Skin Wound] : skin wound [Anxiety] : no anxiety [Easy Bleeding] : a tendency for easy bleeding [Easy Bruising] : a tendency for easy bruising [Negative] : Endocrine [FreeTextEntry5] : HTN , A-fib [FreeTextEntry9] : Hammer toes and forefoot deformity  [de-identified] : Stage 3 pressure ulcer plantar 5th met left  foot , skin, subcutaneous and fat , smaller and clean  [de-identified] : peripheral neuropathy  [de-identified] : pt on Xarelto for A-fib

## 2021-10-05 NOTE — PLAN
[FreeTextEntry1] : continue wound care and off loading . Patient aware of long term plan , if ulcer doesn’t close in the next 6-8 patient will elect for met head resection to close the wound . Patient understands all the risks and benefits Spent 20 minutes for patient care and medical decision making.\par

## 2021-10-12 ENCOUNTER — OUTPATIENT (OUTPATIENT)
Dept: OUTPATIENT SERVICES | Facility: HOSPITAL | Age: 71
LOS: 1 days | Discharge: HOME | End: 2021-10-12
Payer: COMMERCIAL

## 2021-10-12 ENCOUNTER — APPOINTMENT (OUTPATIENT)
Dept: WOUND CARE | Facility: HOSPITAL | Age: 71
End: 2021-10-12
Payer: MEDICARE

## 2021-10-12 VITALS
DIASTOLIC BLOOD PRESSURE: 96 MMHG | TEMPERATURE: 97 F | WEIGHT: 228 LBS | HEART RATE: 79 BPM | OXYGEN SATURATION: 100 % | RESPIRATION RATE: 20 BRPM | BODY MASS INDEX: 28.35 KG/M2 | HEIGHT: 75 IN | SYSTOLIC BLOOD PRESSURE: 146 MMHG

## 2021-10-12 DIAGNOSIS — E11.621 TYPE 2 DIABETES MELLITUS WITH FOOT ULCER: ICD-10-CM

## 2021-10-12 DIAGNOSIS — Z85.828 PERSONAL HISTORY OF OTHER MALIGNANT NEOPLASM OF SKIN: Chronic | ICD-10-CM

## 2021-10-12 PROCEDURE — G0463: CPT

## 2021-10-12 PROCEDURE — 99213 OFFICE O/P EST LOW 20 MIN: CPT

## 2021-10-13 DIAGNOSIS — Z87.828 PERSONAL HISTORY OF OTHER (HEALED) PHYSICAL INJURY AND TRAUMA: ICD-10-CM

## 2021-10-13 DIAGNOSIS — Z79.899 OTHER LONG TERM (CURRENT) DRUG THERAPY: ICD-10-CM

## 2021-10-13 DIAGNOSIS — I11.9 HYPERTENSIVE HEART DISEASE WITHOUT HEART FAILURE: ICD-10-CM

## 2021-10-13 DIAGNOSIS — I48.91 UNSPECIFIED ATRIAL FIBRILLATION: ICD-10-CM

## 2021-10-13 DIAGNOSIS — M86.672 OTHER CHRONIC OSTEOMYELITIS, LEFT ANKLE AND FOOT: ICD-10-CM

## 2021-10-13 DIAGNOSIS — L89.893 PRESSURE ULCER OF OTHER SITE, STAGE 3: ICD-10-CM

## 2021-10-13 DIAGNOSIS — G62.9 POLYNEUROPATHY, UNSPECIFIED: ICD-10-CM

## 2021-10-13 DIAGNOSIS — Z85.820 PERSONAL HISTORY OF MALIGNANT MELANOMA OF SKIN: ICD-10-CM

## 2021-10-13 DIAGNOSIS — L84 CORNS AND CALLOSITIES: ICD-10-CM

## 2021-10-13 DIAGNOSIS — Z85.828 PERSONAL HISTORY OF OTHER MALIGNANT NEOPLASM OF SKIN: ICD-10-CM

## 2021-10-13 DIAGNOSIS — Z84.0 FAMILY HISTORY OF DISEASES OF THE SKIN AND SUBCUTANEOUS TISSUE: ICD-10-CM

## 2021-10-13 DIAGNOSIS — Z82.49 FAMILY HISTORY OF ISCHEMIC HEART DISEASE AND OTHER DISEASES OF THE CIRCULATORY SYSTEM: ICD-10-CM

## 2021-10-13 NOTE — PHYSICAL EXAM
[4 x 4] : 4 x 4  [Normal Breath Sounds] : Normal breath sounds [1+] : left 1+ [No Rash or Lesion] : No rash or lesion [Purpura] : no purpura  [Petechiae] : no petechiae [Skin Ulcer] : ulcer [Alert] : alert [Oriented to Person] : oriented to person [Oriented to Place] : oriented to place [Oriented to Time] : oriented to time [Calm] : calm [de-identified] : comfortable  [de-identified] : HTN , A-fib [de-identified] : Stage 3 pressure ulcer plantar left 5th metatarsal , skin, subcutaneous and fat  [de-identified] : forefoot deformity of the toes , + OM on x ray findings  [de-identified] : peripheral neuropathy  [FreeTextEntry1] : Left Lateral 5th Met Head [FreeTextEntry2] : 0.5 [FreeTextEntry3] : 0.6 [FreeTextEntry4] : 0.1 [de-identified] : Dried Serosanguineous [de-identified] : Callus - Shaved by RILEY [de-identified] : 100% [de-identified] : Alginate Ag [de-identified] : NSC\par DD [TWNoteComboBox4] : Small [de-identified] : None [de-identified] : No [de-identified] : Every other day [de-identified] : Primary Dressing

## 2021-10-13 NOTE — ASSESSMENT
[Verbal] : Verbal [Demo] : Demo [Patient] : Patient [Good - alert, interested, motivated] : Good - alert, interested, motivated [Verbalizes knowledge/Understanding] : Verbalizes knowledge/understanding [Dressing changes] : dressing changes [Foot Care] : foot care [Skin Care] : skin care [Signs and symptoms of infection] : sign and symptoms of infection [Nutrition] : nutrition [How and When to Call] : how and when to call [Pain Management] : pain management [Off-loading] : off-loading [Patient responsibility to plan of care] : patient responsibility to plan of care [Stable] : stable [Home] : Home [Ambulatory] : Ambulatory [Written] : Written [Faxed - Long Term Care/Home Health Agency] : Long Term Care/Home Health Agency: Faxed [] : No [FreeTextEntry2] : Infection prevention \par Wound care (dressing changes)\par Maintain optimal skin integrity to high pressure areas [FreeTextEntry4] : F/U to [facility] in 1 Week\par  [FreeTextEntry1] : [facility]

## 2021-10-13 NOTE — REVIEW OF SYSTEMS
[Fever] : no fever [Chills] : no chills [Eye Pain] : no eye pain [Loss Of Hearing] : no hearing loss [Shortness Of Breath] : no shortness of breath [Wheezing] : no wheezing [Abdominal Pain] : no abdominal pain [Vomiting] : no vomiting [Arthralgias] : arthralgias [Joint Stiffness] : joint stiffness [Skin Wound] : skin wound [Anxiety] : no anxiety [Easy Bleeding] : a tendency for easy bleeding [Easy Bruising] : a tendency for easy bruising [Negative] : Endocrine [FreeTextEntry5] : HTN , A-fib [FreeTextEntry9] : Hammer toes and forefoot deformity  [de-identified] : Stage 3 pressure ulcer plantar 5th met left  foot , skin, subcutaneous and fat , smaller and clean  [de-identified] : peripheral neuropathy  [de-identified] : pt on Xarelto for A-fib

## 2021-10-17 ENCOUNTER — RX RENEWAL (OUTPATIENT)
Age: 71
End: 2021-10-17

## 2021-10-19 ENCOUNTER — OUTPATIENT (OUTPATIENT)
Dept: OUTPATIENT SERVICES | Facility: HOSPITAL | Age: 71
LOS: 1 days | Discharge: HOME | End: 2021-10-19
Payer: COMMERCIAL

## 2021-10-19 ENCOUNTER — APPOINTMENT (OUTPATIENT)
Dept: WOUND CARE | Facility: HOSPITAL | Age: 71
End: 2021-10-19
Payer: MEDICARE

## 2021-10-19 VITALS
HEART RATE: 66 BPM | BODY MASS INDEX: 28.35 KG/M2 | RESPIRATION RATE: 20 BRPM | SYSTOLIC BLOOD PRESSURE: 138 MMHG | OXYGEN SATURATION: 100 % | DIASTOLIC BLOOD PRESSURE: 85 MMHG | WEIGHT: 228 LBS | TEMPERATURE: 97.1 F | HEIGHT: 75 IN

## 2021-10-19 DIAGNOSIS — Z85.820 PERSONAL HISTORY OF MALIGNANT MELANOMA OF SKIN: ICD-10-CM

## 2021-10-19 DIAGNOSIS — E11.621 TYPE 2 DIABETES MELLITUS WITH FOOT ULCER: ICD-10-CM

## 2021-10-19 DIAGNOSIS — L89.893 PRESSURE ULCER OF OTHER SITE, STAGE 3: ICD-10-CM

## 2021-10-19 DIAGNOSIS — L84 CORNS AND CALLOSITIES: ICD-10-CM

## 2021-10-19 DIAGNOSIS — K57.32 DIVERTICULITIS OF LARGE INTESTINE WITHOUT PERFORATION OR ABSCESS WITHOUT BLEEDING: ICD-10-CM

## 2021-10-19 DIAGNOSIS — M86.672 OTHER CHRONIC OSTEOMYELITIS, LEFT ANKLE AND FOOT: ICD-10-CM

## 2021-10-19 DIAGNOSIS — I11.9 HYPERTENSIVE HEART DISEASE WITHOUT HEART FAILURE: ICD-10-CM

## 2021-10-19 DIAGNOSIS — Z79.899 OTHER LONG TERM (CURRENT) DRUG THERAPY: ICD-10-CM

## 2021-10-19 DIAGNOSIS — Z87.828 PERSONAL HISTORY OF OTHER (HEALED) PHYSICAL INJURY AND TRAUMA: ICD-10-CM

## 2021-10-19 DIAGNOSIS — Z85.828 PERSONAL HISTORY OF OTHER MALIGNANT NEOPLASM OF SKIN: Chronic | ICD-10-CM

## 2021-10-19 DIAGNOSIS — I48.91 UNSPECIFIED ATRIAL FIBRILLATION: ICD-10-CM

## 2021-10-19 DIAGNOSIS — Z82.49 FAMILY HISTORY OF ISCHEMIC HEART DISEASE AND OTHER DISEASES OF THE CIRCULATORY SYSTEM: ICD-10-CM

## 2021-10-19 DIAGNOSIS — Z84.0 FAMILY HISTORY OF DISEASES OF THE SKIN AND SUBCUTANEOUS TISSUE: ICD-10-CM

## 2021-10-19 DIAGNOSIS — G62.9 POLYNEUROPATHY, UNSPECIFIED: ICD-10-CM

## 2021-10-19 DIAGNOSIS — Z85.828 PERSONAL HISTORY OF OTHER MALIGNANT NEOPLASM OF SKIN: ICD-10-CM

## 2021-10-19 PROCEDURE — G0463: CPT

## 2021-10-19 PROCEDURE — 99213 OFFICE O/P EST LOW 20 MIN: CPT

## 2021-10-19 NOTE — PHYSICAL EXAM
[Normal Breath Sounds] : Normal breath sounds [1+] : left 1+ [No Rash or Lesion] : No rash or lesion [Skin Ulcer] : ulcer [Alert] : alert [Oriented to Person] : oriented to person [Oriented to Place] : oriented to place [Oriented to Time] : oriented to time [Calm] : calm [2 x 2] : 2 x 2  [Purpura] : no purpura  [Petechiae] : no petechiae [de-identified] : comfortable  [de-identified] : HTN , A-fib [de-identified] : forefoot deformity of the toes , + OM on x ray findings  [de-identified] : Stage 3 pressure ulcer plantar left 5th metatarsal , skin, subcutaneous and fat  [de-identified] : peripheral neuropathy  [FreeTextEntry1] : Left lateral 5th Met head  [FreeTextEntry2] : 0.5 [FreeTextEntry3] : 0.6 [FreeTextEntry4] : 0.1 [de-identified] : sanguinous [de-identified] : Calcium alginate  [de-identified] : Cleansed with NS\par Cloth tape  [TWNoteComboBox4] : Small [de-identified] : other [de-identified] : None [de-identified] : None [de-identified] : 100% [de-identified] : No [de-identified] : 3x Weekly [de-identified] : Primary Dressing

## 2021-10-19 NOTE — ASSESSMENT
[Verbal] : Verbal [Written] : Written [Demo] : Demo [Patient] : Patient [Verbalizes knowledge/Understanding] : Verbalizes knowledge/understanding [Dressing changes] : dressing changes [Foot Care] : foot care [Skin Care] : skin care [Signs and symptoms of infection] : sign and symptoms of infection [How and When to Call] : how and when to call [Patient responsibility to plan of care] : patient responsibility to plan of care [Stable] : stable [Home] : Home [Ambulatory] : Ambulatory [Faxed - Long Term Care/Home Health Agency] : Long Term Care/Home Health Agency: Faxed [] : No [FreeTextEntry2] : Infection prevention\par Localized wound care \par Goal of remaining pain free regarding wounds.\par  [FreeTextEntry3] : Wound the same in size  [FreeTextEntry4] : Follow up in 1 week  [FreeTextEntry1] : [facility]

## 2021-10-19 NOTE — PLAN
[FreeTextEntry1] : continue off loading and wound care. patient may need surgical intervention if wound does not heal. Spent 20 minutes for patient care and medical decision making.\par

## 2021-10-19 NOTE — REVIEW OF SYSTEMS
[Arthralgias] : arthralgias [Joint Stiffness] : joint stiffness [Skin Wound] : skin wound [Easy Bleeding] : a tendency for easy bleeding [Easy Bruising] : a tendency for easy bruising [Negative] : Endocrine [Fever] : no fever [Chills] : no chills [Eye Pain] : no eye pain [Loss Of Hearing] : no hearing loss [Shortness Of Breath] : no shortness of breath [Wheezing] : no wheezing [Abdominal Pain] : no abdominal pain [Vomiting] : no vomiting [Anxiety] : no anxiety [FreeTextEntry5] : HTN , A-fib [de-identified] : Stage 3 pressure ulcer plantar 5th met left  foot , skin, subcutaneous and fat , clean  [FreeTextEntry9] : Hammer toes and forefoot deformity  [de-identified] : peripheral neuropathy  [de-identified] : pt on Xarelto for A-fib

## 2021-10-28 ENCOUNTER — APPOINTMENT (OUTPATIENT)
Dept: WOUND CARE | Facility: HOSPITAL | Age: 71
End: 2021-10-28

## 2021-11-16 ENCOUNTER — OUTPATIENT (OUTPATIENT)
Dept: OUTPATIENT SERVICES | Facility: HOSPITAL | Age: 71
LOS: 1 days | Discharge: HOME | End: 2021-11-16
Payer: COMMERCIAL

## 2021-11-16 ENCOUNTER — APPOINTMENT (OUTPATIENT)
Dept: WOUND CARE | Facility: HOSPITAL | Age: 71
End: 2021-11-16
Payer: MEDICARE

## 2021-11-16 VITALS
DIASTOLIC BLOOD PRESSURE: 87 MMHG | HEART RATE: 89 BPM | RESPIRATION RATE: 16 BRPM | HEIGHT: 75 IN | OXYGEN SATURATION: 100 % | BODY MASS INDEX: 27.35 KG/M2 | SYSTOLIC BLOOD PRESSURE: 157 MMHG | WEIGHT: 220 LBS | TEMPERATURE: 97.1 F

## 2021-11-16 DIAGNOSIS — E11.621 TYPE 2 DIABETES MELLITUS WITH FOOT ULCER: ICD-10-CM

## 2021-11-16 DIAGNOSIS — Z85.820 PERSONAL HISTORY OF MALIGNANT MELANOMA OF SKIN: ICD-10-CM

## 2021-11-16 DIAGNOSIS — G62.9 POLYNEUROPATHY, UNSPECIFIED: ICD-10-CM

## 2021-11-16 DIAGNOSIS — I48.91 UNSPECIFIED ATRIAL FIBRILLATION: ICD-10-CM

## 2021-11-16 DIAGNOSIS — Z85.828 PERSONAL HISTORY OF OTHER MALIGNANT NEOPLASM OF SKIN: Chronic | ICD-10-CM

## 2021-11-16 DIAGNOSIS — M86.672 OTHER CHRONIC OSTEOMYELITIS, LEFT ANKLE AND FOOT: ICD-10-CM

## 2021-11-16 DIAGNOSIS — Z87.828 PERSONAL HISTORY OF OTHER (HEALED) PHYSICAL INJURY AND TRAUMA: ICD-10-CM

## 2021-11-16 DIAGNOSIS — L84 CORNS AND CALLOSITIES: ICD-10-CM

## 2021-11-16 DIAGNOSIS — L89.893 PRESSURE ULCER OF OTHER SITE, STAGE 3: ICD-10-CM

## 2021-11-16 DIAGNOSIS — Z84.0 FAMILY HISTORY OF DISEASES OF THE SKIN AND SUBCUTANEOUS TISSUE: ICD-10-CM

## 2021-11-16 DIAGNOSIS — Z82.49 FAMILY HISTORY OF ISCHEMIC HEART DISEASE AND OTHER DISEASES OF THE CIRCULATORY SYSTEM: ICD-10-CM

## 2021-11-16 DIAGNOSIS — Z79.899 OTHER LONG TERM (CURRENT) DRUG THERAPY: ICD-10-CM

## 2021-11-16 DIAGNOSIS — I11.9 HYPERTENSIVE HEART DISEASE WITHOUT HEART FAILURE: ICD-10-CM

## 2021-11-16 DIAGNOSIS — Z85.828 PERSONAL HISTORY OF OTHER MALIGNANT NEOPLASM OF SKIN: ICD-10-CM

## 2021-11-16 PROCEDURE — G0463: CPT

## 2021-11-16 PROCEDURE — 99213 OFFICE O/P EST LOW 20 MIN: CPT

## 2021-11-16 NOTE — ASSESSMENT
[Verbal] : Verbal [Written] : Written [Demo] : Demo [Patient] : Patient [Good - alert, interested, motivated] : Good - alert, interested, motivated [Verbalizes knowledge/Understanding] : Verbalizes knowledge/understanding [Dressing changes] : dressing changes [Foot Care] : foot care [Skin Care] : skin care [Signs and symptoms of infection] : sign and symptoms of infection [Nutrition] : nutrition [How and When to Call] : how and when to call [Off-loading] : off-loading [Patient responsibility to plan of care] : patient responsibility to plan of care [] : Yes [Stable] : stable [Home] : Home [Ambulatory] : Ambulatory [Not Applicable - Long Term Care/Home Health Agency] : Long Term Care/Home Health Agency: Not Applicable [FreeTextEntry2] : Infection prevention \par Wound care (dressing changes)\par Maintain optimal skin integrity to high pressure areas\par  [FreeTextEntry3] : Wound is closed [FreeTextEntry4] : Pt was IP @ [facility]. 11/3/21 - 11/11/21 for a blood infection. Pt produced 3 negative blood cultures, and was discharged on I.V. abt (Daptomycin 400mg) via midline. \par Pt reports having 2 weeks left of I.V. antibiotics, tolerating abt therapy without incident.\par F/u in 2 weeks.

## 2021-11-16 NOTE — PLAN
[FreeTextEntry1] : continue off loading and protective dressing  Spent 20 minutes for patient care and medical decision making.\par

## 2021-11-16 NOTE — PHYSICAL EXAM
[Normal Breath Sounds] : Normal breath sounds [1+] : left 1+ [No Rash or Lesion] : No rash or lesion [Purpura] : no purpura  [Petechiae] : no petechiae [Skin Ulcer] : ulcer [Alert] : alert [Oriented to Person] : oriented to person [Oriented to Place] : oriented to place [Oriented to Time] : oriented to time [Calm] : calm [de-identified] : comfortable  [de-identified] : HTN , A-fib [de-identified] : forefoot deformity of the toes , + OM on x ray findings  [de-identified] : Stage 3 pressure ulcer plantar left 5th metatarsal , skin, subcutaneous and fat , currently closed  [de-identified] : peripheral neuropathy  [FreeTextEntry1] : Left Foot 5th metatarsal - RESOLVED [de-identified] : White sock [TWNoteComboBox4] : None [TWNoteComboBox6] : Surgical [de-identified] : No [de-identified] : Normal [de-identified] : None [de-identified] : None [de-identified] : None

## 2021-11-30 ENCOUNTER — RESULT REVIEW (OUTPATIENT)
Age: 71
End: 2021-11-30

## 2021-11-30 ENCOUNTER — OUTPATIENT (OUTPATIENT)
Dept: OUTPATIENT SERVICES | Facility: HOSPITAL | Age: 71
LOS: 1 days | Discharge: HOME | End: 2021-11-30
Payer: COMMERCIAL

## 2021-11-30 ENCOUNTER — NON-APPOINTMENT (OUTPATIENT)
Age: 71
End: 2021-11-30

## 2021-11-30 ENCOUNTER — APPOINTMENT (OUTPATIENT)
Dept: WOUND CARE | Facility: HOSPITAL | Age: 71
End: 2021-11-30
Payer: MEDICARE

## 2021-11-30 VITALS
HEIGHT: 75 IN | OXYGEN SATURATION: 100 % | DIASTOLIC BLOOD PRESSURE: 82 MMHG | SYSTOLIC BLOOD PRESSURE: 142 MMHG | WEIGHT: 220 LBS | BODY MASS INDEX: 27.35 KG/M2 | HEART RATE: 97 BPM | TEMPERATURE: 97.8 F | RESPIRATION RATE: 18 BRPM

## 2021-11-30 DIAGNOSIS — G62.9 POLYNEUROPATHY, UNSPECIFIED: ICD-10-CM

## 2021-11-30 DIAGNOSIS — Z85.828 PERSONAL HISTORY OF OTHER MALIGNANT NEOPLASM OF SKIN: Chronic | ICD-10-CM

## 2021-11-30 DIAGNOSIS — E11.621 TYPE 2 DIABETES MELLITUS WITH FOOT ULCER: ICD-10-CM

## 2021-11-30 PROCEDURE — G0463: CPT

## 2021-11-30 PROCEDURE — 99213 OFFICE O/P EST LOW 20 MIN: CPT

## 2021-11-30 NOTE — PLAN
[FreeTextEntry1] : Patient still on PICC line antibiotics , continue off loading and observation , PTR 2 weeks   Spent 20 minutes for patient care and medical decision making.\par

## 2021-11-30 NOTE — PHYSICAL EXAM
[Normal Breath Sounds] : Normal breath sounds [1+] : left 1+ [No Rash or Lesion] : No rash or lesion [Purpura] : no purpura  [Petechiae] : no petechiae [Skin Ulcer] : ulcer [Alert] : alert [Oriented to Person] : oriented to person [Oriented to Place] : oriented to place [Oriented to Time] : oriented to time [Calm] : calm [de-identified] : comfortable  [de-identified] : HTN , A-fib [de-identified] : forefoot deformity of the toes , + OM on x ray findings  [de-identified] : Stage 3 pressure ulcer plantar left 5th metatarsal , skin, subcutaneous and fat , currently closed  [de-identified] : peripheral neuropathy  [FreeTextEntry1] : Left foot 5th metatarsal - mild - no open wound - mild callus [de-identified] : No treatment

## 2021-11-30 NOTE — ASSESSMENT
[Verbal] : Verbal [Patient] : Patient [Good - alert, interested, motivated] : Good - alert, interested, motivated [Verbalizes knowledge/Understanding] : Verbalizes knowledge/understanding [Foot Care] : foot care [Skin Care] : skin care [Signs and symptoms of infection] : sign and symptoms of infection [Surgery] : surgery [How and When to Call] : how and when to call [Labs and Tests] : labs and tests [Off-loading] : off-loading [Patient responsibility to plan of care] : patient responsibility to plan of care [] : Yes [Stable] : stable [Home] : Home [Ambulatory] : Ambulatory [Not Applicable - Long Term Care/Home Health Agency] : Long Term Care/Home Health Agency: Not Applicable [FreeTextEntry2] : Maintain optimal skin integrity, diagnostic testing [FreeTextEntry4] : \par Patient reports that he has 2 weeks left of IV antibiotic (Daptomycin) for tx of blood infection.\par Xray ordered for next visit.  DPM instructed patient to have xray done next visit prior to assessment.\par DPM to order MRI after patient discontinues IV antibiotic.\par f/u 2 weeks

## 2021-12-01 DIAGNOSIS — Z85.828 PERSONAL HISTORY OF OTHER MALIGNANT NEOPLASM OF SKIN: ICD-10-CM

## 2021-12-01 DIAGNOSIS — L89.893 PRESSURE ULCER OF OTHER SITE, STAGE 3: ICD-10-CM

## 2021-12-01 DIAGNOSIS — M86.672 OTHER CHRONIC OSTEOMYELITIS, LEFT ANKLE AND FOOT: ICD-10-CM

## 2021-12-01 DIAGNOSIS — L84 CORNS AND CALLOSITIES: ICD-10-CM

## 2021-12-01 DIAGNOSIS — K57.32 DIVERTICULITIS OF LARGE INTESTINE WITHOUT PERFORATION OR ABSCESS WITHOUT BLEEDING: ICD-10-CM

## 2021-12-01 DIAGNOSIS — Z82.49 FAMILY HISTORY OF ISCHEMIC HEART DISEASE AND OTHER DISEASES OF THE CIRCULATORY SYSTEM: ICD-10-CM

## 2021-12-01 DIAGNOSIS — I11.9 HYPERTENSIVE HEART DISEASE WITHOUT HEART FAILURE: ICD-10-CM

## 2021-12-01 DIAGNOSIS — Z87.828 PERSONAL HISTORY OF OTHER (HEALED) PHYSICAL INJURY AND TRAUMA: ICD-10-CM

## 2021-12-01 DIAGNOSIS — Z85.820 PERSONAL HISTORY OF MALIGNANT MELANOMA OF SKIN: ICD-10-CM

## 2021-12-01 DIAGNOSIS — Z84.0 FAMILY HISTORY OF DISEASES OF THE SKIN AND SUBCUTANEOUS TISSUE: ICD-10-CM

## 2021-12-01 DIAGNOSIS — I48.91 UNSPECIFIED ATRIAL FIBRILLATION: ICD-10-CM

## 2021-12-01 DIAGNOSIS — Z79.899 OTHER LONG TERM (CURRENT) DRUG THERAPY: ICD-10-CM

## 2021-12-01 DIAGNOSIS — G62.9 POLYNEUROPATHY, UNSPECIFIED: ICD-10-CM

## 2021-12-08 ENCOUNTER — RX RENEWAL (OUTPATIENT)
Age: 71
End: 2021-12-08

## 2021-12-08 ENCOUNTER — APPOINTMENT (OUTPATIENT)
Dept: CARDIOLOGY | Facility: CLINIC | Age: 71
End: 2021-12-08
Payer: MEDICARE

## 2021-12-08 ENCOUNTER — NON-APPOINTMENT (OUTPATIENT)
Age: 71
End: 2021-12-08

## 2021-12-08 VITALS — DIASTOLIC BLOOD PRESSURE: 88 MMHG | SYSTOLIC BLOOD PRESSURE: 146 MMHG

## 2021-12-08 VITALS — HEIGHT: 75 IN | HEART RATE: 87 BPM | WEIGHT: 215 LBS | OXYGEN SATURATION: 97 % | BODY MASS INDEX: 26.73 KG/M2

## 2021-12-08 DIAGNOSIS — I48.91 UNSPECIFIED ATRIAL FIBRILLATION: ICD-10-CM

## 2021-12-08 PROCEDURE — 99214 OFFICE O/P EST MOD 30 MIN: CPT

## 2021-12-08 PROCEDURE — 93000 ELECTROCARDIOGRAM COMPLETE: CPT

## 2021-12-08 NOTE — HISTORY OF PRESENT ILLNESS
[FreeTextEntry1] : Celestine is a 71-year-old male here for follow up.\par \par I last saw him in 9/21.\par His past medical history is notable for neuropathy/back surgery, a right foot ulcer, melanoma status post resection, and hypertension. 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 He was found to have OM, and finished abx through a picc line.\par \par He has a history of atrial fibrillation, status post cardioversion in the past in 2019. After a resection of a melanoma, in the recovery room he was noted to be in asymptomatic AF.  He was anticoagulated, rate controlled.  Echo unremarkable.  Noted to have brief wct and so had a nuclear stress, which revealed a small area of inferolateral ischemia, which was decided to be managed conservatively.  YANICK DCCV was performed, and he was discharged in SR.\par \par Prior to last visit, he tripped and fell, and broke many vertebrae in his back, and needed emergent neurosurgery. He was eventually discharged to rehab and had a slow recovery, complicated by a wound on his foot. He was finally discharged from rehab in August. At last visit, he reported dyspnea on exertion. He had a pharmacologic stress test in April, prior to this, that did not demonstrate any ischemia Echocardiogram in 2021 demonstrated a overall preserved EF, though with mild distal inferior wall hypokinesis. I repeat his echocardiogram at last visit, and no wall motion abnormality was noted.\par \par On Novemeber 7th, he presented to [facility] ER with a fever of 105 and chills. He was found to have a MRSA bacteremia, and is still on antibiotics. They are not sure where it came from, though a YANICK was negative. He is currently on Daptomycin. \par \par Breathing wise, he is doing better. He is still short of breath here or there when going up steps. His swelling is gone, and he is taking lasix 1x/day.\par

## 2021-12-08 NOTE — PHYSICAL EXAM
[Well Developed] : well developed [Well Nourished] : well nourished [No Acute Distress] : no acute distress [Normal Conjunctiva] : normal conjunctiva [Normal Venous Pressure] : normal venous pressure [No Carotid Bruit] : no carotid bruit [Normal Rate] : normal [Irregularly Irregular] : irregularly irregular [Normal S1] : normal S1 [Normal S2] : normal S2 [No Murmur] : no murmurs heard [___ +] : bilateral [unfilled]U+ pretibial pitting edema [2+] : left 2+ [No Abnormalities] : the abdominal aorta was not enlarged and no bruit was heard [Good Air Entry] : good air entry [No Respiratory Distress] : no respiratory distress  [Soft] : abdomen soft [Non Tender] : non-tender [No Masses/organomegaly] : no masses/organomegaly [Normal Bowel Sounds] : normal bowel sounds [Normal Gait] : normal gait [No Cyanosis] : no cyanosis [No Clubbing] : no clubbing [No Varicosities] : no varicosities [No Rash] : no rash [No Skin Lesions] : no skin lesions [Moves all extremities] : moves all extremities [No Focal Deficits] : no focal deficits [Normal Speech] : normal speech [Alert and Oriented] : alert and oriented [Normal memory] : normal memory [S3] : no S3 [S4] : no S4 [Right Carotid Bruit] : no bruit heard over the right carotid [Left Carotid Bruit] : no bruit heard over the left carotid [Right Femoral Bruit] : no bruit heard over the right femoral artery [Left Femoral Bruit] : no bruit heard over the left femoral artery [de-identified] : decreased bs at right base to mid lung. [de-identified] : trace pitting edema

## 2021-12-08 NOTE — DISCUSSION/SUMMARY
[___ Month(s)] : in [unfilled] month(s) [FreeTextEntry1] : Celestine recently recovered from a fall and neurosurgery. More recently, he was admitted with fevers, and found to have a MRSA bacteremia. \par \par He has recovered well, though does have some rare dyspnea on exertion. \par \par His blood pressure is a little elevated. EKG continues to demonstrate rate controlled atrial fibrillation. \par \par His breathing and edema have improved, and he will remain on low dose Lasix. An echocardiogram in the past has demonstrated mild inferior wall hypokinesis but more recently, his echo was considered normal. I am going to request his most recent [place] records regarding his YANICK.\par \par He did have a stress test in 2018, that demonstrated inferolateral ischemia, which was not seen on a stress test in our office this year. We will re-evaluate his symptoms once he recovers from his bacteremia. \par \par He will remain on Toprol XL and anticoagulation. He will monitor his BP at home.  I will see him again in 3-4 months.

## 2021-12-21 ENCOUNTER — OUTPATIENT (OUTPATIENT)
Dept: OUTPATIENT SERVICES | Facility: HOSPITAL | Age: 71
LOS: 1 days | Discharge: HOME | End: 2021-12-21
Payer: COMMERCIAL

## 2021-12-21 ENCOUNTER — APPOINTMENT (OUTPATIENT)
Dept: WOUND CARE | Facility: HOSPITAL | Age: 71
End: 2021-12-21
Payer: MEDICARE

## 2021-12-21 DIAGNOSIS — Z85.828 PERSONAL HISTORY OF OTHER MALIGNANT NEOPLASM OF SKIN: Chronic | ICD-10-CM

## 2021-12-21 DIAGNOSIS — E11.621 TYPE 2 DIABETES MELLITUS WITH FOOT ULCER: ICD-10-CM

## 2021-12-21 PROCEDURE — 73630 X-RAY EXAM OF FOOT: CPT

## 2021-12-21 PROCEDURE — 99213 OFFICE O/P EST LOW 20 MIN: CPT

## 2021-12-21 PROCEDURE — 73630 X-RAY EXAM OF FOOT: CPT | Mod: 26,50

## 2021-12-21 PROCEDURE — G0463: CPT

## 2021-12-22 DIAGNOSIS — I11.9 HYPERTENSIVE HEART DISEASE WITHOUT HEART FAILURE: ICD-10-CM

## 2021-12-22 DIAGNOSIS — Z84.0 FAMILY HISTORY OF DISEASES OF THE SKIN AND SUBCUTANEOUS TISSUE: ICD-10-CM

## 2021-12-22 DIAGNOSIS — L84 CORNS AND CALLOSITIES: ICD-10-CM

## 2021-12-22 DIAGNOSIS — I48.91 UNSPECIFIED ATRIAL FIBRILLATION: ICD-10-CM

## 2021-12-22 DIAGNOSIS — M86.672 OTHER CHRONIC OSTEOMYELITIS, LEFT ANKLE AND FOOT: ICD-10-CM

## 2021-12-22 DIAGNOSIS — Z85.828 PERSONAL HISTORY OF OTHER MALIGNANT NEOPLASM OF SKIN: ICD-10-CM

## 2021-12-22 DIAGNOSIS — G62.9 POLYNEUROPATHY, UNSPECIFIED: ICD-10-CM

## 2021-12-22 DIAGNOSIS — Z79.899 OTHER LONG TERM (CURRENT) DRUG THERAPY: ICD-10-CM

## 2021-12-22 DIAGNOSIS — Z82.49 FAMILY HISTORY OF ISCHEMIC HEART DISEASE AND OTHER DISEASES OF THE CIRCULATORY SYSTEM: ICD-10-CM

## 2021-12-22 DIAGNOSIS — L89.893 PRESSURE ULCER OF OTHER SITE, STAGE 3: ICD-10-CM

## 2021-12-22 DIAGNOSIS — Z87.828 PERSONAL HISTORY OF OTHER (HEALED) PHYSICAL INJURY AND TRAUMA: ICD-10-CM

## 2021-12-22 DIAGNOSIS — Z85.820 PERSONAL HISTORY OF MALIGNANT MELANOMA OF SKIN: ICD-10-CM

## 2022-01-12 NOTE — VITALS
[Pain related to present condition?] : The patient's  pain is not related to present condition. [] : No [de-identified] : 0/10

## 2022-01-12 NOTE — PHYSICAL EXAM
[Normal Breath Sounds] : Normal breath sounds [1+] : left 1+ [No Rash or Lesion] : No rash or lesion [Skin Ulcer] : ulcer [Alert] : alert [Oriented to Person] : oriented to person [Oriented to Place] : oriented to place [Oriented to Time] : oriented to time [Calm] : calm [Purpura] : no purpura  [Petechiae] : no petechiae [de-identified] : comfortable  [de-identified] : HTN , A-fib [de-identified] : forefoot deformity of the toes , + OM on x ray findings  [de-identified] : Stage 3 pressure ulcer plantar left 5th metatarsal , skin, subcutaneous and fat , currently closed  [de-identified] : peripheral neuropathy  [FreeTextEntry1] : Foot 5th metatarsal - mild - no open wound - mild callus [de-identified] : Deysi Border for protection  [de-identified] : Cleansed with Normal saline\par  [TWNoteComboBox1] : Left

## 2022-01-12 NOTE — ASSESSMENT
[Verbal] : Verbal [Patient] : Patient [Good - alert, interested, motivated] : Good - alert, interested, motivated [Verbalizes knowledge/Understanding] : Verbalizes knowledge/understanding [Foot Care] : foot care [Skin Care] : skin care [Signs and symptoms of infection] : sign and symptoms of infection [Surgery] : surgery [How and When to Call] : how and when to call [Labs and Tests] : labs and tests [Off-loading] : off-loading [Patient responsibility to plan of care] : patient responsibility to plan of care [Stable] : stable [Home] : Home [Ambulatory] : Ambulatory [Not Applicable - Long Term Care/Home Health Agency] : Long Term Care/Home Health Agency: Not Applicable [] : No [FreeTextEntry2] : Maintain optimal skin integrity\par Diagnostic testing\par F/U 1 month  [FreeTextEntry4] : DPM reviewed xray results with the patient. Patient verbalized understanding of results. \par F/U 1 month\par \par **Vitals were inadvertently deleted from the note. Patient's vitals were within normal limits at the time of the visit.**

## 2022-01-12 NOTE — REVIEW OF SYSTEMS
[Arthralgias] : arthralgias [Joint Stiffness] : joint stiffness [Easy Bleeding] : a tendency for easy bleeding [Negative] : Endocrine [Fever] : no fever [Chills] : no chills [Eye Pain] : no eye pain [Loss Of Hearing] : no hearing loss [Shortness Of Breath] : no shortness of breath [Wheezing] : no wheezing [Abdominal Pain] : no abdominal pain [Skin Lesions] : no skin lesions [Skin Wound] : no skin wound [Anxiety] : no anxiety [FreeTextEntry5] : HTN [de-identified] : Plantar neuropathy

## 2022-01-12 NOTE — PLAN
[FreeTextEntry1] : continue off loading , observation  Spent 20 minutes for patient care and medical decision making.\par PTR 1 month

## 2022-02-15 ENCOUNTER — APPOINTMENT (OUTPATIENT)
Dept: WOUND CARE | Facility: HOSPITAL | Age: 72
End: 2022-02-15
Payer: MEDICARE

## 2022-02-15 ENCOUNTER — OUTPATIENT (OUTPATIENT)
Dept: OUTPATIENT SERVICES | Facility: HOSPITAL | Age: 72
LOS: 1 days | Discharge: HOME | End: 2022-02-15
Payer: COMMERCIAL

## 2022-02-15 VITALS
BODY MASS INDEX: 26.73 KG/M2 | WEIGHT: 215 LBS | SYSTOLIC BLOOD PRESSURE: 120 MMHG | HEIGHT: 75 IN | HEART RATE: 89 BPM | RESPIRATION RATE: 20 BRPM | OXYGEN SATURATION: 100 % | TEMPERATURE: 98.1 F | DIASTOLIC BLOOD PRESSURE: 79 MMHG

## 2022-02-15 DIAGNOSIS — M86.672 OTHER CHRONIC OSTEOMYELITIS, LEFT ANKLE AND FOOT: ICD-10-CM

## 2022-02-15 DIAGNOSIS — L89.893 PRESSURE ULCER OF OTHER SITE, STAGE 3: ICD-10-CM

## 2022-02-15 DIAGNOSIS — Z85.828 PERSONAL HISTORY OF OTHER MALIGNANT NEOPLASM OF SKIN: Chronic | ICD-10-CM

## 2022-02-15 DIAGNOSIS — L84 CORNS AND CALLOSITIES: ICD-10-CM

## 2022-02-15 PROCEDURE — 99213 OFFICE O/P EST LOW 20 MIN: CPT

## 2022-02-15 PROCEDURE — G0463: CPT

## 2022-02-15 NOTE — PHYSICAL EXAM
[1+] : left 1+ [No Rash or Lesion] : No rash or lesion [Purpura] : no purpura  [Petechiae] : no petechiae [Skin Ulcer] : ulcer [Alert] : alert [Oriented to Person] : oriented to person [Oriented to Place] : oriented to place [Oriented to Time] : oriented to time [Calm] : calm [de-identified] : A&Ox3, NAD [de-identified] : HTN , A-fib [de-identified] : forefoot deformity of the toes , + OM on x ray findings  [de-identified] : Stage 3 pressure ulcer plantar left 5th metatarsal , skin, subcutaneous and fat , currently closed  [de-identified] : peripheral neuropathy  [FreeTextEntry1] : left foot 5th plantar Metatarsal - Mild callus no open wound [de-identified] : Callus  [de-identified] : Cleansed with NS\par No other treatment  [TWNoteComboBox4] : None [de-identified] : other [de-identified] : None [de-identified] : None [de-identified] : No

## 2022-02-15 NOTE — REVIEW OF SYSTEMS
[Fever] : no fever [Chills] : no chills [Eye Pain] : no eye pain [Loss Of Hearing] : no hearing loss [Shortness Of Breath] : no shortness of breath [Wheezing] : no wheezing [Abdominal Pain] : no abdominal pain [Arthralgias] : arthralgias [Joint Stiffness] : joint stiffness [Skin Lesions] : no skin lesions [Skin Wound] : no skin wound [Anxiety] : no anxiety [Easy Bleeding] : a tendency for easy bleeding [Negative] : Endocrine [FreeTextEntry5] : HTN [de-identified] : Plantar neuropathy

## 2022-02-15 NOTE — ASSESSMENT
[Verbal] : Verbal [Written] : Written [Patient] : Patient [Good - alert, interested, motivated] : Good - alert, interested, motivated [Verbalizes knowledge/Understanding] : Verbalizes knowledge/understanding [Foot Care] : foot care [Skin Care] : skin care [Signs and symptoms of infection] : sign and symptoms of infection [How and When to Call] : how and when to call [Pain Management] : pain management [Discharge Planning] : discharge planning [Patient responsibility to plan of care] : patient responsibility to plan of care [] : Yes [Stable] : stable [Home] : Home [Ambulatory] : Ambulatory [Not Applicable - Long Term Care/Home Health Agency] : Long Term Care/Home Health Agency: Not Applicable [FreeTextEntry2] : Discharge education \par Daily feet inspections  [FreeTextEntry4] : Pt discharged

## 2022-02-16 DIAGNOSIS — Z85.820 PERSONAL HISTORY OF MALIGNANT MELANOMA OF SKIN: ICD-10-CM

## 2022-02-16 DIAGNOSIS — L89.893 PRESSURE ULCER OF OTHER SITE, STAGE 3: ICD-10-CM

## 2022-02-16 DIAGNOSIS — I11.9 HYPERTENSIVE HEART DISEASE WITHOUT HEART FAILURE: ICD-10-CM

## 2022-02-16 DIAGNOSIS — Z79.01 LONG TERM (CURRENT) USE OF ANTICOAGULANTS: ICD-10-CM

## 2022-02-16 DIAGNOSIS — I48.91 UNSPECIFIED ATRIAL FIBRILLATION: ICD-10-CM

## 2022-02-16 DIAGNOSIS — G62.9 POLYNEUROPATHY, UNSPECIFIED: ICD-10-CM

## 2022-02-16 DIAGNOSIS — Z98.890 OTHER SPECIFIED POSTPROCEDURAL STATES: ICD-10-CM

## 2022-02-16 DIAGNOSIS — Z82.49 FAMILY HISTORY OF ISCHEMIC HEART DISEASE AND OTHER DISEASES OF THE CIRCULATORY SYSTEM: ICD-10-CM

## 2022-02-16 DIAGNOSIS — Z85.828 PERSONAL HISTORY OF OTHER MALIGNANT NEOPLASM OF SKIN: ICD-10-CM

## 2022-02-16 DIAGNOSIS — Z87.828 PERSONAL HISTORY OF OTHER (HEALED) PHYSICAL INJURY AND TRAUMA: ICD-10-CM

## 2022-02-16 DIAGNOSIS — M86.672 OTHER CHRONIC OSTEOMYELITIS, LEFT ANKLE AND FOOT: ICD-10-CM

## 2022-02-16 DIAGNOSIS — Z79.899 OTHER LONG TERM (CURRENT) DRUG THERAPY: ICD-10-CM

## 2022-02-16 DIAGNOSIS — K57.32 DIVERTICULITIS OF LARGE INTESTINE WITHOUT PERFORATION OR ABSCESS WITHOUT BLEEDING: ICD-10-CM

## 2022-02-16 DIAGNOSIS — Z84.0 FAMILY HISTORY OF DISEASES OF THE SKIN AND SUBCUTANEOUS TISSUE: ICD-10-CM

## 2022-02-16 DIAGNOSIS — L84 CORNS AND CALLOSITIES: ICD-10-CM

## 2022-02-16 DIAGNOSIS — Z89.429 ACQUIRED ABSENCE OF OTHER TOE(S), UNSPECIFIED SIDE: ICD-10-CM

## 2022-02-16 DIAGNOSIS — Z86.14 PERSONAL HISTORY OF METHICILLIN RESISTANT STAPHYLOCOCCUS AUREUS INFECTION: ICD-10-CM

## 2022-03-16 ENCOUNTER — RX RENEWAL (OUTPATIENT)
Age: 72
End: 2022-03-16

## 2022-05-20 ENCOUNTER — RX RENEWAL (OUTPATIENT)
Age: 72
End: 2022-05-20

## 2022-05-26 ENCOUNTER — NON-APPOINTMENT (OUTPATIENT)
Age: 72
End: 2022-05-26

## 2022-05-26 ENCOUNTER — APPOINTMENT (OUTPATIENT)
Dept: CARDIOLOGY | Facility: CLINIC | Age: 72
End: 2022-05-26
Payer: MEDICARE

## 2022-05-26 VITALS
HEIGHT: 75 IN | HEART RATE: 84 BPM | WEIGHT: 227 LBS | SYSTOLIC BLOOD PRESSURE: 146 MMHG | OXYGEN SATURATION: 98 % | BODY MASS INDEX: 28.23 KG/M2 | DIASTOLIC BLOOD PRESSURE: 86 MMHG

## 2022-05-26 VITALS — SYSTOLIC BLOOD PRESSURE: 130 MMHG | DIASTOLIC BLOOD PRESSURE: 88 MMHG

## 2022-05-26 PROCEDURE — 99214 OFFICE O/P EST MOD 30 MIN: CPT

## 2022-05-26 PROCEDURE — 93000 ELECTROCARDIOGRAM COMPLETE: CPT

## 2022-05-26 NOTE — REASON FOR VISIT
[Arrhythmia/ECG Abnorrmalities] : arrhythmia/ECG abnormalities [Hypertension] : hypertension [Symptom and Test Evaluation] : symptom and test evaluation

## 2022-05-26 NOTE — HISTORY OF PRESENT ILLNESS
[FreeTextEntry1] : Celestine is a 71-year-old male here for follow up.\par \par I last saw him in 12/21.\par \par His past medical history is notable for neuropathy/back surgery, a right foot ulcer, melanoma status post resection, and hypertension. 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 He was found to have OM, and finished abx through a picc line.\par \par He has a history of atrial fibrillation, status post cardioversion in the past in 2019. After a resection of a melanoma, in the recovery room he was noted to be in asymptomatic AF.  He was anticoagulated, rate controlled.  Echo unremarkable.  Noted to have brief wct and so had a nuclear stress, which revealed a small area of inferolateral ischemia, which was decided to be managed conservatively.  YANICK DCCV was performed, and he was discharged in SR.\par \par Prior to last visit, he tripped and fell, and broke many vertebrae in his back, and needed emergent neurosurgery. He was eventually discharged to rehab and had a slow recovery, complicated by a wound on his foot. He was finally discharged from rehab in August. At last visit, he reported dyspnea on exertion. He had a pharmacologic stress test in April, prior to this, that did not demonstrate any ischemia Echocardiogram in 2021 demonstrated a overall preserved EF, though with mild distal inferior wall hypokinesis. I repeat his echocardiogram at last visit, and no wall motion abnormality was noted.\par \par On Novemeber 7th, he presented to [facility] ER with a fever of 105 and chills. He was found to have a MRSA bacteremia, and is still on antibiotics. They are not sure where it came from, though a YANICK was negative. He is currently on Daptomycin. \par \par He presents today in good spirits. Denies palpitations, chest pains or pressure.  \par Breathing wise, He is still short of breath here or there when going up steps. Not each time and states has not worsened over the past few months. \par His swelling improved with lasix in the past, he has since stopped taking.

## 2022-05-26 NOTE — PHYSICAL EXAM
[Well Developed] : well developed [Well Nourished] : well nourished [No Acute Distress] : no acute distress [Normal Conjunctiva] : normal conjunctiva [Normal Venous Pressure] : normal venous pressure [No Carotid Bruit] : no carotid bruit [Normal S1, S2] : normal S1, S2 [Normal] : normal [Normal Rate] : normal [Irregularly Irregular] : irregularly irregular [No Murmur] : no murmurs heard [___ +] : bilateral [unfilled]U+ pretibial pitting edema [Clear Lung Fields] : clear lung fields [Good Air Entry] : good air entry [No Respiratory Distress] : no respiratory distress  [Soft] : abdomen soft [Non Tender] : non-tender [No Masses/organomegaly] : no masses/organomegaly [Normal Bowel Sounds] : normal bowel sounds [Normal Gait] : normal gait [No Edema] : no edema [No Cyanosis] : no cyanosis [No Clubbing] : no clubbing [No Varicosities] : no varicosities [No Rash] : no rash [No Skin Lesions] : no skin lesions [Moves all extremities] : moves all extremities [No Focal Deficits] : no focal deficits [Normal Speech] : normal speech [Alert and Oriented] : alert and oriented [Normal memory] : normal memory [Right Carotid Bruit] : no bruit heard over the right carotid [Left Carotid Bruit] : no bruit heard over the left carotid

## 2022-05-26 NOTE — CARDIOLOGY SUMMARY
[de-identified] : atrial fibrillation \par 81 bpm [de-identified] : 2018 [de-identified] : 9/2021 EF 60%

## 2022-05-26 NOTE — DISCUSSION/SUMMARY
[___ Month(s)] : in [unfilled] month(s) [FreeTextEntry1] : Celestine recently recovered from a fall and neurosurgery. More recently, he was admitted with fevers, and found to have a MRSA bacteremia. \par \par He has recovered well\par He presents today in no acute distress. Mild fluid volume excess, LE edema noted.  ECG illustrates atrial fibrillation, unchanged from previous.  \par His blood pressure is normal. EKG continues to demonstrate rate controlled atrial fibrillation, he will continue metoprolol 100mg XL for continued B/P and rate control.  \par He will remain on low dose Lasix 20mg for persistent LE edema. \par He does have some rare dyspnea on exertion.. An echocardiogram in the past has demonstrated mild inferior wall hypokinesis but more recently was normal, He did also have a stress test in 2018, that demonstrated inferolateral ischemia,\par At this time, he will undergo a pharm/nuclear stress test to rule out ischemia from obstructive CAD. He will get a 2d echo to assess for any new structural heart disease, changes in valvular and ventricular function\par \par I will call him with results once testing is complete.  There after, I determine if it will be beneficial to proceed with rhythm control interventions.\par He knows to call with any issues or concerns.\par

## 2022-06-03 ENCOUNTER — NON-APPOINTMENT (OUTPATIENT)
Age: 72
End: 2022-06-03

## 2022-06-07 ENCOUNTER — APPOINTMENT (OUTPATIENT)
Dept: CARDIOLOGY | Facility: CLINIC | Age: 72
End: 2022-06-07
Payer: MEDICARE

## 2022-06-07 ENCOUNTER — NON-APPOINTMENT (OUTPATIENT)
Age: 72
End: 2022-06-07

## 2022-06-07 PROCEDURE — 93306 TTE W/DOPPLER COMPLETE: CPT

## 2022-06-08 ENCOUNTER — APPOINTMENT (OUTPATIENT)
Dept: CARDIOLOGY | Facility: CLINIC | Age: 72
End: 2022-06-08
Payer: MEDICARE

## 2022-06-08 PROCEDURE — A9500: CPT

## 2022-06-08 PROCEDURE — 78452 HT MUSCLE IMAGE SPECT MULT: CPT

## 2022-06-08 PROCEDURE — 93015 CV STRESS TEST SUPVJ I&R: CPT

## 2022-08-19 ENCOUNTER — NON-APPOINTMENT (OUTPATIENT)
Age: 72
End: 2022-08-19

## 2022-08-19 ENCOUNTER — APPOINTMENT (OUTPATIENT)
Dept: CARDIOLOGY | Facility: CLINIC | Age: 72
End: 2022-08-19

## 2022-08-19 ENCOUNTER — LABORATORY RESULT (OUTPATIENT)
Age: 72
End: 2022-08-19

## 2022-08-19 VITALS
HEART RATE: 79 BPM | BODY MASS INDEX: 27.1 KG/M2 | HEIGHT: 75 IN | OXYGEN SATURATION: 97 % | DIASTOLIC BLOOD PRESSURE: 81 MMHG | WEIGHT: 218 LBS | SYSTOLIC BLOOD PRESSURE: 125 MMHG

## 2022-08-19 PROCEDURE — 93000 ELECTROCARDIOGRAM COMPLETE: CPT

## 2022-08-19 PROCEDURE — 99214 OFFICE O/P EST MOD 30 MIN: CPT | Mod: 25

## 2022-08-19 RX ORDER — DAPTOMYCIN 350 MG/7ML
INJECTION, POWDER, LYOPHILIZED, FOR SOLUTION INTRAVENOUS AS DIRECTED
Refills: 0 | Status: DISCONTINUED | COMMUNITY
End: 2022-08-19

## 2022-08-19 NOTE — HISTORY OF PRESENT ILLNESS
[FreeTextEntry1] : Celestine is a 71-year-old male here for follow up.\par \par I last saw him in 5/22.\par \par His past medical history is notable for neuropathy/back surgery, a right foot ulcer, melanoma status post resection, and hypertension. 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 He was found to have OM, and finished abx through a picc line.\par \par He has a history of atrial fibrillation, status post cardioversion in the past in 2019. After a resection of a melanoma, in the recovery room he was noted to be in asymptomatic AF.  He was anticoagulated, rate controlled.  Echo unremarkable.  Noted to have brief wct and so had a nuclear stress, which revealed a small area of inferolateral ischemia, which was decided to be managed conservatively.  YANICK DCCV was performed, and he was discharged in SR.\par \par In 2021, he tripped and fell, and broke many vertebrae in his back, and needed emergent neurosurgery. He was eventually discharged to rehab and had a slow recovery, complicated by a wound on his foot. He was finally discharged from rehab in August. At last visit, he reported dyspnea on exertion. He had a pharmacologic stress test in April, prior to this, that did not demonstrate any ischemia Echocardiogram in 2021 demonstrated a overall preserved EF, though with mild distal inferior wall hypokinesis. I repeat his echocardiogram at last visit, and no wall motion abnormality was noted.\par \par On Novemeber 7th 2021, he presented to [facility] ER with a fever of 105 and chills. He was found to have a MRSA bacteremia, and is still on antibiotics. They are not sure where it came from, though a YANICK was negative. He is currently on Daptomycin. \par \par At last visit, he was reporting dyspnea on exertion, and walking up steps.  A repeat echocardiogram showed normal left ventricular systolic function, mild MR, mild AI, mildly dilated left atrium, and mildly elevated pulmonary pressures.  A pharmacologic stress test did have an inferior wall defect, though no obvious ischemia.\par \par He continues to report similar symptoms, though generally unchanged.\par

## 2022-08-19 NOTE — DISCUSSION/SUMMARY
[___ Month(s)] : in [unfilled] month(s) [FreeTextEntry1] : Celestine continues to report dyspnea on exertion, despite an unchanged echocardiogram, and equivocal stress test in 2022.  He has had inferior wall hypokinesis on an echocardiogram in the past, and inferolateral wall ischemia on a prior stress test, though this was not noted on the current study.\par \par Given the fact that he continues to report dyspnea, he is going to have a coronary CTA to evaluate for obstructive CAD.  He is in rate controlled atrial fibrillation today, and his edema has basically resolved.  I am also sending him for a full set of blood work.\par His blood pressure is at goal, and he will remain on metoprolol along with a diuretic.\par It does not seem that his rate controlled atrial fibrillation is the cause of his symptoms.\par \par We will speak after the above testing, and decide if further evaluation is necessary.  He knows to call with any issues or concerns.\par  [EKG obtained to assist in diagnosis and management of assessed problem(s)] : EKG obtained to assist in diagnosis and management of assessed problem(s)

## 2022-08-19 NOTE — CARDIOLOGY SUMMARY
[de-identified] : atrial fibrillation \par 81 bpm [de-identified] : 2018 [de-identified] : 9/2021 EF 60%

## 2022-08-22 LAB
25(OH)D3 SERPL-MCNC: 55.6 NG/ML
ALBUMIN SERPL ELPH-MCNC: 4.5 G/DL
ALP BLD-CCNC: 103 U/L
ALT SERPL-CCNC: 18 U/L
ANION GAP SERPL CALC-SCNC: 12 MMOL/L
APPEARANCE: CLEAR
AST SERPL-CCNC: 22 U/L
BACTERIA: NEGATIVE
BASOPHILS # BLD AUTO: 0.04 K/UL
BASOPHILS NFR BLD AUTO: 0.8 %
BILIRUB SERPL-MCNC: 0.7 MG/DL
BILIRUBIN URINE: NEGATIVE
BLOOD URINE: NORMAL
BUN SERPL-MCNC: 15 MG/DL
CALCIUM SERPL-MCNC: 9.7 MG/DL
CHLORIDE SERPL-SCNC: 101 MMOL/L
CHOLEST SERPL-MCNC: 148 MG/DL
CO2 SERPL-SCNC: 24 MMOL/L
COLOR: YELLOW
CREAT SERPL-MCNC: 1.24 MG/DL
EGFR: 62 ML/MIN/1.73M2
EOSINOPHIL # BLD AUTO: 0.16 K/UL
EOSINOPHIL NFR BLD AUTO: 3 %
ESTIMATED AVERAGE GLUCOSE: 111 MG/DL
GLUCOSE QUALITATIVE U: NEGATIVE
GLUCOSE SERPL-MCNC: 78 MG/DL
HBA1C MFR BLD HPLC: 5.5 %
HCT VFR BLD CALC: 48.1 %
HDLC SERPL-MCNC: 46 MG/DL
HGB BLD-MCNC: 15.9 G/DL
HYALINE CASTS: 0 /LPF
IMM GRANULOCYTES NFR BLD AUTO: 0.4 %
KETONES URINE: NEGATIVE
LDLC SERPL CALC-MCNC: 89 MG/DL
LEUKOCYTE ESTERASE URINE: NEGATIVE
LYMPHOCYTES # BLD AUTO: 1.39 K/UL
LYMPHOCYTES NFR BLD AUTO: 26.1 %
MAN DIFF?: NORMAL
MCHC RBC-ENTMCNC: 30.9 PG
MCHC RBC-ENTMCNC: 33.1 GM/DL
MCV RBC AUTO: 93.4 FL
MICROSCOPIC-UA: NORMAL
MONOCYTES # BLD AUTO: 0.68 K/UL
MONOCYTES NFR BLD AUTO: 12.8 %
NEUTROPHILS # BLD AUTO: 3.03 K/UL
NEUTROPHILS NFR BLD AUTO: 56.9 %
NITRITE URINE: NEGATIVE
NONHDLC SERPL-MCNC: 102 MG/DL
NT-PROBNP SERPL-MCNC: 758 PG/ML
PH URINE: 6
PLATELET # BLD AUTO: 156 K/UL
POTASSIUM SERPL-SCNC: 5.2 MMOL/L
PROT SERPL-MCNC: 7.4 G/DL
PROTEIN URINE: NEGATIVE
RBC # BLD: 5.15 M/UL
RBC # FLD: 13.3 %
RED BLOOD CELLS URINE: 3 /HPF
SODIUM SERPL-SCNC: 137 MMOL/L
SPECIFIC GRAVITY URINE: 1.01
SQUAMOUS EPITHELIAL CELLS: 0 /HPF
TRIGL SERPL-MCNC: 64 MG/DL
TSH SERPL-ACNC: 1.68 UIU/ML
UROBILINOGEN URINE: NORMAL
WBC # FLD AUTO: 5.32 K/UL
WHITE BLOOD CELLS URINE: 2 /HPF

## 2022-08-29 ENCOUNTER — RX RENEWAL (OUTPATIENT)
Age: 72
End: 2022-08-29

## 2022-08-31 ENCOUNTER — RESULT REVIEW (OUTPATIENT)
Age: 72
End: 2022-08-31

## 2022-09-01 ENCOUNTER — APPOINTMENT (OUTPATIENT)
Dept: CT IMAGING | Facility: CLINIC | Age: 72
End: 2022-09-01

## 2022-09-01 ENCOUNTER — OUTPATIENT (OUTPATIENT)
Dept: OUTPATIENT SERVICES | Facility: HOSPITAL | Age: 72
LOS: 1 days | End: 2022-09-01
Payer: COMMERCIAL

## 2022-09-01 DIAGNOSIS — Z85.828 PERSONAL HISTORY OF OTHER MALIGNANT NEOPLASM OF SKIN: Chronic | ICD-10-CM

## 2022-09-01 DIAGNOSIS — I25.10 ATHEROSCLEROTIC HEART DISEASE OF NATIVE CORONARY ARTERY WITHOUT ANGINA PECTORIS: ICD-10-CM

## 2022-09-01 DIAGNOSIS — I48.91 UNSPECIFIED ATRIAL FIBRILLATION: ICD-10-CM

## 2022-09-01 DIAGNOSIS — R06.00 DYSPNEA, UNSPECIFIED: ICD-10-CM

## 2022-09-01 PROCEDURE — 75574 CT ANGIO HRT W/3D IMAGE: CPT

## 2022-09-01 PROCEDURE — 0503T: CPT

## 2022-09-01 PROCEDURE — 0502T: CPT

## 2022-09-01 PROCEDURE — 0504T: CPT

## 2022-09-01 PROCEDURE — 75574 CT ANGIO HRT W/3D IMAGE: CPT | Mod: 26

## 2022-09-15 DIAGNOSIS — M25.561 PAIN IN RIGHT KNEE: ICD-10-CM

## 2022-09-28 NOTE — DISCHARGE NOTE PROVIDER - NSDCMRMEDTOKEN_GEN_ALL_CORE_FT
Please send pt delivery is set for tomorrow 9/29.  Thank you.    
amLODIPine 5 mg oral tablet: 1 tab(s) orally once a day  cefpodoxime 200 mg oral tablet: 1 tab(s) orally every 12 hours   Metoprolol Succinate  mg oral tablet, extended release: 1 tab(s) orally once a day   rivaroxaban 20 mg oral tablet: 1 tab(s) orally once a day (before a meal)  Vitamin D3 50,000 intl units (1250 mcg) oral capsule: 1 cap(s) orally once a week on Wednesday

## 2022-10-03 ENCOUNTER — APPOINTMENT (OUTPATIENT)
Dept: ORTHOPEDIC SURGERY | Facility: CLINIC | Age: 72
End: 2022-10-03

## 2022-12-21 ENCOUNTER — RX RENEWAL (OUTPATIENT)
Age: 72
End: 2022-12-21

## 2022-12-21 ENCOUNTER — NON-APPOINTMENT (OUTPATIENT)
Age: 72
End: 2022-12-21

## 2022-12-21 ENCOUNTER — APPOINTMENT (OUTPATIENT)
Dept: CARDIOLOGY | Facility: CLINIC | Age: 72
End: 2022-12-21

## 2022-12-21 VITALS
SYSTOLIC BLOOD PRESSURE: 98 MMHG | DIASTOLIC BLOOD PRESSURE: 70 MMHG | WEIGHT: 218 LBS | BODY MASS INDEX: 27.1 KG/M2 | HEART RATE: 69 BPM | HEIGHT: 75 IN | OXYGEN SATURATION: 100 %

## 2022-12-21 DIAGNOSIS — I48.20 CHRONIC ATRIAL FIBRILLATION, UNSP: ICD-10-CM

## 2022-12-21 PROCEDURE — 93000 ELECTROCARDIOGRAM COMPLETE: CPT

## 2022-12-21 PROCEDURE — 99214 OFFICE O/P EST MOD 30 MIN: CPT | Mod: 25

## 2022-12-21 NOTE — HISTORY OF PRESENT ILLNESS
[FreeTextEntry1] : Celestine is a 72-year-old male here for follow up.\par \par I last saw him in 8/22.\par \par His past medical history is notable for neuropathy/back surgery, a right foot ulcer, melanoma status post resection, and hypertension. 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 He was found to have OM, and finished abx through a picc line.\par \par He has a history of atrial fibrillation, status post cardioversion in the past in 2019. After a resection of a melanoma, in the recovery room he was noted to be in asymptomatic AF.  He was anticoagulated, rate controlled.  Echo unremarkable.  Noted to have brief wct and so had a nuclear stress, which revealed a small area of inferolateral ischemia, which was decided to be managed conservatively.  YANICK DCCV was performed, and he was discharged in SR.\par \par In 2021, he tripped and fell, and broke many vertebrae in his back, and needed emergent neurosurgery. He was eventually discharged to rehab and had a slow recovery, complicated by a wound on his foot. He was finally discharged from rehab in August. At last visit, he reported dyspnea on exertion. He had a pharmacologic stress test in April, prior to this, that did not demonstrate any ischemia Echocardiogram in 2021 demonstrated a overall preserved EF, though with mild distal inferior wall hypokinesis. I repeated his echocardiogram at last visit, and no wall motion abnormality was noted.\par \par On Novemeber 7th 2021, he presented to [facility] ER with a fever of 105 and chills. He was found to have a MRSA bacteremia, and is still on antibiotics. They are not sure where it came from, though a YANICK was negative. He is currently on Daptomycin. \par \par Earlier this year, he was reporting dyspnea on exertion, with walking up steps.  A repeat echocardiogram showed normal left ventricular systolic function, mild MR, mild AI, mildly dilated left atrium, and mildly elevated pulmonary pressures.  A pharmacologic stress test did have an inferior wall defect, though no obvious ischemia.\par \par Because of continued symptoms, I set him up for a coronary CTA.  He had a significant calcium burden, with a score of 1065.  Calcified plaque was noted in the LAD, circumflex, and RCA, resulting up to 30 to 40% narrowing.  FFR was negative.\par \par Overall, he is doing well.  He has no chest pain, but continues have dyspnea when going up steps.  He has no significant palpitations.\par

## 2022-12-21 NOTE — CARDIOLOGY SUMMARY
[de-identified] : atrial fibrillation \par 81 bpm [de-identified] : 2018 [de-identified] : 9/2021 EF 60%

## 2022-12-21 NOTE — DISCUSSION/SUMMARY
[___ Month(s)] : in [unfilled] month(s) [FreeTextEntry1] : Celestine continues to some report dyspnea on exertion, despite an unchanged echocardiogram, and equivocal stress test in 2022.  He has had inferior wall hypokinesis on an echocardiogram in the past, and inferolateral wall ischemia on a prior stress test, though this was not noted on the current study. A recent CTA coronaries demonstrated a notable plaque burden, though no obvious obstructive disease.\par \par He believes that his symptoms may be from his atrial fibrillation, and I have agreed to consider a repeat cardioversion to get him back into sinus rhythm.  He remains in rate controlled atrial fibrillation today, though his edema has basically resolved.\par \par He will remain on metoprolol, Xarelto, and a diuretic as needed.  Though his LDL is not far from goal, I am adding atorvastatin 10, in the setting of CAD\par \par I will bring him back 2 weeks after the cardioversion, to see how he is feeling, and if he has maintained sinus rhythm.\par  [EKG obtained to assist in diagnosis and management of assessed problem(s)] : EKG obtained to assist in diagnosis and management of assessed problem(s)

## 2022-12-29 DIAGNOSIS — Z20.822 CONTACT WITH AND (SUSPECTED) EXPOSURE TO COVID-19: ICD-10-CM

## 2023-01-06 ENCOUNTER — OUTPATIENT (OUTPATIENT)
Dept: OUTPATIENT SERVICES | Facility: HOSPITAL | Age: 73
LOS: 1 days | End: 2023-01-06
Payer: COMMERCIAL

## 2023-01-06 VITALS
RESPIRATION RATE: 16 BRPM | OXYGEN SATURATION: 98 % | DIASTOLIC BLOOD PRESSURE: 68 MMHG | SYSTOLIC BLOOD PRESSURE: 122 MMHG | HEART RATE: 60 BPM

## 2023-01-06 VITALS
HEIGHT: 75 IN | HEART RATE: 75 BPM | DIASTOLIC BLOOD PRESSURE: 84 MMHG | RESPIRATION RATE: 16 BRPM | WEIGHT: 210.1 LBS | TEMPERATURE: 98 F | OXYGEN SATURATION: 100 % | SYSTOLIC BLOOD PRESSURE: 155 MMHG

## 2023-01-06 DIAGNOSIS — Z85.828 PERSONAL HISTORY OF OTHER MALIGNANT NEOPLASM OF SKIN: Chronic | ICD-10-CM

## 2023-01-06 DIAGNOSIS — I48.91 UNSPECIFIED ATRIAL FIBRILLATION: ICD-10-CM

## 2023-01-06 LAB
ALBUMIN SERPL ELPH-MCNC: 4.3 G/DL — SIGNIFICANT CHANGE UP (ref 3.3–5)
ALP SERPL-CCNC: 94 U/L — SIGNIFICANT CHANGE UP (ref 40–120)
ALT FLD-CCNC: 20 U/L — SIGNIFICANT CHANGE UP (ref 10–45)
ANION GAP SERPL CALC-SCNC: 14 MMOL/L — SIGNIFICANT CHANGE UP (ref 5–17)
APTT BLD: 38.2 SEC — HIGH (ref 27.5–35.5)
AST SERPL-CCNC: 26 U/L — SIGNIFICANT CHANGE UP (ref 10–40)
BILIRUB SERPL-MCNC: 0.6 MG/DL — SIGNIFICANT CHANGE UP (ref 0.2–1.2)
BUN SERPL-MCNC: 22 MG/DL — SIGNIFICANT CHANGE UP (ref 7–23)
CALCIUM SERPL-MCNC: 9.8 MG/DL — SIGNIFICANT CHANGE UP (ref 8.4–10.5)
CHLORIDE SERPL-SCNC: 103 MMOL/L — SIGNIFICANT CHANGE UP (ref 96–108)
CO2 SERPL-SCNC: 20 MMOL/L — LOW (ref 22–31)
CREAT SERPL-MCNC: 1.22 MG/DL — SIGNIFICANT CHANGE UP (ref 0.5–1.3)
EGFR: 63 ML/MIN/1.73M2 — SIGNIFICANT CHANGE UP
GLUCOSE SERPL-MCNC: 87 MG/DL — SIGNIFICANT CHANGE UP (ref 70–99)
HCT VFR BLD CALC: 47.1 % — SIGNIFICANT CHANGE UP (ref 39–50)
HGB BLD-MCNC: 15.6 G/DL — SIGNIFICANT CHANGE UP (ref 13–17)
INR BLD: 1.46 RATIO — HIGH (ref 0.88–1.16)
MCHC RBC-ENTMCNC: 31.3 PG — SIGNIFICANT CHANGE UP (ref 27–34)
MCHC RBC-ENTMCNC: 33.1 GM/DL — SIGNIFICANT CHANGE UP (ref 32–36)
MCV RBC AUTO: 94.6 FL — SIGNIFICANT CHANGE UP (ref 80–100)
NRBC # BLD: 0 /100 WBCS — SIGNIFICANT CHANGE UP (ref 0–0)
PLATELET # BLD AUTO: 147 K/UL — LOW (ref 150–400)
POTASSIUM SERPL-MCNC: 4.2 MMOL/L — SIGNIFICANT CHANGE UP (ref 3.5–5.3)
POTASSIUM SERPL-SCNC: 4.2 MMOL/L — SIGNIFICANT CHANGE UP (ref 3.5–5.3)
PROT SERPL-MCNC: 7.3 G/DL — SIGNIFICANT CHANGE UP (ref 6–8.3)
PROTHROM AB SERPL-ACNC: 16.9 SEC — HIGH (ref 10.5–13.4)
RBC # BLD: 4.98 M/UL — SIGNIFICANT CHANGE UP (ref 4.2–5.8)
RBC # FLD: 13.2 % — SIGNIFICANT CHANGE UP (ref 10.3–14.5)
SARS-COV-2 N GENE NPH QL NAA+PROBE: NOT DETECTED
SODIUM SERPL-SCNC: 137 MMOL/L — SIGNIFICANT CHANGE UP (ref 135–145)
WBC # BLD: 4.75 K/UL — SIGNIFICANT CHANGE UP (ref 3.8–10.5)
WBC # FLD AUTO: 4.75 K/UL — SIGNIFICANT CHANGE UP (ref 3.8–10.5)

## 2023-01-06 PROCEDURE — 92960 CARDIOVERSION ELECTRIC EXT: CPT

## 2023-01-06 PROCEDURE — 85730 THROMBOPLASTIN TIME PARTIAL: CPT

## 2023-01-06 PROCEDURE — 93010 ELECTROCARDIOGRAM REPORT: CPT

## 2023-01-06 PROCEDURE — 85610 PROTHROMBIN TIME: CPT

## 2023-01-06 PROCEDURE — 80053 COMPREHEN METABOLIC PANEL: CPT

## 2023-01-06 PROCEDURE — 93005 ELECTROCARDIOGRAM TRACING: CPT

## 2023-01-06 PROCEDURE — 85027 COMPLETE CBC AUTOMATED: CPT

## 2023-01-06 RX ORDER — SODIUM CHLORIDE 9 MG/ML
3 INJECTION INTRAMUSCULAR; INTRAVENOUS; SUBCUTANEOUS EVERY 8 HOURS
Refills: 0 | Status: DISCONTINUED | OUTPATIENT
Start: 2023-01-06 | End: 2023-01-20

## 2023-01-06 RX ORDER — ATORVASTATIN CALCIUM 80 MG/1
1 TABLET, FILM COATED ORAL
Qty: 0 | Refills: 0 | DISCHARGE

## 2023-01-06 NOTE — ASU PATIENT PROFILE, ADULT - PAIN SCALE PREFERRED, PROFILE
Detail Level: Detailed Render Post-Care Instructions In Note?: yes Duration Of Freeze Thaw-Cycle (Seconds): 0 numerical 0-10 Post-Care Instructions: I reviewed with the patient in detail post-care instructions. Recommend Vaseline multiple times per daily to areas until healed, gentle cleansing recommended. Consent: The patient's consent was verbally obtained including but not limited to risks of crusting, scabbing, blistering, scarring, darker or lighter pigmentary change, recurrence, incomplete removal and infection. Render Note In Bullet Format When Appropriate: No

## 2023-01-06 NOTE — CHART NOTE - NSCHARTNOTEFT_GEN_A_CORE
Pt presents for DCCV for his AFib with history and medications reviewed.  Hard copy H&P on chart.  Pt is alert and oriented with ECG revealing AF @75.  He does have a history of a Mechanical fall last year requiring Cervical spine surgery with hardware place.  He has limited ROM of his neck.  Anesthesia made aware.  Pt is s/p Xarelto 20mg this am.  Labs sent.  Vitals stable as below    Vital Signs Last 24 Hrs  T(C): 36.8 (06 Jan 2023 10:56), Max: 36.8 (06 Jan 2023 09:50)  T(F): 98.2 (06 Jan 2023 09:50), Max: 98.2 (06 Jan 2023 09:50)  HR: 75 (06 Jan 2023 10:56) (75 - 75)  BP: 155/84 (06 Jan 2023 10:56) (155/84 - 155/84)  BP(mean): --  RR: 16 (06 Jan 2023 10:56) (16 - 16)  SpO2: 100% (06 Jan 2023 10:56) (100% - 100%)    Parameters below as of 06 Jan 2023 09:50  Patient On (Oxygen Delivery Method): room air    Plan:  CBC and Coags resulted, BMP pending  Awaiting DCCV    SINGH Jackson-BC  Cardiology Pt presents for DCCV for his AFib with history and medications reviewed.  Hard copy H&P on chart.  Pt is alert and oriented with ECG revealing AF @75.  He does have a history of a Mechanical fall last year requiring Cervical spine surgery with hardware place.  He has limited ROM of his neck.  Anesthesia made aware.  Pt is s/p Xarelto 20mg this am.  Labs sent.  Vitals stable as below    Vital Signs Last 24 Hrs  T(C): 36.8 (06 Jan 2023 10:56), Max: 36.8 (06 Jan 2023 09:50)  T(F): 98.2 (06 Jan 2023 09:50), Max: 98.2 (06 Jan 2023 09:50)  HR: 75 (06 Jan 2023 10:56) (75 - 75)  BP: 155/84 (06 Jan 2023 10:56) (155/84 - 155/84)  BP(mean): --  RR: 16 (06 Jan 2023 10:56) (16 - 16)  SpO2: 100% (06 Jan 2023 10:56) (100% - 100%)    Parameters below as of 06 Jan 2023 09:50  Patient On (Oxygen Delivery Method): room air    PHYSICAL EXAM:    Constitutional: NAD, awake and alert, well-developed  HEENT: Moist Mucous Membranes, Anicteric  Pulmonary: Non-labored, breath sounds are clear bilaterally, No wheezing, rales or rhonchi  Cardiovascular: Irregular, S1 and S2, No murmurs, rubs, gallops or clicks  Gastrointestinal: Bowel Sounds present, soft, nontender.   Lymph: No peripheral edema. No lymphadenopathy.  Skin: No visible rashes or ulcers.  Psych:  Mood & affect appropriate    71 y/o male with pmh of AF on Metoprolol Succinate and Xarelto, BPH and HLD presents follows with Dr. Haq and presents for DCCV.       Plan:  CBC and Coags resulted, BMP pending  Awaiting DCCV    SINGH Jackson-BC  Cardiology

## 2023-01-12 NOTE — ED PROVIDER NOTE - NSCAREINITIATED _GEN_ER
SLEEP APNEA FOLLOW UP NOTE     Edel Bustos is a 67 year old female who presents for follow up management of sleep apnea.       HPI     Pt. Concerns: Patient feels greats. No issues    Type of Therapy: APAP MANAN  Pressure Settin-20 CM H20  Mask Type: Nasal   DME: HME     Compliance Download:     DATES: 30 days   % DAYS > 4 HOURS 86.7 %   Ave Usage (days used) 6 HOURS 54 minutes   95th% PRESSURE 7.6 cwp     95th% MASK LEAK 5.4 L/MIN   AHI 1.1 /HR     Mask comfort and fit appropriate: Yes   Patient denies insufficient or excessive airflow: Yes        Prior Sleep Study:      PRIOR SLEEP STUDY DATE:     22  RESULTS: 1. AHI: 9.9                   2. DESATURATION MARTIN: 82%     Stafford Sleepiness Scale:     0 = would never doze  1 = slight chance of dozing  2 = moderate chance of dozing  3 = high chance of dozing    Situation     Chance of Dozing       1. Sitting and reading   1  2. Watching TV    0    3. Sitting, inactive in a public place       (e.g. a theatre or a meeting)  0    4. As a passenger in a car for an hour       without a break    1  5. Lying down to rest in the afternoon      when circumstances permit  3  6. Sitting and talking to someone  0    7. Sitting quietly after lunch without      Alcohol     0    8. In a car, while stopped for a few      Minutes in traffic    0            TOTAL: 5       Past Medical History      Past Medical History:   Diagnosis Date   • CAD (coronary artery disease)    • Diabetes mellitus (CMS/[facility])    • Hyperlipidemia    • Hypertension    • Obstructive sleep apnea on CPAP         ALLERGIES:  No Known Allergies  Current Outpatient Medications   Medication Sig   • metFORMIN (GLUCOPHAGE) 500 MG tablet Take 500 mg by mouth in the morning and 500 mg before bedtime.   • amLODIPine (NORVASC) 5 MG tablet Take 5 mg by mouth daily.   • aspirin 81 MG EC tablet Take 81 mg by mouth daily.   • metoPROLOL succinate (TOPROL-XL) 100 MG 24 hr tablet Take 100 mg by mouth daily.   •  losartan-hydrochlorothiazide (HYZAAR) 100-25 MG per tablet Take 1 tablet by mouth daily.   • potassium chloride (KLOR-CON) 10 MEQ ER tablet Take 10 mEq by mouth daily.   • atorvastatin (LIPITOR) 20 MG tablet Take 20 mg by mouth daily.     No current facility-administered medications for this visit.           Review Of Systems     Review of Systems   Constitutional: Negative.   HENT: Negative.    Eyes: Negative.    Cardiovascular: Negative.    Respiratory: Negative.    Endocrine: Negative.    Skin: Negative.    Musculoskeletal: Negative.    Gastrointestinal: Negative.    Genitourinary: Negative.    Neurological: Negative.    Psychiatric/Behavioral: Negative.    Allergic/Immunologic: Negative.    All other systems reviewed and are negative.        Physical Exam     Vitals:    01/12/23 1349   BP: 118/60   Pulse: 81   Temp: 97.9 °F (36.6 °C)   SpO2: 98%   Weight: 62.6 kg (138 lb)   Height: 5' 2\" (1.575 m)      Body mass index is 25.24 kg/m².       Nasal Passages: [x]Clear   [] Congested  Palate: Rosas [] I  [] II   [] III   [x] IV              Eyrtherna/edema [x]none []+1  []+2  []+3  []+4    Gen: No acute distress. Pleasant and interactive.  Head:  Normocephalic and atraumatic.  Eyes: Conjunctiva and sclera normal.   Heart:  Normal rate and regular rhythm, no murmur.  Lungs:  Normal respiratory rate and effort, breath sounds equal.  Extremities:  No edema in lower extremities.   Skin: Warm and dry, no rash.  Musculoskeletal:  No joint swelling or tenderness  Psych:  Affect was appropriate to situation and mood was normal.  Neuro:  Alert and oriented,  gait normal.    Assessment/Plan:     Problem List Items Addressed This Visit    None  Visit Diagnoses     THOMAS (obstructive sleep apnea)    -  Primary    Relevant Orders    SERVICE TO HOME CARE RESPIRATORY THERAPY        Patient is benefiting from CPAP/BiPAP therapy: Yes   Patient adherent to therapy: Yes   Daytime sleepiness compensated: Yes   Blood pressure  controlled: Yes   Weight management progressing or optimal: Yes     RECOMMENDATIONS:  Continue current CPAP/BiPAP: Yes   Replace current mask and supplies.   Mask change : No              New Rx: YES CPAP OF 7 CM H20  Weight Reduction Plan: reviewed with patient    Reviewed compliance report with patient and explained that we are meeting compliance with therapy.  Reviewed the importance of using the CPAP for greater than 4 hours every night.  Reviewed when to change supplies and cleaning process.  All questions and concerns were answered and addressed.    Patient Counseling:     We reviewed the importance of continued treatment of sleep apnea to reduce cardiovascular risk.  The patient was encouraged to wear CPAP/BiPAP Therapy every night for at least 4 hours, to bring their machine to any procedure that requires sedation, and was advised not to drive while sleepy.   We reviewed tips for traveling with CPAP, air leak, nasal congestion, skin irritation, and gas/bloating.  We reviewed cleaning and replacement of supplies in detail.   The correct phone number for DME was given to patient.    Follow Up:   Return in about 1 year (around 1/12/2024).    Julita ULLOA   Shirley Sullivan(Resident)

## 2023-01-13 NOTE — PHYSICAL THERAPY INITIAL EVALUATION ADULT - PREDICTED DURATION OF THERAPY (DAYS/WKS), PT EVAL
Spoke with brother Dayana with  ID [id number]. Discussed prognosis and treatment options. He no longer wants blood draws or finger sticks. Does not want any aggressive interventions including a feeding tube. He wants IVF and medications to make his brother comfortable. He prefers inpatient hospice in [place]. Pt  and wife are aware that he has advanced metastatic liver cnacer and was already on hospice.  He came for dizziness and confusion and is now clinically btter.   phone was used for the conversation.  Pt was given info sheet in Czech regarding cpr.  At end of conversation patient wished tothink about dnr/molst.  When I returned 2 hours later he still had not decided.  Him and his wife are aware of poor prognosis if cpr is needed in patients with advanced liver cancer. Pt has end stage liver disease from liver cancer. He is now dependent on iv glucose to maintain his bs levels.  Patient is not a candidate for international travel.  Family is aware that he has a very limited amount of time left.  Brothers will be coming here from there home country tomorrow.   We will discuss dcing iv fluid in the next 24 to 48 hours. pt is independent and d/c from PT services

## 2023-01-19 NOTE — DISCHARGE NOTE NURSING/CASE MANAGEMENT/SOCIAL WORK - CAREGIVER ADDRESS
[de-identified] : 59-year-old female here with a chief complaint of low back pain.  She had L4-L5 [place] L3 laminectomy in July 26, 2021 and states that she was in the hospital 5 days afterward because of pressure and unbearable pain status post surgery.  She states she was finally feeling better walking well and then fell down some steps in a hotel causing exacerbation of low back pain, pressure dysesthesia when being touched left greater than right buttocks and hips and thighs.  Low back pain worse with activity and general.  She was prescribed physical therapy but has not been able to go yet.  She just moved back to [place] from [facility].\par Past medical surgical social family and allergy history was reviewed. [address] [facility] [address]

## 2023-01-23 ENCOUNTER — APPOINTMENT (OUTPATIENT)
Dept: CARDIOLOGY | Facility: CLINIC | Age: 73
End: 2023-01-23
Payer: MEDICARE

## 2023-01-23 ENCOUNTER — NON-APPOINTMENT (OUTPATIENT)
Age: 73
End: 2023-01-23

## 2023-01-23 VITALS
DIASTOLIC BLOOD PRESSURE: 92 MMHG | BODY MASS INDEX: 26.11 KG/M2 | OXYGEN SATURATION: 99 % | WEIGHT: 210 LBS | SYSTOLIC BLOOD PRESSURE: 174 MMHG | HEART RATE: 49 BPM | HEIGHT: 75 IN

## 2023-01-23 VITALS — SYSTOLIC BLOOD PRESSURE: 140 MMHG | DIASTOLIC BLOOD PRESSURE: 80 MMHG

## 2023-01-23 PROCEDURE — 99214 OFFICE O/P EST MOD 30 MIN: CPT | Mod: 25

## 2023-01-23 PROCEDURE — 93000 ELECTROCARDIOGRAM COMPLETE: CPT

## 2023-01-23 NOTE — PHYSICAL EXAM
[Well Developed] : well developed [Well Nourished] : well nourished [No Acute Distress] : no acute distress [Normal Conjunctiva] : normal conjunctiva [Normal Venous Pressure] : normal venous pressure [No Carotid Bruit] : no carotid bruit [Normal] : normal [Normal Rate] : normal [No Murmur] : no murmurs heard [___ +] : bilateral [unfilled]U+ pretibial pitting edema [Clear Lung Fields] : clear lung fields [Good Air Entry] : good air entry [No Respiratory Distress] : no respiratory distress  [Soft] : abdomen soft [Non Tender] : non-tender [No Masses/organomegaly] : no masses/organomegaly [Normal Bowel Sounds] : normal bowel sounds [Normal Gait] : normal gait [No Edema] : no edema [No Cyanosis] : no cyanosis [No Clubbing] : no clubbing [No Varicosities] : no varicosities [No Rash] : no rash [No Skin Lesions] : no skin lesions [Moves all extremities] : moves all extremities [No Focal Deficits] : no focal deficits [Normal Speech] : normal speech [Alert and Oriented] : alert and oriented [Normal memory] : normal memory [Rhythm Regular] : regular [Right Carotid Bruit] : no bruit heard over the right carotid [Left Carotid Bruit] : no bruit heard over the left carotid

## 2023-01-23 NOTE — CARDIOLOGY SUMMARY
[de-identified] : atrial fibrillation \par 81 bpm [de-identified] : 2018 [de-identified] : 9/2021 EF 60%

## 2023-01-23 NOTE — HISTORY OF PRESENT ILLNESS
[FreeTextEntry1] : Celestine is a 72-year-old male here for follow up.\par \par I last saw him in 12/22.\par \par His past medical history is notable for neuropathy/back surgery, a right foot ulcer, melanoma status post resection, and hypertension. 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 He was found to have OM, and finished abx through a picc line.\par \par He has a history of atrial fibrillation, status post cardioversion in the past in 2019. After a resection of a melanoma, in the recovery room he was noted to be in asymptomatic AF.  He was anticoagulated, rate controlled.  Echo unremarkable.  Noted to have brief wct and so had a nuclear stress, which revealed a small area of inferolateral ischemia, which was decided to be managed conservatively.  YANICK DCCV was performed, and he was discharged in SR.\par \par In 2021, he tripped and fell, and broke many vertebrae in his back, and needed emergent neurosurgery. He was eventually discharged to rehab and had a slow recovery, complicated by a wound on his foot. He was finally discharged from rehab in August. At last visit, he reported dyspnea on exertion. He had a pharmacologic stress test in April, prior to this, that did not demonstrate any ischemia Echocardiogram in 2021 demonstrated a overall preserved EF, though with mild distal inferior wall hypokinesis. I repeated his echocardiogram at last visit, and no wall motion abnormality was noted.\par \par On Novemeber 7th 2021, he presented to [facility] ER with a fever of 105 and chills. He was found to have a MRSA bacteremia, and is still on antibiotics. They are not sure where it came from, though a YANICK was negative. He is currently on Daptomycin. \par \par In 2022, he was reporting dyspnea on exertion, with walking up steps.  A repeat echocardiogram showed normal left ventricular systolic function, mild MR, mild AI, mildly dilated left atrium, and mildly elevated pulmonary pressures.  A pharmacologic stress test did have an inferior wall defect, though no obvious ischemia.\par Because of continued symptoms, I set him up for a coronary CTA.  He had a significant calcium burden, with a score of 1065.  Calcified plaque was noted in the LAD, circumflex, and RCA, resulting up to 30 to 40% narrowing.  FFR was negative.\par \par At last visit, we decided to proceed with a cardioversion, to see if maintenance of sinus rhythm would improve his symptoms.  He is now status post DCCV, and is feeling significantly better.  He has no shortness of breath, and feels much better on exertion.  He has no chest pain or palpitations.\par

## 2023-01-23 NOTE — DISCUSSION/SUMMARY
[___ Month(s)] : in [unfilled] month(s) [FreeTextEntry1] : Celestine is now status post DCCV, and has maintained sinus rhythm.  He is feeling significantly better today, without dyspnea on exertion. A recent CTA coronaries demonstrated a notable plaque burden, though no obvious obstructive disease.\par \par I am surprised that his atrial fibrillation was related to his symptoms, though this seems to be the case.  He will remain on metoprolol, Xarelto, and a diuretic.\par In the setting of CAD, he has now been started on atorvastatin 10.\par \par He will continue to be active, and eat right.  If no issues, I will see him again in 3 months.\par  [EKG obtained to assist in diagnosis and management of assessed problem(s)] : EKG obtained to assist in diagnosis and management of assessed problem(s)

## 2023-03-19 ENCOUNTER — RX RENEWAL (OUTPATIENT)
Age: 73
End: 2023-03-19

## 2023-04-24 ENCOUNTER — NON-APPOINTMENT (OUTPATIENT)
Age: 73
End: 2023-04-24

## 2023-04-24 ENCOUNTER — APPOINTMENT (OUTPATIENT)
Dept: CARDIOLOGY | Facility: CLINIC | Age: 73
End: 2023-04-24
Payer: MEDICARE

## 2023-04-24 VITALS
WEIGHT: 215 LBS | HEIGHT: 75 IN | HEART RATE: 51 BPM | DIASTOLIC BLOOD PRESSURE: 96 MMHG | SYSTOLIC BLOOD PRESSURE: 183 MMHG | BODY MASS INDEX: 26.73 KG/M2 | OXYGEN SATURATION: 99 %

## 2023-04-24 VITALS — DIASTOLIC BLOOD PRESSURE: 85 MMHG | SYSTOLIC BLOOD PRESSURE: 153 MMHG

## 2023-04-24 DIAGNOSIS — R06.00 DYSPNEA, UNSPECIFIED: ICD-10-CM

## 2023-04-24 PROCEDURE — 99214 OFFICE O/P EST MOD 30 MIN: CPT | Mod: 25

## 2023-04-24 PROCEDURE — 93000 ELECTROCARDIOGRAM COMPLETE: CPT

## 2023-04-24 RX ORDER — POLYETHYLENE GLYCOL-3350 AND ELECTROLYTES WITH FLAVOR PACK 240; 5.84; 2.98; 6.72; 22.72 G/278.26G; G/278.26G; G/278.26G; G/278.26G; G/278.26G
240 POWDER, FOR SOLUTION ORAL
Qty: 4000 | Refills: 0 | Status: ACTIVE | COMMUNITY
Start: 2023-03-31

## 2023-04-24 NOTE — HISTORY OF PRESENT ILLNESS
[FreeTextEntry1] : Celestine is a 72-year-old male here for follow up.\par \par I last saw him in 1/23.\par \par His past medical history is notable for neuropathy/back surgery, a right foot ulcer, melanoma status post resection, and hypertension. 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 He was found to have OM, and finished abx through a picc line.\par \par He has a history of atrial fibrillation, status post cardioversion in the past in 2019. After a resection of a melanoma, in the recovery room he was noted to be in asymptomatic AF.  He was anticoagulated, rate controlled.  Echo unremarkable.  Noted to have brief wct and so had a nuclear stress, which revealed a small area of inferolateral ischemia, which was decided to be managed conservatively.  YANICK DCCV was performed, and he was discharged in SR.\par \par In 2021, he tripped and fell, and broke many vertebrae in his back, and needed emergent neurosurgery. He was eventually discharged to rehab and had a slow recovery, complicated by a wound on his foot. He was finally discharged from rehab in August. At last visit, he reported dyspnea on exertion. He had a pharmacologic stress test in April, prior to this, that did not demonstrate any ischemia Echocardiogram in 2021 demonstrated a overall preserved EF, though with mild distal inferior wall hypokinesis. I repeated his echocardiogram at last visit, and no wall motion abnormality was noted.\par \par On Novemeber 7th 2021, he presented to [facility] ER with a fever of 105 and chills. He was found to have a MRSA bacteremia, and is still on antibiotics. They are not sure where it came from, though a YANICK was negative. He is currently on Daptomycin. \par \par In 2022, he was reporting dyspnea on exertion, with walking up steps.  A repeat echocardiogram showed normal left ventricular systolic function, mild MR, mild AI, mildly dilated left atrium, and mildly elevated pulmonary pressures.  A pharmacologic stress test did have an inferior wall defect, though no obvious ischemia.\par Because of continued symptoms, I set him up for a coronary CTA.  He had a significant calcium burden, with a score of 1065.  Calcified plaque was noted in the LAD, circumflex, and RCA, resulting up to 30 to 40% narrowing.  FFR was negative.\par \par Earlier this year, we decided to proceed with a cardioversion, to see if maintenance of sinus rhythm would improve his symptoms.  He is now status post DCCV, and is feeling significantly better.  He has no shortness of breath, and feels much better on exertion.  He has no chest pain or palpitations.\par He was noted to have guiaic positive stool, and there is plan for a colonoscopy in early June 2023.\par His BP was checked yesterday and it was in the 120 range.\par

## 2023-04-24 NOTE — DISCUSSION/SUMMARY
[___ Month(s)] : in [unfilled] month(s) [FreeTextEntry1] : Celestine is now status post DCCV, and has maintained sinus rhythm.  He is feeling significantly better today, without dyspnea on exertion. A recent CTA coronaries demonstrated a notable plaque burden, though no obvious obstructive disease.\par \par I am surprised that his atrial fibrillation was related to his symptoms, though this seems to be the case.  He will remain on metoprolol, Xarelto, and a diuretic.\par His BP is elevated today, though was in the 120 range yesterday. He is going to keep a closer eye on this.\par In the setting of CAD, he has now been started on atorvastatin 10. I have requested a copy of his most recent BW.\par \par He will continue to be active, and eat right.  If no issues, I will see him again in 3 months.\par  [EKG obtained to assist in diagnosis and management of assessed problem(s)] : EKG obtained to assist in diagnosis and management of assessed problem(s)

## 2023-04-24 NOTE — PHYSICAL EXAM
[Well Developed] : well developed [Well Nourished] : well nourished [No Acute Distress] : no acute distress [Normal Conjunctiva] : normal conjunctiva [Normal Venous Pressure] : normal venous pressure [No Carotid Bruit] : no carotid bruit [Normal] : normal [Normal Rate] : normal [Rhythm Regular] : regular [No Murmur] : no murmurs heard [___ +] : bilateral [unfilled]U+ pretibial pitting edema [Clear Lung Fields] : clear lung fields [Good Air Entry] : good air entry [No Respiratory Distress] : no respiratory distress  [Soft] : abdomen soft [Non Tender] : non-tender [No Masses/organomegaly] : no masses/organomegaly [Normal Bowel Sounds] : normal bowel sounds [Normal Gait] : normal gait [No Edema] : no edema [No Cyanosis] : no cyanosis [No Clubbing] : no clubbing [No Varicosities] : no varicosities [No Rash] : no rash [No Skin Lesions] : no skin lesions [Moves all extremities] : moves all extremities [No Focal Deficits] : no focal deficits [Normal Speech] : normal speech [Alert and Oriented] : alert and oriented [Normal memory] : normal memory [Right Carotid Bruit] : no bruit heard over the right carotid [Left Carotid Bruit] : no bruit heard over the left carotid

## 2023-04-24 NOTE — CARDIOLOGY SUMMARY
[de-identified] : atrial fibrillation \par 81 bpm [de-identified] : 2018 [de-identified] : 9/2021 EF 60%

## 2023-05-03 ENCOUNTER — HOSPITAL ENCOUNTER (OUTPATIENT)
Dept: GENERAL RADIOLOGY | Age: 73
Discharge: HOME | End: 2023-05-03
Payer: COMMERCIAL

## 2023-05-03 ENCOUNTER — HOSPITAL ENCOUNTER (OUTPATIENT)
Age: 73
Discharge: HOME | End: 2023-05-03
Payer: COMMERCIAL

## 2023-05-03 DIAGNOSIS — Z02.1 DRUG SCREENING, PRE-EMPLOYMENT: ICD-10-CM

## 2023-05-03 PROCEDURE — 71046 X-RAY EXAM CHEST 2 VIEWS: CPT

## 2023-05-15 NOTE — H&P PST ADULT - DOCUMENT STATUS
Adjacent Tissue Transfer Text: The defect edges were debeveled with a #15 scalpel blade. Given the location of the defect and the proximity to free margins an adjacent tissue transfer was deemed most appropriate. Using a sterile surgical marker, an appropriate flap was drawn incorporating the defect and placing the expected incisions within the relaxed skin tension lines where possible. The area thus outlined was incised deep to adipose tissue with a #15 scalpel blade. The skin margins were undermined to an appropriate distance in all directions utilizing iris scissors and carried over to close the primary defect. Authored by Resident/PA/NP

## 2023-05-23 NOTE — PROGRESS NOTE ADULT - SUBJECTIVE AND OBJECTIVE BOX
s/p general anesthesia  no complications noted at this time Failure to thrive in adult Afib Failure to thrive in adult

## 2023-05-30 ENCOUNTER — NON-APPOINTMENT (OUTPATIENT)
Age: 73
End: 2023-05-30

## 2023-06-17 ENCOUNTER — RX RENEWAL (OUTPATIENT)
Age: 73
End: 2023-06-17

## 2023-06-26 ENCOUNTER — RX RENEWAL (OUTPATIENT)
Age: 73
End: 2023-06-26

## 2023-08-04 ENCOUNTER — APPOINTMENT (OUTPATIENT)
Dept: CARDIOLOGY | Facility: CLINIC | Age: 73
End: 2023-08-04
Payer: MEDICARE

## 2023-08-04 VITALS
OXYGEN SATURATION: 97 % | DIASTOLIC BLOOD PRESSURE: 73 MMHG | HEIGHT: 75 IN | HEART RATE: 57 BPM | SYSTOLIC BLOOD PRESSURE: 138 MMHG | BODY MASS INDEX: 27.85 KG/M2 | WEIGHT: 224 LBS

## 2023-08-04 DIAGNOSIS — I48.0 PAROXYSMAL ATRIAL FIBRILLATION: ICD-10-CM

## 2023-08-04 DIAGNOSIS — I25.10 ATHEROSCLEROTIC HEART DISEASE OF NATIVE CORONARY ARTERY W/OUT ANGINA PECTORIS: ICD-10-CM

## 2023-08-04 PROCEDURE — 99214 OFFICE O/P EST MOD 30 MIN: CPT | Mod: 25

## 2023-08-04 PROCEDURE — 93000 ELECTROCARDIOGRAM COMPLETE: CPT

## 2023-08-04 NOTE — HISTORY OF PRESENT ILLNESS
[FreeTextEntry1] : Celestine is a 72-year-old male here for follow up.  I last saw him in 4/23.  His past medical history is notable for neuropathy/back surgery, a right foot ulcer, melanoma status post resection, and hypertension. 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 He was found to have OM, and finished abx through a picc line.  He has a history of atrial fibrillation, status post cardioversion in the past in 2019. After a resection of a melanoma, in the recovery room he was noted to be in asymptomatic AF.  He was anticoagulated, rate controlled.  Echo unremarkable.  Noted to have brief wct and so had a nuclear stress, which revealed a small area of inferolateral ischemia, which was decided to be managed conservatively.  YANICK DCCV was performed, and he was discharged in SR.  In 2021, he tripped and fell, and broke many vertebrae in his back, and needed emergent neurosurgery. He was eventually discharged to rehab and had a slow recovery, complicated by a wound on his foot. He was finally discharged from rehab in August. At last visit, he reported dyspnea on exertion. He had a pharmacologic stress test in April, prior to this, that did not demonstrate any ischemia Echocardiogram in 2021 demonstrated a overall preserved EF, though with mild distal inferior wall hypokinesis. I repeated his echocardiogram at last visit, and no wall motion abnormality was noted.  On Novemeber 7th 2021, he presented to [facility] ER with a fever of 105 and chills. He was found to have a MRSA bacteremia, and is still on antibiotics. They are not sure where it came from, though a YANICK was negative. He is currently on Daptomycin.   In 2022, he was reporting dyspnea on exertion, with walking up steps.  A repeat echocardiogram showed normal left ventricular systolic function, mild MR, mild AI, mildly dilated left atrium, and mildly elevated pulmonary pressures.  A pharmacologic stress test did have an inferior wall defect, though no obvious ischemia. Because of continued symptoms, I set him up for a coronary CTA.  He had a significant calcium burden, with a score of 1065.  Calcified plaque was noted in the LAD, circumflex, and RCA, resulting up to 30 to 40% narrowing.  FFR was negative.  Earlier this year, we decided to proceed with a cardioversion, to see if maintenance of sinus rhythm would improve his symptoms.  He is now status post DCCV, and is feeling significantly better.  He has no shortness of breath, and feels much better on exertion.  He has no chest pain or palpitations. He was noted to have guiaic positive stool, though had a normal colonoscopy. His BP has been well controlled.

## 2023-08-04 NOTE — DISCUSSION/SUMMARY
[___ Month(s)] : in [unfilled] month(s) [FreeTextEntry1] : Celestine is now status post DCCV, and has maintained sinus rhythm.  He is feeling significantly better today, without dyspnea on exertion. A recent CTA coronaries demonstrated a notable plaque burden, though no obvious obstructive disease.  I am surprised that his atrial fibrillation was related to his symptoms, though this seems to be the case.  He will remain on metoprolol, Xarelto, and a diuretic. His BP is better today. In the setting of CAD, he will remain on a statin. I am going to repeat a stress test in 2024.  He will continue to be active, and eat right.  If no issues, I will see him again in 6 months.  [EKG obtained to assist in diagnosis and management of assessed problem(s)] : EKG obtained to assist in diagnosis and management of assessed problem(s)

## 2023-08-04 NOTE — CARDIOLOGY SUMMARY
[de-identified] : atrial fibrillation \par  81 bpm [de-identified] : 2018 [de-identified] : 9/2021 EF 60%

## 2023-08-04 NOTE — PHYSICAL EXAM
[Well Developed] : well developed [Well Nourished] : well nourished [No Acute Distress] : no acute distress [Normal Conjunctiva] : normal conjunctiva [Normal Venous Pressure] : normal venous pressure [No Carotid Bruit] : no carotid bruit [Normal] : normal [Normal Rate] : normal [Rhythm Regular] : regular [No Murmur] : no murmurs heard [___ +] : bilateral [unfilled]U+ pretibial pitting edema [Right Carotid Bruit] : no bruit heard over the right carotid [Left Carotid Bruit] : no bruit heard over the left carotid [Clear Lung Fields] : clear lung fields [Good Air Entry] : good air entry [No Respiratory Distress] : no respiratory distress  [Soft] : abdomen soft [Non Tender] : non-tender [No Masses/organomegaly] : no masses/organomegaly [Normal Bowel Sounds] : normal bowel sounds [Normal Gait] : normal gait [No Edema] : no edema [No Cyanosis] : no cyanosis [No Clubbing] : no clubbing [No Varicosities] : no varicosities [No Rash] : no rash [No Skin Lesions] : no skin lesions [Moves all extremities] : moves all extremities [No Focal Deficits] : no focal deficits [Normal Speech] : normal speech [Alert and Oriented] : alert and oriented [Normal memory] : normal memory

## 2024-02-09 ENCOUNTER — APPOINTMENT (OUTPATIENT)
Dept: CARDIOLOGY | Facility: CLINIC | Age: 74
End: 2024-02-09

## 2024-05-01 NOTE — ASU PREOP CHECKLIST - HEIGHT IN CM
From: Amalia Lofton  To: Dr. Barbi Burns  Sent: 5/1/2024 3:52 PM EDT  Subject: Speech Therapy     Good late afternoon,    Hope you are doing well. I wanted to see if you could please write me a referral for an adult speech therapist. I’ve found that [facility] had one. 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 I’m embarrassed to say that I still can’t pronounce my Rs and Ws well and it’s held me back a lot in life. In tears I’m asking for help so I can find more charlee in myself and it’s extremely hard to ask. But it’s held me back a lot in my life. I recently had to meet with a group of people and I was so embarrassed. I appreciate you always thank you!    Amalia Lofton   
190.5

## 2024-05-08 ENCOUNTER — APPOINTMENT (OUTPATIENT)
Dept: CARDIOLOGY | Facility: CLINIC | Age: 74
End: 2024-05-08
Payer: MEDICARE

## 2024-05-08 ENCOUNTER — NON-APPOINTMENT (OUTPATIENT)
Age: 74
End: 2024-05-08

## 2024-05-08 VITALS — SYSTOLIC BLOOD PRESSURE: 158 MMHG | DIASTOLIC BLOOD PRESSURE: 80 MMHG

## 2024-05-08 VITALS
OXYGEN SATURATION: 100 % | DIASTOLIC BLOOD PRESSURE: 96 MMHG | HEART RATE: 59 BPM | BODY MASS INDEX: 29.84 KG/M2 | HEIGHT: 75 IN | WEIGHT: 240 LBS | SYSTOLIC BLOOD PRESSURE: 184 MMHG

## 2024-05-08 DIAGNOSIS — I48.91 UNSPECIFIED ATRIAL FIBRILLATION: ICD-10-CM

## 2024-05-08 DIAGNOSIS — M79.89 OTHER SPECIFIED SOFT TISSUE DISORDERS: ICD-10-CM

## 2024-05-08 DIAGNOSIS — I10 ESSENTIAL (PRIMARY) HYPERTENSION: ICD-10-CM

## 2024-05-08 PROCEDURE — 93000 ELECTROCARDIOGRAM COMPLETE: CPT

## 2024-05-08 PROCEDURE — 99214 OFFICE O/P EST MOD 30 MIN: CPT | Mod: 25

## 2024-05-08 RX ORDER — TAMSULOSIN HYDROCHLORIDE 0.4 MG/1
0.4 CAPSULE ORAL
Qty: 90 | Refills: 0 | Status: DISCONTINUED | COMMUNITY
Start: 2020-06-05 | End: 2024-05-08

## 2024-05-08 RX ORDER — FUROSEMIDE 20 MG/1
20 TABLET ORAL DAILY
Qty: 90 | Refills: 3 | Status: ACTIVE | COMMUNITY
Start: 2024-05-08 | End: 1900-01-01

## 2024-05-08 RX ORDER — RIVAROXABAN 20 MG/1
20 TABLET, FILM COATED ORAL
Qty: 90 | Refills: 3 | Status: ACTIVE | COMMUNITY
Start: 2021-10-17 | End: 1900-01-01

## 2024-05-08 RX ORDER — ATORVASTATIN CALCIUM 10 MG/1
10 TABLET, FILM COATED ORAL
Qty: 90 | Refills: 3 | Status: ACTIVE | COMMUNITY
Start: 2022-12-21 | End: 1900-01-01

## 2024-05-08 RX ORDER — ERGOCALCIFEROL 1.25 MG/1
1.25 MG CAPSULE, LIQUID FILLED ORAL
Refills: 0 | Status: DISCONTINUED | COMMUNITY
Start: 2018-04-16 | End: 2024-05-08

## 2024-05-08 RX ORDER — FUROSEMIDE 20 MG/1
20 TABLET ORAL DAILY
Qty: 90 | Refills: 2 | Status: DISCONTINUED | COMMUNITY
Start: 2021-09-10 | End: 2024-05-08

## 2024-05-08 RX ORDER — METOPROLOL SUCCINATE 100 MG/1
100 TABLET, EXTENDED RELEASE ORAL DAILY
Qty: 90 | Refills: 3 | Status: ACTIVE | COMMUNITY
Start: 2019-03-19 | End: 1900-01-01

## 2024-05-09 ENCOUNTER — APPOINTMENT (OUTPATIENT)
Dept: CARDIOLOGY | Facility: CLINIC | Age: 74
End: 2024-05-09
Payer: MEDICARE

## 2024-05-09 ENCOUNTER — MED ADMIN CHARGE (OUTPATIENT)
Age: 74
End: 2024-05-09

## 2024-05-09 PROCEDURE — 93306 TTE W/DOPPLER COMPLETE: CPT

## 2024-05-09 PROCEDURE — 93970 EXTREMITY STUDY: CPT

## 2024-05-09 RX ORDER — PERFLUTREN 6.52 MG/ML
6.52 INJECTION, SUSPENSION INTRAVENOUS
Qty: 2 | Refills: 0 | Status: COMPLETED | OUTPATIENT
Start: 2024-05-09

## 2024-05-09 RX ADMIN — PERFLUTREN MG/ML: 6.52 INJECTION, SUSPENSION INTRAVENOUS at 00:00

## 2024-05-09 NOTE — DISCUSSION/SUMMARY
[___ Month(s)] : in [unfilled] month(s) [EKG obtained to assist in diagnosis and management of assessed problem(s)] : EKG obtained to assist in diagnosis and management of assessed problem(s) [FreeTextEntry1] : Celestine arrives for routine follow-up.  Prior visit history as noted, he is status post DCCV in 2023, and has maintained sinus rhythm. He arrives in no acute distress.  He appears euvolemic on exam, though he has mild-mod Le swelling, L>RT. His blood pressure is elevated despite resting a while in the office.  Because he has been off oral A/C with sudden Leg swelling and tenderness, I have advised he undergo a LE venous duplex to r/o DVTs. In the mean time, he will resume xarelto and if duplex +, will adjust dosage.  He is in sinus rhythm today.  He is without symptoms of recurrent Afib. He will continue metoprolol 100mg for rate control.  He will resume xarelto as stated. His blood pressure is elevated, may be due to dietary indiscretions.  For now, he will monitor at home. He will undergo an echocardiogram for surveillance to assess for changes to his cardiac structure and function. We will add back lasix 20mg to his regimen.   A recent CTA coronaries demonstrated a notable plaque burden, though no obvious obstructive disease. No need for surveillance ischemic workup at this time, can perform stress testing later in the year. He will continue atorvastatin 10mg for goal LDL <70.  I will call him with results of the above testing once completed. He will continue to be active, and eat right.  If no issues, I will see him again in 6 months.

## 2024-05-09 NOTE — CARDIOLOGY SUMMARY
[de-identified] : sinus rhythm   [de-identified] : 2018 [de-identified] : 6/2022 LVEF 57% NML LV/RV mild MR RVSP 42 [de-identified] : 9/2022 CTA Ca score 1065 calcified Plaque of LAD/LCX/RCA 30-40% -FFR [de-identified] : 2020 DCCV YANICK/DCCV 1/6/2023

## 2024-05-09 NOTE — HISTORY OF PRESENT ILLNESS
[FreeTextEntry1] : Celestine is a 72-year-old male here for follow up.  I last saw him in 8/23. His past medical history is notable for neuropathy/back surgery, a right foot ulcer, melanoma status post resection, and hypertension. 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 He was found to have OM, and finished abx through a picc line.  He has a history of atrial fibrillation, status post cardioversion in the past in 2019. After a resection of a melanoma, in the recovery room he was noted to be in asymptomatic AF.  He was anticoagulated, rate controlled.  Echo unremarkable.  Noted to have brief wct and so had a nuclear stress, which revealed a small area of inferolateral ischemia, which was decided to be managed conservatively.  YANICK DCCV was performed, and he was discharged in SR.  In 2021, he tripped and fell, and broke many vertebrae in his back, and needed emergent neurosurgery. He was eventually discharged to rehab and had a slow recovery, complicated by a wound on his foot. He was finally discharged from rehab in August. At last visit, he reported dyspnea on exertion. He had a pharmacologic stress test in April, prior to this, that did not demonstrate any ischemia Echocardiogram in 2021 demonstrated a overall preserved EF, though with mild distal inferior wall hypokinesis. I repeated his echocardiogram at last visit, and no wall motion abnormality was noted.  On Novemeber 7th 2021, he presented to [facility] ER with a fever of 105 and chills. He was found to have a MRSA bacteremia, and is still on antibiotics. They are not sure where it came from, though a YANICK was negative. He is currently on Daptomycin.   In 2022, he was reporting dyspnea on exertion, with walking up steps.  A repeat echocardiogram showed normal left ventricular systolic function, mild MR, mild AI, mildly dilated left atrium, and mildly elevated pulmonary pressures.  A pharmacologic stress test did have an inferior wall defect, though no obvious ischemia. Because of continued symptoms, I set him up for a coronary CTA.  He had a significant calcium burden, with a score of 1065.  Calcified plaque was noted in the LAD, circumflex, and RCA, resulting up to 30 to 40% narrowing.  FFR was negative.  Earlier this year, we decided to proceed with a cardioversion, to see if maintenance of sinus rhythm would improve his symptoms.  He is now status post DCCV on 1/6/23, and is feeling significantly better.   He was noted to have guiaic positive stool, though had a normal colonoscopy. He has stopped xarelto on his own  He has no shortness of breath, and feels much better on exertion.  He has no chest pain or palpitations.  He walks up to 1 mile and 1/2 three times a week without concerning symptoms. More recently however his Legs have been swelling, Left lower leg more than the RT and has been a little tender.

## 2024-05-09 NOTE — ADDENDUM
[FreeTextEntry1] : paf, into sr with dccv unclear why stopped his ac for repeat echo/le doppler given leg swelling

## 2024-05-09 NOTE — REVIEW OF SYSTEMS
[Negative] : Heme/Lymph [Lower Ext Edema] : lower extremity edema [SOB] : no shortness of breath [Dyspnea on exertion] : not dyspnea during exertion [Chest Discomfort] : no chest discomfort [Leg Claudication] : no intermittent leg claudication [Palpitations] : no palpitations [Orthopnea] : no orthopnea [Syncope] : no syncope

## 2024-08-10 ENCOUNTER — APPOINTMENT (OUTPATIENT)
Dept: ORTHOPEDIC SURGERY | Facility: CLINIC | Age: 74
End: 2024-08-10

## 2024-08-10 PROBLEM — R60.9 SWELLING: Status: ACTIVE | Noted: 2024-08-10

## 2024-08-10 PROCEDURE — 73562 X-RAY EXAM OF KNEE 3: CPT | Mod: LT

## 2024-08-10 PROCEDURE — 99203 OFFICE O/P NEW LOW 30 MIN: CPT

## 2024-08-10 NOTE — PHYSICAL EXAM
[de-identified] : The patient is conversive and in no apparent distress. The patient is alert and oriented to person, place, and time. Affect and mood appear normal. The head is normocephalic and atraumatic. Skin shows normal turgor with no evidence of eczema or psoriasis. No respiratory distress noted. Sensation grossly intact. MUSCULOSKELETAL:   SEE BELOW  Left knee exam demonstrates skin is clean, dry intact.  No surgical scars.  No signs of acute trauma.  No erythema or ecchymosis.  Minimal effusion.  Normal temperature.  No specific tenderness of the anterior knee.  Minimal tenderness of the lateral knee.  No posterior fullness or tenderness.  There is a large area of soft tissue firmness of the left lower leg of the proximal medial aspect.  No specific tenderness is appreciated.  The site is not fluctuant.  It is not red.  No warmth.  Mild varicose veins noted distally.  Minimal venous stasis noted distally.  No open wounds.  No signs of active infection. Range of motion of the left knee demonstrates a -15 degree flexion contracture and has flexion of approximately 105 degrees.  No significant discomfort or pain is appreciated with range of motion testing. [de-identified] : Three-view left knee x-rays were reviewed.  Severe end-stage osteoarthritis diffusely of the knee.  There is severe bone-on-bone erosion of the left lateral compartment.

## 2024-08-10 NOTE — HISTORY OF PRESENT ILLNESS
[de-identified] : Patient presents today for evaluation of soft tissue swelling of the left lower leg.  He has had it now for a month.  He denies of any specific trauma.  There is some discomfort at times.  He denies of any redness.  No direct trauma.  He was recently seen at his cardiologist office that did an ultrasound and was told that he had a Baker's cyst.  This current swelling is proximal medial left lower leg.  He denies of any posterior knee pain.  Denies of any posterior knee swelling.  He is known to have a severe end-stage osteoarthritic left knee.  He has had previous knee aspirations.  No past Pate's cyst diagnosis or aspiration.  He does limp at times.  He is not using a cane or a walker.  Review of Systems- Constitutional: No fever or chills.  Cardiovascular: No orthopnea or chest pain Pulmonary: No shortness of breath.  GI: No nausea or vomiting or abdominal pain. Musculoskeletal: see HPI  Psychiatric: No anxiety and depression.

## 2024-08-10 NOTE — DISCUSSION/SUMMARY
[de-identified] : 30 minutes was spent ordering tests, reviewing past office notes, examining the patient, discussing the clinical presentation and findings, communicating and explaining the diagnosis, ordering medication, providing orthopedic education and documenting the visit in the electronic medical record.  Patient presents today for evaluation of soft tissue swelling of the left proximal medial lower leg.  He is had it for about a month.  There is some discomfort associated however, this may be coming from the end-stage osteoarthritis of the knee.  He states he was told that he has a Baker's cyst in that location.  The location of the soft tissue swelling does not correlate with the traditional popliteal fossa cyst.  There is concern that this could be a soft tissue mass.  The patient is recommended an MRI of the left lower leg to focus on this area.  He will contact the office to let the office know the MRI is done.  The results will be discussed with the patient via telephone to decide next treatment plan.  All questions were answered to the patient's satisfaction.

## 2024-08-15 ENCOUNTER — APPOINTMENT (OUTPATIENT)
Dept: MRI IMAGING | Facility: CLINIC | Age: 74
End: 2024-08-15

## 2024-08-19 ENCOUNTER — APPOINTMENT (OUTPATIENT)
Dept: ORTHOPEDIC SURGERY | Facility: CLINIC | Age: 74
End: 2024-08-19

## 2024-08-21 PROBLEM — M17.12 PRIMARY LOCALIZED OSTEOARTHRITIS OF LEFT KNEE: Status: ACTIVE | Noted: 2024-08-10

## 2024-09-09 ENCOUNTER — APPOINTMENT (OUTPATIENT)
Dept: ORTHOPEDIC SURGERY | Facility: CLINIC | Age: 74
End: 2024-09-09
Payer: MEDICARE

## 2024-09-09 VITALS — BODY MASS INDEX: 29.84 KG/M2 | WEIGHT: 240 LBS | HEIGHT: 75 IN

## 2024-09-09 DIAGNOSIS — M79.89 OTHER SPECIFIED SOFT TISSUE DISORDERS: ICD-10-CM

## 2024-09-09 DIAGNOSIS — M17.12 UNILATERAL PRIMARY OSTEOARTHRITIS, LEFT KNEE: ICD-10-CM

## 2024-09-09 PROCEDURE — 73564 X-RAY EXAM KNEE 4 OR MORE: CPT | Mod: LT

## 2024-09-09 PROCEDURE — 99204 OFFICE O/P NEW MOD 45 MIN: CPT

## 2024-09-09 NOTE — PHYSICAL EXAM
[de-identified] : 4 views of the left knee obtained the office today show no acute fracture or dislocation.  There is severe lateral joint space narrowing bone osteoarthritis tricompartmental degenerative changes consistent with Kellgren-Tim grade 4 changes no bony lesions noted [de-identified] : The patient is conversive and in no apparent distress. The patient is alert and oriented to person, place, and time. Affect and mood appear normal. The head is normocephalic and atraumatic. Skin shows normal turgor with no evidence of eczema or psoriasis. No respiratory distress noted. Sensation grossly intact. MUSCULOSKELETAL: SEE BELOW  Left knee exam demonstrates skin is clean, dry intact. No surgical scars. No signs of acute trauma. No erythema or ecchymosis. Minimal effusion. Normal temperature. No specific tenderness of the anterior knee. Minimal tenderness of the lateral knee. No posterior fullness or tenderness. There is a large area of soft tissue firmness of the left lower leg of the proximal medial aspect. No specific tenderness is appreciated. The site is not fluctuant. It is not red. No warmth. Mild varicose veins noted distally. Minimal venous stasis noted distally. No open wounds. No signs of active infection. Range of motion of the left knee demonstrates a -15 degree flexion contracture and has flexion of approximately 105 degrees. No significant discomfort or pain is appreciated with range of motion testing.

## 2024-09-09 NOTE — HISTORY OF PRESENT ILLNESS
[de-identified] : Patient is a 73-year-old male here today for follow-up of left leg swelling.  He was seen by another provider previously was indicated for an MRI.  He states that his insurance company has denied his MRI.  He states he has been in recent contact with them and is still not had any clearance for the MRI.  He states there is still severe soft tissue swelling over the medial aspect of his leg.  He was told by his cardiologist that it could be a Baker's cyst however the location of the swelling does not match with a Baker's cyst.  Denies any increasing pain.  Does have known end-stage osteoarthritis of his left knee.  Is here today to discuss further treatment options

## 2024-09-09 NOTE — DISCUSSION/SUMMARY
[Medication Risks Reviewed] : Medication risks reviewed [de-identified] : Patient is a 73-year-old male here today for evaluation of left leg swelling.  He does have a firm area of severe swelling over the medial aspect of his proximal tibia.  This does not correlate with a normal Baker's cyst.  For that reason I recommended an MRI of his left tib-fib with and without contrast in order to rule out any soft tissue mass.  We discussed low impact activity and exercise.  Take Tylenol as needed for the pain.  I will see him back after the MRI for repeat evaluation management.  All questions were asked and answered

## 2024-09-20 ENCOUNTER — APPOINTMENT (OUTPATIENT)
Dept: MRI IMAGING | Facility: CLINIC | Age: 74
End: 2024-09-20
Payer: MEDICARE

## 2024-09-20 PROCEDURE — 73720 MRI LWR EXTREMITY W/O&W/DYE: CPT | Mod: LT

## 2024-09-20 PROCEDURE — A9585: CPT | Mod: JZ

## 2024-09-30 ENCOUNTER — APPOINTMENT (OUTPATIENT)
Dept: ORTHOPEDIC SURGERY | Facility: CLINIC | Age: 74
End: 2024-09-30
Payer: MEDICARE

## 2024-09-30 DIAGNOSIS — M17.12 UNILATERAL PRIMARY OSTEOARTHRITIS, LEFT KNEE: ICD-10-CM

## 2024-09-30 DIAGNOSIS — M71.20 SYNOVIAL CYST OF POPLITEAL SPACE [BAKER], UNSPECIFIED KNEE: ICD-10-CM

## 2024-09-30 PROCEDURE — G2211 COMPLEX E/M VISIT ADD ON: CPT

## 2024-09-30 PROCEDURE — 99213 OFFICE O/P EST LOW 20 MIN: CPT

## 2024-09-30 NOTE — DISCUSSION/SUMMARY
[Medication Risks Reviewed] : Medication risks reviewed [Surgical risks reviewed] : Surgical risks reviewed [PRN] : PRN [de-identified] : Patient is a 73-year-old male here today for follow-up of his left leg swelling.  His MRI was reassuring and shows a popliteal cyst.  I have therefore recommended continued conservative treatment at this time.  We discussed low impact activity exercise.  At this time he is asymptomatic from his severe left knee osteoarthritis.  He will follow-up on an as-needed basis.  All questions were asked and answered

## 2024-09-30 NOTE — HISTORY OF PRESENT ILLNESS
[Pain Location] : pain [] : left knee [Stable] : stable [Constant] : ~He/She~ states the symptoms seem to be constant [de-identified] : 73 year old male presents to the office today for a follow-up appointment for his ongoing left knee pain. Since last visit, patient states there are no changes to pain. Here to review MRI results. Does notice lump on his knee to vary in size depending on the day. No other complaints or new concerns.

## 2024-09-30 NOTE — PHYSICAL EXAM
[de-identified] : The patient is conversive and in no apparent distress. The patient is alert and oriented to person, place, and time. Affect and mood appear normal. The head is normocephalic and atraumatic. Skin shows normal turgor with no evidence of eczema or psoriasis. No respiratory distress noted. Sensation grossly intact. MUSCULOSKELETAL: SEE BELOW  Left knee exam demonstrates skin is clean, dry intact. No surgical scars. No signs of acute trauma. No erythema or ecchymosis. Minimal effusion. Normal temperature. No specific tenderness of the anterior knee. Minimal tenderness of the lateral knee. No posterior fullness or tenderness. There is a large area of soft tissue firmness of the left lower leg of the proximal medial aspect. No specific tenderness is appreciated. The site is not fluctuant. It is not red. No warmth. Mild varicose veins noted distally. Minimal venous stasis noted distally. No open wounds. No signs of active infection. Range of motion of the left knee demonstrates a -15 degree flexion contracture and has flexion of approximately 105 degrees. No significant discomfort or pain is appreciated with range of motion testing. [de-identified] :  MR TIBIA FIBULA WITHOUT AND WITH IV CONTRAST LEFT 09/20/2024 IMPRESSION: 1.  Large popliteus soft tissue ganglion is present extending along the muscle and medial margin of the proximal half of the tibial diaphysis at the site of concern. 2.  Severe osteoarthritis of the left knee.

## 2024-09-30 NOTE — ADDENDUM
[FreeTextEntry1] : This note was written by Abby Shearer, acting as the  for Dr. Mary. This note accurately reflects the work and decisions made by Dr. Mary.

## 2025-04-23 NOTE — ED PROVIDER NOTE - OBJECTIVE STATEMENT
Patient Education   Preventive Care Advice   This is general advice given by our system to help you stay healthy. However, your care team may have specific advice just for you. Please talk to your care team about your preventive care needs.  Nutrition  Eat 5 or more servings of fruits and vegetables each day.  Try wheat bread, brown rice and whole grain pasta (instead of white bread, rice, and pasta).  Get enough calcium and vitamin D. Check the label on foods and aim for 100% of the RDA (recommended daily allowance).  Lifestyle  Exercise at least 150 minutes each week  (30 minutes a day, 5 days a week).  Do muscle strengthening activities 2 days a week. These help control your weight and prevent disease.  No smoking.  Wear sunscreen to prevent skin cancer.  Have a dental exam and cleaning every 6 months.  Yearly exams  See your health care team every year to talk about:  Any changes in your health.  Any medicines your care team has prescribed.  Preventive care, family planning, and ways to prevent chronic diseases.  Shots (vaccines)   HPV shots (up to age 26), if you've never had them before.  Hepatitis B shots (up to age 59), if you've never had them before.  COVID-19 shot: Get this shot when it's due.  Flu shot: Get a flu shot every year.  Tetanus shot: Get a tetanus shot every 10 years.  Pneumococcal, hepatitis A, and RSV shots: Ask your care team if you need these based on your risk.  Shingles shot (for age 50 and up)  General health tests  Diabetes screening:  Starting at age 35, Get screened for diabetes at least every 3 years.  If you are younger than age 35, ask your care team if you should be screened for diabetes.  Cholesterol test: At age 39, start having a cholesterol test every 5 years, or more often if advised.  Bone density scan (DEXA): At age 50, ask your care team if you should have this scan for osteoporosis (brittle bones).  Hepatitis C: Get tested at least once in your life.  STIs (sexually  transmitted infections)  Before age 24: Ask your care team if you should be screened for STIs.  After age 24: Get screened for STIs if you're at risk. You are at risk for STIs (including HIV) if:  You are sexually active with more than one person.  You don't use condoms every time.  You or a partner was diagnosed with a sexually transmitted infection.  If you are at risk for HIV, ask about PrEP medicine to prevent HIV.  Get tested for HIV at least once in your life, whether you are at risk for HIV or not.  Cancer screening tests  Cervical cancer screening: If you have a cervix, begin getting regular cervical cancer screening tests starting at age 21.  Breast cancer scan (mammogram): If you've ever had breasts, begin having regular mammograms starting at age 40. This is a scan to check for breast cancer.  Colon cancer screening: It is important to start screening for colon cancer at age 45.  Have a colonoscopy test every 10 years (or more often if you're at risk) Or, ask your provider about stool tests like a FIT test every year or Cologuard test every 3 years.  To learn more about your testing options, visit:   .  For help making a decision, visit:   https://bit.ly/dm11486.  Prostate cancer screening test: If you have a prostate, ask your care team if a prostate cancer screening test (PSA) at age 55 is right for you.  Lung cancer screening: If you are a current or former smoker ages 50 to 80, ask your care team if ongoing lung cancer screenings are right for you.  For informational purposes only. Not to replace the advice of your health care provider. Copyright   2023 [employer]. All rights reserved. Clinically reviewed by the [facility] Transitions Program. [employer] 208908 - REV 01/24.      67yo M with hx of HTN on atenolol presents with new onset Afib after melanoma removal surgery. Was seen in same day OR for removal of melanoma earlier 3/14. Had melanoma removal in the past, here for recurrence. PST/previous EKGs are unknown and unable to be found. Patient does not recall any EKGs in the past. Pt found to be in Afib after the surgery during monitoring. Pt is asymptomatic and denies any palpitations or chest pain.

## 2025-06-20 ENCOUNTER — RX RENEWAL (OUTPATIENT)
Age: 75
End: 2025-06-20

## 2025-07-16 NOTE — DISCHARGE NOTE NURSING/CASE MANAGEMENT/SOCIAL WORK - NSTOBACCONEVERSMOKERY/N_GEN_A
"Patient presents to the clinic for pre-op.    FOOD SECURITY SCREENING QUESTIONS:    The next two questions are to help us understand your food security.  If you are feeling you need any assistance in this area, we have resources available to support you today.    Hunger Vital Signs:  Within the past 12 months we worried whether our food would run out before we got money to buy more. Never  Within the past 12 months the food we bought just didn't last and we didn't have money to get more. Never    Advance Care Directive on file? yes  Advance Care Directive provided to patient? Declined.      Chief Complaint   Patient presents with    Pre-Op Exam       Initial /66 (BP Location: Right arm, Patient Position: Sitting, Cuff Size: Adult Regular)   Pulse 84   Temp 98.5  F (36.9  C) (Tympanic)   Resp 20   Ht 1.74 m (5' 8.5\")   Wt 67.6 kg (149 lb)   SpO2 97%   BMI 22.33 kg/m   Estimated body mass index is 22.33 kg/m  as calculated from the following:    Height as of this encounter: 1.74 m (5' 8.5\").    Weight as of this encounter: 67.6 kg (149 lb).  Medication Reconciliation: complete        Mary Barney LPN     "
No

## 2025-07-22 NOTE — ED PROVIDER NOTE - NS ED MD TWO NIGHTS YN
Do not eat any food or drink any beverages for 12 hours before having the testing done. You may have water only; NO candy, gum, mints, coffee, soda or juice.  Please take all medications as usual. Do not drink any alcoholic beverages for 24 hours prior to testing.     Yes